# Patient Record
Sex: FEMALE | Race: WHITE | NOT HISPANIC OR LATINO | Employment: STUDENT | URBAN - METROPOLITAN AREA
[De-identification: names, ages, dates, MRNs, and addresses within clinical notes are randomized per-mention and may not be internally consistent; named-entity substitution may affect disease eponyms.]

---

## 2017-01-25 ENCOUNTER — HOSPITAL ENCOUNTER (OUTPATIENT)
Dept: CARDIOLOGY | Facility: CLINIC | Age: 27
Discharge: HOME | End: 2017-01-25
Payer: COMMERCIAL

## 2017-01-25 ENCOUNTER — OFFICE VISIT (OUTPATIENT)
Dept: CARDIOLOGY | Facility: CLINIC | Age: 27
End: 2017-01-25
Payer: COMMERCIAL

## 2017-01-25 ENCOUNTER — DOCUMENTATION ONLY (OUTPATIENT)
Dept: CARDIOLOGY | Facility: CLINIC | Age: 27
End: 2017-01-25

## 2017-01-25 VITALS
WEIGHT: 139.31 LBS | BODY MASS INDEX: 25.64 KG/M2 | DIASTOLIC BLOOD PRESSURE: 73 MMHG | OXYGEN SATURATION: 100 % | HEIGHT: 62 IN | HEART RATE: 82 BPM | SYSTOLIC BLOOD PRESSURE: 107 MMHG

## 2017-01-25 DIAGNOSIS — Q21.10 ASD (ATRIAL SEPTAL DEFECT): ICD-10-CM

## 2017-01-25 DIAGNOSIS — Q21.12 PFO (PATENT FORAMEN OVALE): ICD-10-CM

## 2017-01-25 DIAGNOSIS — Z87.74 STATUS POST DEVICE CLOSURE OF ASD: ICD-10-CM

## 2017-01-25 DIAGNOSIS — Q21.12 PFO (PATENT FORAMEN OVALE): Primary | ICD-10-CM

## 2017-01-25 DIAGNOSIS — Q21.10 ASD (ATRIAL SEPTAL DEFECT): Primary | ICD-10-CM

## 2017-01-25 LAB
DIASTOLIC DYSFUNCTION: NO
ESTIMATED PA SYSTOLIC PRESSURE: 20.79
RETIRED EF AND QEF - SEE NOTES: 60 (ref 55–65)
TRICUSPID VALVE REGURGITATION: NORMAL

## 2017-01-25 PROCEDURE — 93306 TTE W/DOPPLER COMPLETE: CPT | Mod: S$GLB,,, | Performed by: INTERNAL MEDICINE

## 2017-01-25 PROCEDURE — 99213 OFFICE O/P EST LOW 20 MIN: CPT | Mod: S$GLB,,, | Performed by: INTERNAL MEDICINE

## 2017-01-25 PROCEDURE — 99999 PR PBB SHADOW E&M-EST. PATIENT-LVL III: CPT | Mod: PBBFAC,,, | Performed by: INTERNAL MEDICINE

## 2017-01-25 PROCEDURE — 1159F MED LIST DOCD IN RCRD: CPT | Mod: S$GLB,,, | Performed by: INTERNAL MEDICINE

## 2017-01-25 NOTE — PROGRESS NOTES
Subjective:    Patient ID:  Jen Lazar is a 26 y.o. female who presents for follow-up of No chief complaint on file.      HPI    ROS     Objective:    Physical Exam      Assessment:       No diagnosis found.     Plan:

## 2017-01-25 NOTE — PROGRESS NOTES
Subjective:    Patient ID:  Jen Lazar is a 26 y.o. female who presents for follow-up of post PFO closure.  HPI She has had some sinus congestion without fever. She has no chest pain or shortness of breath.    Review of Systems   Constitution: Negative for chills, fever and malaise/fatigue.   HENT: Positive for congestion. Negative for headaches, hoarse voice and sore throat.    Cardiovascular: Negative for chest pain, claudication, cyanosis, dyspnea on exertion, irregular heartbeat, leg swelling, near-syncope, orthopnea, palpitations, paroxysmal nocturnal dyspnea and syncope.   Respiratory: Negative for cough, shortness of breath and wheezing.         Objective:    Physical Exam   Constitutional: She is oriented to person, place, and time. She appears well-developed and well-nourished. No distress.   Neck: No JVD present.   Cardiovascular: Normal rate, regular rhythm, normal heart sounds and intact distal pulses.    No murmur heard.  Pulmonary/Chest: Effort normal and breath sounds normal. No respiratory distress. She has no wheezes. She has no rales.   Musculoskeletal: She exhibits no edema.   Neurological: She is alert and oriented to person, place, and time.   Skin: Skin is warm and dry.         Assessment:       1. PFO (patent foramen ovale)    2. Status post device closure of ASD    3. ASD (atrial septal defect)         Plan:        1.    Annual follow up with echo.

## 2017-02-03 ENCOUNTER — OFFICE VISIT (OUTPATIENT)
Dept: FAMILY MEDICINE | Facility: CLINIC | Age: 27
End: 2017-02-03
Payer: COMMERCIAL

## 2017-02-03 VITALS
SYSTOLIC BLOOD PRESSURE: 100 MMHG | WEIGHT: 138.25 LBS | BODY MASS INDEX: 25.44 KG/M2 | HEIGHT: 62 IN | HEART RATE: 81 BPM | DIASTOLIC BLOOD PRESSURE: 62 MMHG

## 2017-02-03 DIAGNOSIS — F90.9 ATTENTION DEFICIT HYPERACTIVITY DISORDER (ADHD), UNSPECIFIED ADHD TYPE: Primary | ICD-10-CM

## 2017-02-03 DIAGNOSIS — S03.00XD TMJ (DISLOCATION OF TEMPOROMANDIBULAR JOINT), SUBSEQUENT ENCOUNTER: ICD-10-CM

## 2017-02-03 DIAGNOSIS — M79.7 FIBROMYALGIA: ICD-10-CM

## 2017-02-03 DIAGNOSIS — Q21.10 ASD (ATRIAL SEPTAL DEFECT): ICD-10-CM

## 2017-02-03 DIAGNOSIS — F32.A DEPRESSION, UNSPECIFIED DEPRESSION TYPE: ICD-10-CM

## 2017-02-03 PROCEDURE — 99999 PR PBB SHADOW E&M-EST. PATIENT-LVL III: CPT | Mod: PBBFAC,,, | Performed by: FAMILY MEDICINE

## 2017-02-03 PROCEDURE — 99214 OFFICE O/P EST MOD 30 MIN: CPT | Mod: S$GLB,,, | Performed by: FAMILY MEDICINE

## 2017-02-03 RX ORDER — DEXTROAMPHETAMINE SACCHARATE, AMPHETAMINE ASPARTATE, DEXTROAMPHETAMINE SULFATE AND AMPHETAMINE SULFATE 2.5; 2.5; 2.5; 2.5 MG/1; MG/1; MG/1; MG/1
1 TABLET ORAL 2 TIMES DAILY PRN
Qty: 60 TABLET | Refills: 0 | Status: SHIPPED | OUTPATIENT
Start: 2017-02-03 | End: 2017-05-03 | Stop reason: SDUPTHER

## 2017-02-03 NOTE — MR AVS SNAPSHOT
[facility]  [address]  [place] [address]  Phone: [phone]  Fax: [phone]                  Jen Lazar   2/3/2017 9:40 AM   Office Visit    Description:  Female : 1990   Provider:  Tyler Grant MD   Department:  [facility]           Reason for Visit     ADHD           Diagnoses this Visit        Comments    Attention deficit hyperactivity disorder (ADHD), unspecified ADHD type    -  Primary     ASD (atrial septal defect)         Depression, unspecified depression type         Fibromyalgia         TMJ (dislocation of temporomandibular joint), subsequent encounter                To Do List           Future Appointments        Provider Department Dept Phone    2/15/2017 11:00 AM Azam Mims MD [facility] Rheumatology [phone]    5/3/2017 9:00 AM Tyler Grant MD [facility] [phone]      Goals (5 Years of Data)     None      Follow-Up and Disposition     Return in about 3 months (around 5/3/2017).       These Medications        Disp Refills Start End    dextroamphetamine-amphetamine 10 mg Tab 60 tablet 0 2/3/2017     Take 1 tablet by mouth 2 (two) times daily as needed. - Oral    Pharmacy: [facility] [place] [address] Ph #: [phone]         [facility] On Call     [facility] On Call Nurse Care Line -  Assistance  Registered nurses in the [facility] On Call Center provide clinical advisement, health education, appointment booking, and other advisory services.  Call for this free service at [phone].             Medications           Message regarding Medications     Verify the changes and/or additions to your medication regime listed below are the same as discussed with your clinician today.  If any of these changes or additions are incorrect, please notify your healthcare provider.        CHANGE how you are taking these medications     Start Taking Instead of     "dextroamphetamine-amphetamine 10 mg Tab dextroamphetamine-amphetamine 10 mg Tab    Dosage:  Take 1 tablet by mouth 2 (two) times daily as needed. Dosage:  TAKE 1 TABLET BY MOUTH TWICE DAILY AS NEEDED    Reason for Change:  Reorder       STOP taking these medications     cetirizine (ZYRTEC) 10 MG tablet Take 1 tablet (10 mg total) by mouth once daily.           Verify that the below list of medications is an accurate representation of the medications you are currently taking.  If none reported, the list may be blank. If incorrect, please contact your healthcare provider. Carry this list with you in case of emergency.           Current Medications     aspirin (ECOTRIN) 81 MG EC tablet Take 81 mg by mouth once daily.    clopidogrel (PLAVIX) 75 mg tablet Take 1 tablet (75 mg total) by mouth once daily.    dextroamphetamine-amphetamine 10 mg Tab Take 1 tablet by mouth 2 (two) times daily as needed.    fluticasone (FLONASE) 50 mcg/actuation nasal spray 1 spray by Each Nare route 2 (two) times daily as needed for Rhinitis.           Clinical Reference Information           Your Vitals Were     BP Pulse Height Weight Last Period BMI    100/62 81 5' 2" (1.575 m) 62.7 kg (138 lb 3.7 oz) 02/02/2017 (Exact Date) 25.28 kg/m2      Blood Pressure          Most Recent Value    BP  100/62      Allergies as of 2/3/2017     Codeine    Vicodin [Hydrocodone-acetaminophen]      Immunizations Administered on Date of Encounter - 2/3/2017     None      Language Assistance Services     ATTENTION: Language assistance services are available, free of charge. Please call [phone].      ATENCIÓN: Si habla agnesañol, tiene a almaguer disposición servicios gratuitos de asistencia lingüística. Llame al [phone].     CHÚ Ý: N?u b?n nói Ti?ng Vi?t, có các d?ch v? h? tr? ngôn ng? mi?n phí dành cho b?n. G?i s? [phone].         [facility] complies with applicable Federal civil rights laws and does not discriminate on the basis " of race, color, national origin, age, disability, or sex.

## 2017-02-13 NOTE — PROGRESS NOTES
SHITORY OF PRESENT ILLNESS:  A pleasant female here today for followup of adult   ADD.  She is doing well on her medications.  She has a history of atrial septal   defect.  She is followed by Cardiology.  She has depression, fibromyalgia and   TMJ dysfunction:  We addressed each issue.  Office visit was mostly discussion,   education, and counseling.  Reviewed her medications.  She is ,   nonsmoker.  She has had breast augmentation.    PHYSICAL EXAMINATION:    GENERAL:  Pleasant female, energetic and enthusiastic.    CHEST:  Clear.    HEENT:  She has right and left TMJ dysfunction.    NECK:  No carotid bruits.    EXTREMITIES:  No clubbing, cyanosis or peripheral edema.    VITAL SIGNS:  Normal.  BMI 25.    MUSCULOSKELETAL:  No muscle tenderness or joint swelling today.    ASSESSEMENT:  Atrial septal defect, ADHD, depression, fibromyalgia and TMJ   dysfunction.    PLAN:  We addressed each issue, reviewed treatment and causes and prevention of   each of her issues.  We discussed mouthguard for her TMJ dysfunction and   followup.      RIVER/JULIANA  dd: 02/12/2017 18:25:56 (CST)  td: 02/12/2017 18:52:47 (CST)  Doc ID   #[id number]  Job ID #[id number]    CC:

## 2017-02-15 ENCOUNTER — LAB VISIT (OUTPATIENT)
Dept: LAB | Facility: HOSPITAL | Age: 27
End: 2017-02-15
Attending: INTERNAL MEDICINE
Payer: COMMERCIAL

## 2017-02-15 ENCOUNTER — OFFICE VISIT (OUTPATIENT)
Dept: RHEUMATOLOGY | Facility: CLINIC | Age: 27
End: 2017-02-15
Payer: COMMERCIAL

## 2017-02-15 VITALS
SYSTOLIC BLOOD PRESSURE: 100 MMHG | BODY MASS INDEX: 25.12 KG/M2 | HEART RATE: 78 BPM | HEIGHT: 62 IN | WEIGHT: 136.5 LBS | DIASTOLIC BLOOD PRESSURE: 68 MMHG

## 2017-02-15 DIAGNOSIS — R53.83 OTHER MALAISE AND FATIGUE: ICD-10-CM

## 2017-02-15 DIAGNOSIS — M32.9 SYSTEMIC LUPUS ERYTHEMATOSUS, UNSPECIFIED SLE TYPE, UNSPECIFIED ORGAN INVOLVEMENT STATUS: ICD-10-CM

## 2017-02-15 DIAGNOSIS — R53.81 OTHER MALAISE AND FATIGUE: ICD-10-CM

## 2017-02-15 DIAGNOSIS — M32.9 SYSTEMIC LUPUS ERYTHEMATOSUS, UNSPECIFIED SLE TYPE, UNSPECIFIED ORGAN INVOLVEMENT STATUS: Primary | ICD-10-CM

## 2017-02-15 LAB
ALBUMIN SERPL BCP-MCNC: 4.2 G/DL
ALP SERPL-CCNC: 78 U/L
ALT SERPL W/O P-5'-P-CCNC: 15 U/L
ANION GAP SERPL CALC-SCNC: 8 MMOL/L
AST SERPL-CCNC: 18 U/L
BASOPHILS # BLD AUTO: 0.03 K/UL
BASOPHILS NFR BLD: 0.4 %
BILIRUB SERPL-MCNC: 0.5 MG/DL
BUN SERPL-MCNC: 15 MG/DL
C3 SERPL-MCNC: 104 MG/DL
C4 SERPL-MCNC: 25 MG/DL
CALCIUM SERPL-MCNC: 9.8 MG/DL
CCP AB SER IA-ACNC: 1.3 U/ML
CHLORIDE SERPL-SCNC: 106 MMOL/L
CO2 SERPL-SCNC: 26 MMOL/L
CREAT SERPL-MCNC: 0.8 MG/DL
CRP SERPL-MCNC: 2.2 MG/L
DIFFERENTIAL METHOD: ABNORMAL
EOSINOPHIL # BLD AUTO: 0.2 K/UL
EOSINOPHIL NFR BLD: 2.8 %
ERYTHROCYTE [DISTWIDTH] IN BLOOD BY AUTOMATED COUNT: 12.9 %
ERYTHROCYTE [SEDIMENTATION RATE] IN BLOOD BY WESTERGREN METHOD: 14 MM/HR
EST. GFR  (AFRICAN AMERICAN): >60 ML/MIN/1.73 M^2
EST. GFR  (NON AFRICAN AMERICAN): >60 ML/MIN/1.73 M^2
GLUCOSE SERPL-MCNC: 80 MG/DL
HCT VFR BLD AUTO: 39.2 %
HGB BLD-MCNC: 12.3 G/DL
IGA SERPL-MCNC: 251 MG/DL
IGG SERPL-MCNC: 1357 MG/DL
IGM SERPL-MCNC: 132 MG/DL
LYMPHOCYTES # BLD AUTO: 1.6 K/UL
LYMPHOCYTES NFR BLD: 22.1 %
MCH RBC QN AUTO: 28.3 PG
MCHC RBC AUTO-ENTMCNC: 31.4 %
MCV RBC AUTO: 90 FL
MONOCYTES # BLD AUTO: 0.3 K/UL
MONOCYTES NFR BLD: 4.1 %
NEUTROPHILS # BLD AUTO: 5.1 K/UL
NEUTROPHILS NFR BLD: 70.5 %
PLATELET # BLD AUTO: 266 K/UL
PMV BLD AUTO: 11.3 FL
POTASSIUM SERPL-SCNC: 3.7 MMOL/L
PROT SERPL-MCNC: 8 G/DL
RBC # BLD AUTO: 4.35 M/UL
SODIUM SERPL-SCNC: 140 MMOL/L
T3FREE SERPL-MCNC: 3 PG/ML
T4 FREE SERPL-MCNC: 1.04 NG/DL
TSH SERPL DL<=0.005 MIU/L-ACNC: 0.54 UIU/ML
WBC # BLD AUTO: 7.16 K/UL

## 2017-02-15 PROCEDURE — 83516 IMMUNOASSAY NONANTIBODY: CPT

## 2017-02-15 PROCEDURE — 82784 ASSAY IGA/IGD/IGG/IGM EACH: CPT

## 2017-02-15 PROCEDURE — 82784 ASSAY IGA/IGD/IGG/IGM EACH: CPT | Mod: 59

## 2017-02-15 PROCEDURE — 83520 IMMUNOASSAY QUANT NOS NONAB: CPT

## 2017-02-15 PROCEDURE — 85025 COMPLETE CBC W/AUTO DIFF WBC: CPT

## 2017-02-15 PROCEDURE — 99215 OFFICE O/P EST HI 40 MIN: CPT | Mod: S$GLB,,, | Performed by: INTERNAL MEDICINE

## 2017-02-15 PROCEDURE — 86140 C-REACTIVE PROTEIN: CPT

## 2017-02-15 PROCEDURE — 86225 DNA ANTIBODY NATIVE: CPT

## 2017-02-15 PROCEDURE — 86038 ANTINUCLEAR ANTIBODIES: CPT

## 2017-02-15 PROCEDURE — 84481 FREE ASSAY (FT-3): CPT

## 2017-02-15 PROCEDURE — 86039 ANTINUCLEAR ANTIBODIES (ANA): CPT

## 2017-02-15 PROCEDURE — 86160 COMPLEMENT ANTIGEN: CPT | Mod: 59

## 2017-02-15 PROCEDURE — 86235 NUCLEAR ANTIGEN ANTIBODY: CPT | Mod: 59

## 2017-02-15 PROCEDURE — 86200 CCP ANTIBODY: CPT

## 2017-02-15 PROCEDURE — 84443 ASSAY THYROID STIM HORMONE: CPT

## 2017-02-15 PROCEDURE — 84439 ASSAY OF FREE THYROXINE: CPT

## 2017-02-15 PROCEDURE — 86235 NUCLEAR ANTIGEN ANTIBODY: CPT

## 2017-02-15 PROCEDURE — 99999 PR PBB SHADOW E&M-EST. PATIENT-LVL III: CPT | Mod: PBBFAC,,, | Performed by: INTERNAL MEDICINE

## 2017-02-15 PROCEDURE — 80053 COMPREHEN METABOLIC PANEL: CPT

## 2017-02-15 PROCEDURE — 86160 COMPLEMENT ANTIGEN: CPT

## 2017-02-15 PROCEDURE — 86431 RHEUMATOID FACTOR QUANT: CPT

## 2017-02-15 PROCEDURE — 82787 IGG 1 2 3 OR 4 EACH: CPT | Mod: 59

## 2017-02-15 PROCEDURE — 85651 RBC SED RATE NONAUTOMATED: CPT | Mod: PO

## 2017-02-15 PROCEDURE — 1160F RVW MEDS BY RX/DR IN RCRD: CPT | Mod: S$GLB,,, | Performed by: INTERNAL MEDICINE

## 2017-02-15 RX ORDER — PREDNISONE 5 MG/1
5 TABLET ORAL DAILY
Qty: 30 TABLET | Refills: 2 | Status: SHIPPED | OUTPATIENT
Start: 2017-02-15 | End: 2017-03-17

## 2017-02-15 RX ORDER — HYDROXYCHLOROQUINE SULFATE 200 MG/1
200 TABLET, FILM COATED ORAL 2 TIMES DAILY
Qty: 60 TABLET | Refills: 5 | Status: SHIPPED | OUTPATIENT
Start: 2017-02-15 | End: 2017-03-17

## 2017-02-15 NOTE — LETTER
March 1, 2017      Isatu Ritter, ISMAEL  [address] [place] [address]           [facility]  [address] [place] [address]  Phone: [phone]  Fax: [phone]          Patient: Jen Lazar   MR Number: [id number]   YOB: 1990   Date of Visit: 2/15/2017       Dear Isatu Ritter:    Thank you for referring Jen Lazar to me for evaluation. Attached you will find relevant portions of my assessment and plan of care.    If you have questions, please do not hesitate to call me. I look forward to following Jen Lazar along with you.    Sincerely,    Azam Mims MD    Enclosure  CC:  No Recipients    If you would like to receive this communication electronically, please contact [email or web address] or [phone] to request more information on [email or web address] Link access.    For providers and/or their staff who would like to refer a patient to [facility], please contact us through our one-stop-shop provider referral line, [facility] , at [phone].    If you feel you have received this communication in error or would no longer like to receive these types of communications, please e-mail [email or web address]

## 2017-02-15 NOTE — PROGRESS NOTES
Subjective:       Patient ID: Jen Lazar is a 26 y.o. female.    Chief Complaint: Positive BISI    HPI Comments: HPI:Lupus: Patient here for evaluation of possible SLE. Symptoms have been present for several years. Onset was gradual.  Symptoms include malar rash, photosensitivity, oral ulcers and arthritis and are of mild severity.  Symptoms are made worse by: cold exposure and movement.  Symptoms are helped by nothing.  Associated symptoms include alopecia, arthralgia, depression and oral ulcers. Patient denies associated seizures. Patient is currently taking the following medications associated with SLE type syndrome: none.    Review of Systems   Constitutional: Positive for chills and fatigue. Negative for activity change, appetite change, diaphoresis, fever and unexpected weight change.   HENT: Negative for congestion, dental problem, ear discharge, ear pain, facial swelling, mouth sores, nosebleeds, postnasal drip, rhinorrhea, sinus pressure, sneezing, sore throat, tinnitus, trouble swallowing and voice change.    Eyes: Negative for photophobia, pain, discharge, redness and itching.   Respiratory: Negative for apnea, cough, chest tightness, shortness of breath and wheezing.    Cardiovascular: Positive for leg swelling. Negative for chest pain and palpitations.   Gastrointestinal: Positive for abdominal pain. Negative for abdominal distention, constipation, diarrhea, nausea and vomiting.   Endocrine: Negative for cold intolerance, heat intolerance, polydipsia and polyuria.   Genitourinary: Negative for decreased urine volume, difficulty urinating, dysuria, flank pain, frequency, hematuria and urgency.   Musculoskeletal: Positive for arthralgias, back pain, gait problem, joint swelling, myalgias, neck pain and neck stiffness.   Skin: Negative for pallor, rash and wound.   Allergic/Immunologic: Negative for immunocompromised state.   Neurological: Positive for weakness. Negative for dizziness, tremors,  "numbness and headaches.   Hematological: Negative for adenopathy. Does not bruise/bleed easily.   Psychiatric/Behavioral: Negative for sleep disturbance. The patient is not nervous/anxious.          Objective:     Visit Vitals    /68    Pulse 78    Ht 5' 2" (1.575 m)    Wt 61.9 kg (136 lb 8 oz)    LMP 01/31/2017    BMI 24.97 kg/m2        Physical Exam   Vitals reviewed.  Constitutional: She is oriented to person, place, and time and well-developed, well-nourished, and in no distress.   HENT:   Head: Normocephalic and atraumatic.   Mouth/Throat: Oropharynx is clear and moist.   Eyes: EOM are normal. Pupils are equal, round, and reactive to light.   Neck: Neck supple. No thyromegaly present.   Cardiovascular: Normal rate, regular rhythm and normal heart sounds.  Exam reveals no gallop and no friction rub.    No murmur heard.  Pulmonary/Chest: Breath sounds normal. She has no wheezes. She has no rales. She exhibits no tenderness.   Abdominal: There is no tenderness. There is no rebound and no guarding.       Right Side Rheumatological Exam     Examination finds the elbow normal.    The patient is tender to palpation of the shoulder, wrist, knee, 1st PIP, 1st MCP, 2nd PIP, 2nd MCP, 3rd PIP, 3rd MCP, 4th PIP, 4th MCP, 5th PIP and 5th MCP    Shoulder Exam   Tenderness Location: acromion    Range of Motion   Active Abduction: abnormal   Adduction: abnormal  Effusion: negative  Sensation: normal    Knee Exam   Tenderness Location: medial joint line and LCL  Patellofemoral Crepitus: positive  Effusion: positive  Range of Motion: decreased range of motion  Sensation: normal    Hip Exam   Tenderness Location: posterior and lateral  Sensation: normal    Elbow/Wrist Exam   Tenderness Location: no tenderness  Sensation: normal    Left Side Rheumatological Exam     The patient is tender to palpation of the shoulder, elbow, wrist, knee, 1st PIP, 1st MCP, 2nd PIP, 2nd MCP, 3rd PIP, 3rd MCP, 4th PIP, 4th MCP, 5th PIP and " 5th MCP.    Shoulder Exam   Tenderness Location: acromion    Range of Motion   Active Abduction: abnormal   Sensation: normal    Knee Exam   Tenderness Location: lateral joint line and medial joint line    Patellofemoral Crepitus: positive  Effusion: positive  Range of Motion: decreased range of motion  Sensation: normal    Hip Exam   Tenderness Location: posterior and lateral  Sensation: normal    Elbow/Wrist Exam   Sensation: normal      Back/Neck Exam   Tenderness Right paramedian tenderness of the Upper C-Spine, Upper T-Spine, Upper L-Spine and SI Joint.Left paramedian tenderness of the Upper C-Spine, Upper T-Spine, SI Joint and Upper L-Spine.      Lymphadenopathy:     She has no cervical adenopathy.   Neurological: She is alert and oriented to person, place, and time. Gait normal.   Skin: Rash noted. There is erythema. There is pallor.     Psychiatric: Mood, memory, affect and judgment normal.   Musculoskeletal: She exhibits edema and tenderness. She exhibits no deformity.           Results for orders placed or performed during the hospital encounter of 01/25/17   2D Echo w/ Color Flow Doppler & Bubble Contrast   Result Value Ref Range    EF 60 55 - 65    Diastolic Dysfunction No     Est. PA Systolic Pressure 20.79     Tricuspid Valve Regurgitation TRIVIAL          Assessment:       1. Systemic lupus erythematosus, unspecified SLE type, unspecified organ involvement status    2. Other malaise and fatigue            Plan:       Jen was seen today for positive juan.    Diagnoses and all orders for this visit:    Systemic lupus erythematosus, unspecified SLE type, unspecified organ involvement status  -     JUAN; Future  -     Anti Sm/RNP Antibody; Future  -     Anti-DNA antibody, double-stranded; Future  -     Anti-Histone Antibody; Future  -     Anti-scleroderma antibody; Future  -     Anti-Smith antibody; Future  -     IgA; Future  -     IgG; Future  -     IgG 1, 2, 3, and 4; Future  -     IgM; Future  -      CBC auto differential; Future  -     Comprehensive metabolic panel; Future  -     C-reactive protein; Future  -     Cyclic citrul peptide antibody, IgG; Future  -     Rheumatoid factor; Future  -     Sedimentation rate, manual; Future  -     Sjogrens syndrome-A extractable nuclear antibody; Future  -     Sjogrens syndrome-B extractable nuclear antibody; Future  -     TSH; Future  -     T4, free; Future  -     T3, free; Future  -     Urinalysis; Future  -     C3 complement; Future  -     C4 complement; Future  -     hydroxychloroquine (PLAQUENIL) 200 mg tablet; Take 1 tablet (200 mg total) by mouth 2 (two) times daily.  -     predniSONE (DELTASONE) 5 MG tablet; Take 1 tablet (5 mg total) by mouth once daily.  -     RIBOSOMAL P ANTIBODY, NATE; Future    Other malaise and fatigue  -     BISI; Future  -     Anti Sm/RNP Antibody; Future  -     Anti-DNA antibody, double-stranded; Future  -     Anti-Histone Antibody; Future  -     Anti-scleroderma antibody; Future  -     Anti-Smith antibody; Future  -     IgA; Future  -     IgG; Future  -     IgG 1, 2, 3, and 4; Future  -     IgM; Future  -     CBC auto differential; Future  -     Comprehensive metabolic panel; Future  -     C-reactive protein; Future  -     Cyclic citrul peptide antibody, IgG; Future  -     Rheumatoid factor; Future  -     Sedimentation rate, manual; Future  -     Sjogrens syndrome-A extractable nuclear antibody; Future  -     Sjogrens syndrome-B extractable nuclear antibody; Future  -     TSH; Future  -     T4, free; Future  -     T3, free; Future  -     Urinalysis; Future  -     C3 complement; Future  -     C4 complement; Future  -     hydroxychloroquine (PLAQUENIL) 200 mg tablet; Take 1 tablet (200 mg total) by mouth 2 (two) times daily.  -     predniSONE (DELTASONE) 5 MG tablet; Take 1 tablet (5 mg total) by mouth once daily.  -     RIBOSOMAL P ANTIBODY, NATE; Future

## 2017-02-15 NOTE — MR AVS SNAPSHOT
[facility]  [address]  [facility] [address]  Phone: [phone]  Fax: [phone]                  Jen Lazar   2/15/2017 11:00 AM   Office Visit    Description:  Female : 1990   Provider:  Azam Mims MD   Department:  [facility]           Reason for Visit     Positive BISI           Diagnoses this Visit        Comments    Systemic lupus erythematosus, unspecified SLE type, unspecified organ involvement status    -  Primary     Other malaise and fatigue                To Do List           Future Appointments        Provider Department Dept Phone    5/3/2017 9:00 AM Tyler Grant MD [facility] Medicine [phone]      Goals (5 Years of Data)     None      Follow-Up and Disposition     Return in about 2 months (around 4/15/2017).       These Medications        Disp Refills Start End    hydroxychloroquine (PLAQUENIL) 200 mg tablet 60 tablet 5 2/15/2017 3/17/2017    Take 1 tablet (200 mg total) by mouth 2 (two) times daily. - Oral    Pharmacy: [facility] [address] Ph #: [phone]       predniSONE (DELTASONE) 5 MG tablet 30 tablet 2 2/15/2017 3/17/2017    Take 1 tablet (5 mg total) by mouth once daily. - Oral    [place] [facility] [place] [facility] [address] Ph #: [phone]         [facility] On Call     [facility] On Call Nurse Care Line -  Assistance  Registered nurses in the [facility] On Call Center provide clinical advisement, health education, appointment booking, and other advisory services.  Call for this free service at [phone].             Medications           Message regarding Medications     Verify the changes and/or additions to your medication regime listed below are the same as discussed with your clinician today.  If any of these changes or additions are incorrect, please notify your healthcare provider.        START taking these NEW medications        Refills     "hydroxychloroquine (PLAQUENIL) 200 mg tablet 5    Sig: Take 1 tablet (200 mg total) by mouth 2 (two) times daily.    Class: Normal    Route: Oral    predniSONE (DELTASONE) 5 MG tablet 2    Sig: Take 1 tablet (5 mg total) by mouth once daily.    Class: Normal    Route: Oral           Verify that the below list of medications is an accurate representation of the medications you are currently taking.  If none reported, the list may be blank. If incorrect, please contact your healthcare provider. Carry this list with you in case of emergency.           Current Medications     aspirin (ECOTRIN) 81 MG EC tablet Take 81 mg by mouth once daily.    clopidogrel (PLAVIX) 75 mg tablet Take 1 tablet (75 mg total) by mouth once daily.    dextroamphetamine-amphetamine 10 mg Tab Take 1 tablet by mouth 2 (two) times daily as needed.    fluticasone (FLONASE) 50 mcg/actuation nasal spray 1 spray by Each Nare route 2 (two) times daily as needed for Rhinitis.    hydroxychloroquine (PLAQUENIL) 200 mg tablet Take 1 tablet (200 mg total) by mouth 2 (two) times daily.    predniSONE (DELTASONE) 5 MG tablet Take 1 tablet (5 mg total) by mouth once daily.           Clinical Reference Information           Your Vitals Were     BP Pulse Height Weight Last Period BMI    100/68 78 5' 2" (1.575 m) 61.9 kg (136 lb 8 oz) 01/31/2017 24.97 kg/m2      Blood Pressure          Most Recent Value    BP  100/68      Allergies as of 2/15/2017     Codeine    Vicodin [Hydrocodone-acetaminophen]      Immunizations Administered on Date of Encounter - 2/15/2017     None      Orders Placed During Today's Visit     Future Labs/Procedures Expected by Expires    BISI  2/15/2017 4/16/2018    Anti Sm/RNP Antibody  2/15/2017 4/16/2018    Anti-DNA antibody, double-stranded  2/15/2017 4/16/2018    Anti-Histone Antibody  2/15/2017 4/16/2018    Anti-scleroderma antibody  2/15/2017 4/16/2018    Anti-Smith antibody  2/15/2017 4/16/2018    C-reactive protein  2/15/2017 4/16/2018 "    C3 complement  2/15/2017 4/16/2018    C4 complement  2/15/2017 4/16/2018    CBC auto differential  2/15/2017 4/16/2018    Comprehensive metabolic panel  2/15/2017 4/16/2018    Cyclic citrul peptide antibody, IgG  2/15/2017 4/16/2018    IgA  2/15/2017 4/16/2018    IgG 1, 2, 3, and 4  2/15/2017 4/16/2018    IgG  2/15/2017 4/16/2018    IgM  2/15/2017 4/16/2018    Rheumatoid factor  2/15/2017 4/16/2018    RIBOSOMAL P ANTIBODY, NATE  2/15/2017 4/16/2018    Sedimentation rate, manual  2/15/2017 4/16/2018    Sjogrens syndrome-A extractable nuclear antibody  2/15/2017 4/16/2018    Sjogrens syndrome-B extractable nuclear antibody  2/15/2017 4/16/2018    T3, free  2/15/2017 4/16/2018    T4, free  2/15/2017 4/16/2018    TSH  2/15/2017 4/16/2018    Urinalysis  2/15/2017 4/16/2018      Language Assistance Services     ATTENTION: Language assistance services are available, free of charge. Please call [phone].      ATENCIÓN: Si habla español, tiene a almaguer disposición servicios gratuitos de asistencia lingüística. Llame al [phone].     CHÚ Ý: N?u b?n nói Ti?ng Vi?t, có các d?ch v? h? tr? ngôn ng? mi?n phí dành cho b?n. G?i s? [phone].         [facility] complies with applicable Federal civil rights laws and does not discriminate on the basis of race, color, national origin, age, disability, or sex.

## 2017-02-16 LAB
ANA SER QL IF: POSITIVE
ANA TITR SER IF: NORMAL {TITER}
ANTI SM ANTIBODY: 1.59 EU
ANTI SM/RNP ANTIBODY: 1.15 EU
ANTI-SM INTERPRETATION: NEGATIVE
ANTI-SM/RNP INTERPRETATION: NEGATIVE
ANTI-SSA ANTIBODY: 0.62 EU
ANTI-SSA INTERPRETATION: NEGATIVE
ANTI-SSB ANTIBODY: 0.41 EU
ANTI-SSB INTERPRETATION: NEGATIVE
DSDNA AB SER-ACNC: NORMAL [IU]/ML
RHEUMATOID FACT SERPL-ACNC: <10 IU/ML
RIBOSOMAL P IGG SER-ACNC: <0.2 U

## 2017-02-17 LAB
IGG1 SER-MCNC: 912 MG/DL
IGG2 SER-MCNC: 367 MG/DL
IGG3 SER-MCNC: 59 MG/DL
IGG4 SER-MCNC: 32 MG/DL

## 2017-02-18 LAB — HISTONE IGG SER IA-ACNC: 0.8 UNITS (ref 0–0.9)

## 2017-02-20 LAB — ENA SCL70 AB SER-ACNC: 14 UNITS

## 2017-05-03 ENCOUNTER — OFFICE VISIT (OUTPATIENT)
Dept: FAMILY MEDICINE | Facility: CLINIC | Age: 27
End: 2017-05-03
Payer: COMMERCIAL

## 2017-05-03 VITALS
SYSTOLIC BLOOD PRESSURE: 96 MMHG | HEART RATE: 82 BPM | BODY MASS INDEX: 24.99 KG/M2 | WEIGHT: 135.81 LBS | DIASTOLIC BLOOD PRESSURE: 58 MMHG | HEIGHT: 62 IN

## 2017-05-03 DIAGNOSIS — F90.9 ATTENTION DEFICIT HYPERACTIVITY DISORDER (ADHD), UNSPECIFIED ADHD TYPE: Primary | ICD-10-CM

## 2017-05-03 DIAGNOSIS — Q21.10 ASD (ATRIAL SEPTAL DEFECT): ICD-10-CM

## 2017-05-03 DIAGNOSIS — G43.009 MIGRAINE WITHOUT AURA, WITHOUT MENTION OF INTRACTABLE MIGRAINE WITHOUT MENTION OF STATUS MIGRAINOSUS: ICD-10-CM

## 2017-05-03 PROCEDURE — 1160F RVW MEDS BY RX/DR IN RCRD: CPT | Mod: S$GLB,,, | Performed by: FAMILY MEDICINE

## 2017-05-03 PROCEDURE — 99214 OFFICE O/P EST MOD 30 MIN: CPT | Mod: S$GLB,,, | Performed by: FAMILY MEDICINE

## 2017-05-03 PROCEDURE — 99999 PR PBB SHADOW E&M-EST. PATIENT-LVL III: CPT | Mod: PBBFAC,,, | Performed by: FAMILY MEDICINE

## 2017-05-03 RX ORDER — HYDROXYCHLOROQUINE SULFATE 200 MG/1
200 TABLET, FILM COATED ORAL 2 TIMES DAILY
COMMUNITY
End: 2018-07-18 | Stop reason: SDUPTHER

## 2017-05-03 RX ORDER — DEXTROAMPHETAMINE SACCHARATE, AMPHETAMINE ASPARTATE, DEXTROAMPHETAMINE SULFATE AND AMPHETAMINE SULFATE 2.5; 2.5; 2.5; 2.5 MG/1; MG/1; MG/1; MG/1
1 TABLET ORAL 2 TIMES DAILY PRN
Qty: 60 TABLET | Refills: 0 | Status: SHIPPED | OUTPATIENT
Start: 2017-05-03 | End: 2017-06-05

## 2017-05-03 NOTE — MR AVS SNAPSHOT
[facility]  [address]  [facility] [address]  Phone: [phone]  Fax: [phone]                  Jen Lazar   5/3/2017 9:00 AM   Office Visit    Description:  Female : 1990   Provider:  Tyler Grant MD   Department:  [facility]           Reason for Visit     ADHD           Diagnoses this Visit        Comments    Attention deficit hyperactivity disorder (ADHD), unspecified ADHD type    -  Primary     ASD (atrial septal defect)                To Do List           Future Appointments        Provider Department Dept Phone    2017 2:00 PM Azam Mims MD [facility] Rheumatology [phone]    2017 10:20 AM JORDAN Gale MD [facility] [phone]      Goals (5 Years of Data)     None      Follow-Up and Disposition     Return in about 4 months (around 9/3/2017).       These Medications        Disp Refills Start End    dextroamphetamine-amphetamine 10 mg Tab 60 tablet 0 5/3/2017     Take 1 tablet by mouth 2 (two) times daily as needed. - Oral    Pharmacy: [facility] [address] Ph #: [phone]         [facility] On Call     [facility] Nurse Care [facility] -  Assistance  Unless otherwise directed by your provider, please contact [facility], our nurse care line that is available for  assistance.     Registered nurses in the [facility] provide: appointment scheduling, clinical advisement, health education, and other advisory services.  Call: [phone] (toll free)               Medications           Message regarding Medications     Verify the changes and/or additions to your medication regime listed below are the same as discussed with your clinician today.  If any of these changes or additions are incorrect, please notify your healthcare provider.        STOP taking these medications     fluticasone (FLONASE) 50 mcg/actuation nasal spray 1 spray by Each  "Nare route 2 (two) times daily as needed for Rhinitis.           Verify that the below list of medications is an accurate representation of the medications you are currently taking.  If none reported, the list may be blank. If incorrect, please contact your healthcare provider. Carry this list with you in case of emergency.           Current Medications     aspirin (ECOTRIN) 81 MG EC tablet Take 81 mg by mouth once daily.    clopidogrel (PLAVIX) 75 mg tablet Take 1 tablet (75 mg total) by mouth once daily.    dextroamphetamine-amphetamine 10 mg Tab Take 1 tablet by mouth 2 (two) times daily as needed.    hydroxychloroquine (PLAQUENIL) 200 mg tablet Take 200 mg by mouth 2 (two) times daily.           Clinical Reference Information           Your Vitals Were     BP Pulse Height Weight Last Period BMI    96/58 82 5' 2" (1.575 m) 61.6 kg (135 lb 12.9 oz) 04/17/2017 (Approximate) 24.84 kg/m2      Blood Pressure          Most Recent Value    BP  (!)  96/58      Allergies as of 5/3/2017     Codeine    Vicodin [Hydrocodone-acetaminophen]      Immunizations Administered on Date of Encounter - 5/3/2017     None      Language Assistance Services     ATTENTION: Language assistance services are available, free of charge. Please call [phone].      ATENCIÓN: Si habla adrianna, tiene a almaguer disposición servicios gratuitos de asistencia lingüística. Llame al [phone].     SARAH Ý: N?u b?n nói Ti?ng Vi?t, có các d?ch v? h? tr? ngôn ng? mi?n phí dành cho b?n. G?i s? [phone].         [facility] complies with applicable Federal civil rights laws and does not discriminate on the basis of race, color, national origin, age, disability, or sex.        "

## 2017-05-14 NOTE — PROGRESS NOTES
A pleasant female here today.  She is .  She has a history of atrial   septal defect.  She has a history of adult ADHD and was recently diagnosed with   possible lupus through Dr. Mims's office in Rheumatology.  She has chronic   musculoskeletal pain at times.  No syncope, chest pain, shortness of breath of   acute nature.  ADD is well controlled.  She is a nonsmoker, happily .    She does have periodic migraines, which are generally well controlled.    Pleasant female, 27, attractive, polite, intelligent.  No cranial nerve   abnormalities.  Gait was normal.  Upper and lower extremity reflexes are   symmetric bilaterally.  BMI 25.  No clubbing, cyanosis or peripheral edema.    ASSESSMENT:  ADHD, atrial septal defect and migraines.    We addressed each issue.  We reviewed medications.  She will continue follow up   with Rheumatology for possible lupus.  She is on Plavix.  The Adderall has   worked fine, and followup will be arranged.      RIVER/JULIANA  dd: 05/14/2017 17:07:52 (CDT)  td: 05/14/2017 22:34:03 (CDT)  Doc ID   #[id number]  Job ID #[id number]    CC:

## 2017-05-15 ENCOUNTER — TELEPHONE (OUTPATIENT)
Dept: RHEUMATOLOGY | Facility: CLINIC | Age: 27
End: 2017-05-15

## 2017-05-15 NOTE — TELEPHONE ENCOUNTER
Returned patient's call. Patient states she has sunburn on legs. It is causing increased joint pain and swelling. Treatment recommendations. Patient has lupus.

## 2017-05-17 ENCOUNTER — NURSE TRIAGE (OUTPATIENT)
Dept: ADMINISTRATIVE | Facility: CLINIC | Age: 27
End: 2017-05-17

## 2017-05-18 ENCOUNTER — TELEPHONE (OUTPATIENT)
Dept: FAMILY MEDICINE | Facility: CLINIC | Age: 27
End: 2017-05-18

## 2017-05-18 ENCOUNTER — OFFICE VISIT (OUTPATIENT)
Dept: FAMILY MEDICINE | Facility: CLINIC | Age: 27
End: 2017-05-18
Payer: COMMERCIAL

## 2017-05-18 ENCOUNTER — TELEPHONE (OUTPATIENT)
Dept: RHEUMATOLOGY | Facility: CLINIC | Age: 27
End: 2017-05-18

## 2017-05-18 VITALS
WEIGHT: 137.81 LBS | DIASTOLIC BLOOD PRESSURE: 62 MMHG | RESPIRATION RATE: 16 BRPM | HEART RATE: 92 BPM | TEMPERATURE: 99 F | BODY MASS INDEX: 25.36 KG/M2 | HEIGHT: 62 IN | OXYGEN SATURATION: 98 % | SYSTOLIC BLOOD PRESSURE: 98 MMHG

## 2017-05-18 DIAGNOSIS — M25.469 SWELLING OF KNEE JOINT, UNSPECIFIED LATERALITY: ICD-10-CM

## 2017-05-18 DIAGNOSIS — R00.2 PALPITATIONS: ICD-10-CM

## 2017-05-18 DIAGNOSIS — M32.8 OTHER FORMS OF SYSTEMIC LUPUS ERYTHEMATOSUS: Primary | ICD-10-CM

## 2017-05-18 DIAGNOSIS — L55.9 SUNBURN: ICD-10-CM

## 2017-05-18 PROCEDURE — 93005 ELECTROCARDIOGRAM TRACING: CPT | Mod: S$GLB,,, | Performed by: NURSE PRACTITIONER

## 2017-05-18 PROCEDURE — 93010 ELECTROCARDIOGRAM REPORT: CPT | Mod: ,,, | Performed by: INTERNAL MEDICINE

## 2017-05-18 PROCEDURE — 99214 OFFICE O/P EST MOD 30 MIN: CPT | Mod: S$GLB,,, | Performed by: NURSE PRACTITIONER

## 2017-05-18 PROCEDURE — 1160F RVW MEDS BY RX/DR IN RCRD: CPT | Mod: S$GLB,,, | Performed by: NURSE PRACTITIONER

## 2017-05-18 RX ORDER — HYDROCHLOROTHIAZIDE 12.5 MG/1
12.5 TABLET ORAL DAILY
Qty: 4 TABLET | Refills: 0 | Status: SHIPPED | OUTPATIENT
Start: 2017-05-18 | End: 2017-06-05

## 2017-05-18 RX ORDER — SILVER SULFADIAZINE 10 G/1000G
CREAM TOPICAL
COMMUNITY
Start: 2017-05-15 | End: 2017-05-18 | Stop reason: SDUPTHER

## 2017-05-18 RX ORDER — METHYLPREDNISOLONE 4 MG/1
TABLET ORAL
COMMUNITY
Start: 2017-05-15 | End: 2017-06-05

## 2017-05-18 RX ORDER — SILVER SULFADIAZINE 10 G/1000G
CREAM TOPICAL 2 TIMES DAILY
Qty: 85 G | Refills: 2 | Status: SHIPPED | OUTPATIENT
Start: 2017-05-18 | End: 2017-06-05

## 2017-05-18 NOTE — TELEPHONE ENCOUNTER
Reason for Disposition   Nursing judgment    Protocols used: ST NO PROTOCOL CALL: SICK ADULT-A-OH  pt reports swelling and redness to knees after getting sunburned last weekend. History of Lupus. Pt is able to walk but reports that it is difficult. Pt was seen at urgent care for this was told to walk with crutches and then to follow up with rheumologist. Pt is now running fever. Pt states that her rhuemalogist is currently out and is unable to get scheduled with another MD. Pt advised to go to ER for further eval

## 2017-05-18 NOTE — TELEPHONE ENCOUNTER
Attempted to return pt call in regards to pain. Left message for pt call office in regards to symptoms.

## 2017-05-18 NOTE — MR AVS SNAPSHOT
[facility]  [address] [address] [address]  [facility] [address]  Phone: [phone]  Fax: [phone]                  Jen Lazar   2017 3:00 PM   Office Visit    Description:  Female : 1990   Provider:  Livier Veronica NP   Department:  [facility]           Reason for Visit     Follow-up                To Do List           Future Appointments        Provider Department Dept Phone    2017 2:00 PM Azam Mims MD [facility] Rheumatology [phone]    2017 10:20 AM JORDAN Gale MD [facility] [phone]      Goals (5 Years of Data)     None      Follow-Up and Disposition     Return in about 1 week (around 2017) for if symptoms are not improving or worsen..       These Medications        Disp Refills Start End    hydrochlorothiazide (HYDRODIURIL) 12.5 MG Tab 4 tablet 0 2017    Take 1 tablet (12.5 mg total) by mouth once daily. - Oral    Pharmacy: [facility] [address] Ph #: [phone]       SSD 1 % cream 85 g 2 2017     Apply topically 2 (two) times daily. Apply to sunburn twice daily - Topical (Top)    Pharmacy: [facility] [address] Ph #: [phone]         [facility] On Call     [facility] Nurse Care Line -  Assistance  Unless otherwise directed by your provider, please contact [facility], our nurse care line that is available for  assistance.     Registered nurses in the [facility] provide: appointment scheduling, clinical advisement, health education, and other advisory services.  Call: [phone] (toll free)               Medications           Message regarding Medications     Verify the changes and/or additions to your medication regime listed below are the same as discussed with your clinician today.  If any of these changes or additions are incorrect, please notify your healthcare  "provider.        START taking these NEW medications        Refills    hydrochlorothiazide (HYDRODIURIL) 12.5 MG Tab 0    Sig: Take 1 tablet (12.5 mg total) by mouth once daily.    Class: Normal    Route: Oral    SSD 1 % cream 2    Sig: Apply topically 2 (two) times daily. Apply to sunburn twice daily    Class: Normal    Route: Topical (Top)           Verify that the below list of medications is an accurate representation of the medications you are currently taking.  If none reported, the list may be blank. If incorrect, please contact your healthcare provider. Carry this list with you in case of emergency.           Current Medications     aspirin (ECOTRIN) 81 MG EC tablet Take 81 mg by mouth once daily.    clopidogrel (PLAVIX) 75 mg tablet Take 1 tablet (75 mg total) by mouth once daily.    dextroamphetamine-amphetamine 10 mg Tab Take 1 tablet by mouth 2 (two) times daily as needed.    hydroxychloroquine (PLAQUENIL) 200 mg tablet Take 200 mg by mouth 2 (two) times daily.    hydrochlorothiazide (HYDRODIURIL) 12.5 MG Tab Take 1 tablet (12.5 mg total) by mouth once daily.    methylPREDNISolone (MEDROL DOSEPACK) 4 mg tablet     SSD 1 % cream Apply topically 2 (two) times daily. Apply to sunburn twice daily           Clinical Reference Information           Your Vitals Were     BP Pulse Temp Resp Height Weight    98/62 (BP Location: Left arm, Patient Position: Sitting, BP Method: Manual) 92 98.6 °F (37 °C) (Oral) 16 5' 2" (1.575 m) 62.5 kg (137 lb 12.6 oz)    Last Period SpO2 BMI          04/17/2017 (Approximate) 98% 25.2 kg/m2        Blood Pressure          Most Recent Value    BP  98/62      Allergies as of 5/18/2017     Codeine    Vicodin [Hydrocodone-acetaminophen]      Immunizations Administered on Date of Encounter - 5/18/2017     None      Language Assistance Services     ATTENTION: Language assistance services are available, free of charge. Please call [phone].      ATENCIÓN: bernadette Porter " disposición servicios gratuitos de asistencia lingüística. Yaya al [phone].     SRAAH Ý: N?u b?n nói Ti?ng Vi?t, có các d?ch v? h? tr? ngôn ng? mi?n phí dành cho b?n. G?i s? [phone].         [facility] complies with applicable Federal civil rights laws and does not discriminate on the basis of race, color, national origin, age, disability, or sex.

## 2017-05-18 NOTE — TELEPHONE ENCOUNTER
[facility] questioning if the SSD cream has to be brand only, it will be more expensive for the pt?  Per ISMAEL Veronica the cream could be generic.   Pharmacist read back script.

## 2017-05-18 NOTE — TELEPHONE ENCOUNTER
Returned pt call in regards to pain. Advised MD is out of office. Pt contacted triage last night due to pian redness and swelling. Was advised to go to ER. Pt stated wanted to avoid ER cost was able to get appointment in [facility] this afternoon with ISMAEL Veronica. Nurse advised to make sure appointment is kept for evaluation.

## 2017-05-18 NOTE — TELEPHONE ENCOUNTER
----- Message from Layla Costello sent at 5/18/2017 10:58 AM CDT -----  Contact: Patient  Place call to pod,Patient returning call. Please call back at [phone]. Thanks,

## 2017-05-18 NOTE — PROGRESS NOTES
"Subjective:       Patient ID: Jen Lazar is a 27 y.o. female.    Chief Complaint: Follow-up (C/o sunburn, diarrhea, swollen knees and fever. Rheumatologist not available. States she can feel heart fluttering.)    HPI onset  with sunburn on Sunday to her legs. Monday had swelling and redness to both her knees and lower legs. Went to urgent care and was given medrol dose pack. She states the swelling has gone down some but the redness is still there. Rates pain at 5-6/10 when standing up. She is on plaquenil. States that her rheumatologist is out of town so she was not able to see anyone in her office about this. She is having palpitations. EKG done here today is NSR. She had ASD closed in December. No chest pain. She states that when she keeps her legs up the swelling is much better but when she has been standing and walking around then they swell up again. The pain is better. No other concerns. She was told to follow up. See ROS.    The following portion of the patients history was reviewed and updated as appropriate: allergies, current medications, past medical and surgical history. Past social history and problem list reviewed. Family PMH and Past social history reviewed. Tobacco, Illicit drug use reviewed.     Review of Systems  Constitutional: No fatigue or fever    Skin: no rashes or lesions.  Sunburn to top of both legs with some erythema from mid thigh to mid shin area with blanching. Minimal edema.   Respiratory:   No SOB, Wheezing, cough  Cardiovascular:   No CP, Palpitations  Musculoskeletal:   No joint pain  No change in gait or coordination. .  Neurological:   No dizziness.   ,mild headaches       Objective:     BP 98/62 (BP Location: Left arm, Patient Position: Sitting, BP Method: Manual)  Pulse 92  Temp 98.6 °F (37 °C) (Oral)   Resp 16  Ht 5' 2" (1.575 m)  Wt 62.5 kg (137 lb 12.6 oz)  LMP 04/17/2017 (Approximate)  SpO2 98%  BMI 25.2 kg/m2     Physical Exam    Constitutional: oriented " to person, place, and time. well-developed and well-nourished.   Cardiovascular: Normal rate, regular rhythm and normal heart sounds.    Pulmonary/Chest: Effort normal and breath sounds normal. No respiratory distress. No wheezes.   Abdominal: Soft. Bowel sounds are normal. No distension. There is no tenderness.   Musculoskeletal: Normal range of motion. Gait and coordination normal   Neurological: oriented to person, place, and time.   Skin: Skin is warm and dry. No rashes or lesions. She has sunburn to front of legs from mid thigh down. Some erythema on top of sunburn with blanching with pressure. Minimal edema to legs and 1+ ankles. No blistering.   Psychiatric: Normal mood and affect.Behavior is normal. Judgment and thought content normal.   Assessment:       1. Other forms of systemic lupus erythematosus    2. Sunburn    3. Swelling of knee joint, unspecified laterality        Plan:         Jen was seen today for follow-up.    Diagnoses and all orders for this visit:    Other forms of systemic lupus erythematosus    Sunburn: will [facility] sil[facility]ad[facility].    Swelling of knee joint, unspecified laterality: improved. Finish steroid. Will  Give low dose diuretic for 4 days only. Hydrate well    Other orders  -     hydrochlorothiazide (HYDRODIURIL) 12.5 MG Tab; Take 1 tablet (12.5 mg total) by mouth once daily.  -     SSD 1 % cream; Apply topically 2 (two) times daily. Apply to sunburn twice daily    Continue current medication  Take medications only as prescribed  Healthy diet, exercise  Adequate rest  Adequate hydration  Avoid allergens  Avoid excessive caffeine

## 2017-05-18 NOTE — TELEPHONE ENCOUNTER
----- Message from Lucas Briceño sent at 5/18/2017 10:08 AM CDT -----  Contact: self   Patient wants to speak with a nurse regarding having pain please call back at [phone]

## 2017-06-12 ENCOUNTER — TELEPHONE (OUTPATIENT)
Dept: RHEUMATOLOGY | Facility: CLINIC | Age: 27
End: 2017-06-12

## 2017-06-12 NOTE — TELEPHONE ENCOUNTER
----- Message from Yamile Olson sent at 6/12/2017  9:53 AM CDT -----  Contact: Patient  Patient had a flair up and you were out of town. Since then she lost her baby and both the GYNO and PCP thinks she needs to be seen sooner than &11-17. Please call patient at [phone] with advice.

## 2017-06-12 NOTE — TELEPHONE ENCOUNTER
----- Message from Sylvia Fuller sent at 6/12/2017 12:06 PM CDT -----  Contact: Jen Gibbs is returning call. Please call [phone]. Thanks!

## 2017-06-12 NOTE — TELEPHONE ENCOUNTER
Returned pt call and informed that 6-19-17 at 3 pm is the soonest appointment. Pt stated was having a flare in may when Dr was out. Stated PCP and OBGYN believes the flare may be the reason for the miscarriage. Pt verbalized understanding that 6-19-17 at 3 pm is next appointment.

## 2017-06-19 ENCOUNTER — OFFICE VISIT (OUTPATIENT)
Dept: RHEUMATOLOGY | Facility: CLINIC | Age: 27
End: 2017-06-19
Payer: COMMERCIAL

## 2017-06-19 VITALS
DIASTOLIC BLOOD PRESSURE: 70 MMHG | BODY MASS INDEX: 25.35 KG/M2 | WEIGHT: 138.63 LBS | SYSTOLIC BLOOD PRESSURE: 103 MMHG | HEART RATE: 76 BPM

## 2017-06-19 DIAGNOSIS — M32.9 SYSTEMIC LUPUS ERYTHEMATOSUS, UNSPECIFIED SLE TYPE, UNSPECIFIED ORGAN INVOLVEMENT STATUS: Primary | ICD-10-CM

## 2017-06-19 DIAGNOSIS — M19.90 OSTEOARTHRITIS, UNSPECIFIED OSTEOARTHRITIS TYPE, UNSPECIFIED SITE: ICD-10-CM

## 2017-06-19 PROCEDURE — 99999 PR PBB SHADOW E&M-EST. PATIENT-LVL III: CPT | Mod: PBBFAC,,, | Performed by: INTERNAL MEDICINE

## 2017-06-19 PROCEDURE — 99214 OFFICE O/P EST MOD 30 MIN: CPT | Mod: S$GLB,,, | Performed by: INTERNAL MEDICINE

## 2017-06-19 RX ORDER — METHYLPREDNISOLONE 4 MG/1
TABLET ORAL
Qty: 1 PACKAGE | Refills: 3 | Status: SHIPPED | OUTPATIENT
Start: 2017-06-19 | End: 2017-07-10

## 2017-06-19 ASSESSMENT — ROUTINE ASSESSMENT OF PATIENT INDEX DATA (RAPID3)
PSYCHOLOGICAL DISTRESS SCORE: 3.3
PAIN SCORE: 5
PATIENT GLOBAL ASSESSMENT SCORE: 3
WHEN YOU AWAKENED IN THE MORNING OVER THE LAST WEEK, PLEASE INDICATE THE AMOUNT OF TIME IT TAKES UNTIL YOU ARE AS LIMBER AS YOU WILL BE FOR THE DAY: ONE HOUR AFTER WAKING
MDHAQ FUNCTION SCORE: 1
AM STIFFNESS SCORE: 1, YES
FATIGUE SCORE: 3
TOTAL RAPID3 SCORE: 3.78

## 2017-06-19 ASSESSMENT — SYSTEMIC LUPUS ERYTHEMATOSUS DISEASE ACTIVITY INDEX (SLEDAI): TOTAL_SCORE: 31

## 2017-06-19 NOTE — PROGRESS NOTES
Subjective:       Patient ID: Jen Lazar is a 27 y.o. female.    Chief Complaint: Follow-up    HPI:Lupus: she had a flare and afterward found out she had a miscarriage and had it induced miscarriage unsure of cause . Symptoms have been present for several years. Onset was gradual.  Symptoms include malar rash, photosensitivity, oral ulcers and arthritis and are of mild severity.  Symptoms are made worse by: cold exposure and movement.  Symptoms are helped by nothing.  Associated symptoms include alopecia, arthralgia, depression and oral ulcers. Patient denies associated seizures.       Review of Systems   Constitutional: Positive for fatigue. Negative for activity change, appetite change, chills, diaphoresis, fever and unexpected weight change.   HENT: Negative for congestion, dental problem, ear discharge, ear pain, facial swelling, mouth sores, nosebleeds, postnasal drip, rhinorrhea, sinus pressure, sneezing, sore throat, tinnitus, trouble swallowing and voice change.    Eyes: Negative for photophobia, pain, discharge, redness and itching.   Respiratory: Negative for apnea, cough, chest tightness, shortness of breath and wheezing.    Cardiovascular: Positive for leg swelling. Negative for chest pain and palpitations.   Gastrointestinal: Positive for abdominal pain. Negative for abdominal distention, constipation, diarrhea, nausea and vomiting.   Endocrine: Negative for cold intolerance, heat intolerance, polydipsia and polyuria.   Genitourinary: Negative for decreased urine volume, difficulty urinating, dysuria, flank pain, frequency, hematuria and urgency.   Musculoskeletal: Positive for arthralgias, joint swelling, myalgias, neck pain and neck stiffness. Negative for back pain and gait problem.   Skin: Negative for pallor, rash and wound.   Allergic/Immunologic: Negative for immunocompromised state.   Neurological: Positive for weakness. Negative for dizziness, tremors, numbness and headaches.    Hematological: Negative for adenopathy. Does not bruise/bleed easily.   Psychiatric/Behavioral: Negative for sleep disturbance. The patient is not nervous/anxious.          Objective:     /70 (BP Location: Left arm, Patient Position: Sitting, BP Method: Automatic)   Pulse 76   Wt 62.9 kg (138 lb 9.6 oz)   Breastfeeding? No   BMI 25.35 kg/m²      Physical Exam   Vitals reviewed.  Constitutional: She is oriented to person, place, and time and well-developed, well-nourished, and in no distress.   HENT:   Head: Normocephalic and atraumatic.   Mouth/Throat: Oropharynx is clear and moist.   Eyes: EOM are normal. Pupils are equal, round, and reactive to light.   Neck: Neck supple. No thyromegaly present.   Cardiovascular: Normal rate, regular rhythm and normal heart sounds.  Exam reveals no gallop and no friction rub.    No murmur heard.  Pulmonary/Chest: Breath sounds normal. She has no wheezes. She has no rales. She exhibits no tenderness.   Abdominal: There is no tenderness. There is no rebound and no guarding.       Right Side Rheumatological Exam     Examination finds the elbow normal.    The patient is tender to palpation of the shoulder, wrist, knee, 1st PIP, 1st MCP, 2nd PIP, 2nd MCP, 3rd PIP, 3rd MCP, 4th PIP, 4th MCP, 5th PIP and 5th MCP    Shoulder Exam   Tenderness Location: acromion    Range of Motion   Active Abduction: abnormal   Adduction: abnormal  Sensation: normal    Knee Exam   Tenderness Location: medial joint line and LCL  Patellofemoral Crepitus: positive  Effusion: positive  Sensation: normal    Hip Exam   Tenderness Location: posterior and lateral  Sensation: normal    Elbow/Wrist Exam   Tenderness Location: no tenderness  Sensation: normal    Left Side Rheumatological Exam     The patient is tender to palpation of the shoulder, elbow, wrist, knee, 1st PIP, 1st MCP, 2nd PIP, 2nd MCP, 3rd PIP, 3rd MCP, 4th PIP, 4th MCP, 5th PIP and 5th MCP.    Shoulder Exam   Tenderness Location:  acromion    Range of Motion   Active Abduction: abnormal   Sensation: normal    Knee Exam   Tenderness Location: lateral joint line and medial joint line    Patellofemoral Crepitus: positive  Effusion: positive  Sensation: normal    Hip Exam   Tenderness Location: posterior and lateral  Sensation: normal    Elbow/Wrist Exam   Sensation: normal      Back/Neck Exam   Tenderness Right paramedian tenderness of the Upper C-Spine, Upper T-Spine, Upper L-Spine and SI Joint.Left paramedian tenderness of the Upper C-Spine, Upper T-Spine, SI Joint and Upper L-Spine.      Lymphadenopathy:     She has no cervical adenopathy.   Neurological: She is alert and oriented to person, place, and time. Gait normal.   Skin: Rash noted. No erythema. No pallor.     Psychiatric: Mood, memory, affect and judgment normal.   Musculoskeletal: She exhibits edema and tenderness. She exhibits no deformity.           Results for orders placed or performed in visit on 06/07/17   hCG, quantitative   Result Value Ref Range    hCG Quant 718 mIU/mL         Assessment:       1. Systemic lupus erythematosus, unspecified SLE type, unspecified organ involvement status    2. Osteoarthritis, unspecified osteoarthritis type, unspecified site            Plan:       Jen was seen today for follow-up.    Diagnoses and all orders for this visit:    Systemic lupus erythematosus, unspecified SLE type, unspecified organ involvement status  -     Cancel: BISI; Future  -     Cancel: Anti Sm/RNP Antibody; Future  -     Cardiolipin antibody; Future  -     DRVVT; Future  -     Complement, total; Future  -     C3 complement; Future  -     C4 complement; Future  -     Beta-2 glycoprotein antibodies; Future  -     Antithrombin III; Future  -     Urinalysis; Future  -     TSH; Future  -     T4, free; Future  -     T3, free; Future  -     BISI; Future  -     Anti Sm/RNP Antibody; Future  -     Anti-DNA antibody, double-stranded; Future  -     Anti-Histone Antibody; Future  -      Anti-scleroderma antibody; Future  -     Anti-Smith antibody; Future  -     Sjogrens syndrome-A extractable nuclear antibody; Future  -     Sjogrens syndrome-B extractable nuclear antibody; Future  -     Protein S activity; Future  -     Protein C activity; Future    Osteoarthritis, unspecified osteoarthritis type, unspecified site  -     Cancel: BISI; Future  -     Cancel: Anti Sm/RNP Antibody; Future  -     Cardiolipin antibody; Future  -     DRVVT; Future  -     Complement, total; Future  -     C3 complement; Future  -     C4 complement; Future  -     Beta-2 glycoprotein antibodies; Future  -     Antithrombin III; Future  -     Urinalysis; Future  -     TSH; Future  -     T4, free; Future  -     T3, free; Future  -     BISI; Future  -     Anti Sm/RNP Antibody; Future  -     Anti-DNA antibody, double-stranded; Future  -     Anti-Histone Antibody; Future  -     Anti-scleroderma antibody; Future  -     Anti-Smith antibody; Future  -     Sjogrens syndrome-A extractable nuclear antibody; Future  -     Sjogrens syndrome-B extractable nuclear antibody; Future  -     Protein S activity; Future  -     Protein C activity; Future    Other orders  -     methylPREDNISolone (MEDROL DOSEPACK) 4 mg tablet; use as directed

## 2017-06-21 ENCOUNTER — TELEPHONE (OUTPATIENT)
Dept: CARDIOLOGY | Facility: CLINIC | Age: 27
End: 2017-06-21

## 2017-06-21 NOTE — TELEPHONE ENCOUNTER
Patient called with a question regarding how long she has to take Plavix. Dr. Crisostomo reviewed the chart and patient may stop Plavix now. Called the patient back and gave her these instructions. Verbalized understanding.

## 2017-06-22 NOTE — TELEPHONE ENCOUNTER
----- Message from Cely Wallace sent at 6/22/2017 12:36 PM CDT -----  Contact: karine garrison [facility]   Call back

## 2017-06-23 ENCOUNTER — LAB VISIT (OUTPATIENT)
Dept: LAB | Facility: HOSPITAL | Age: 27
End: 2017-06-23
Attending: INTERNAL MEDICINE
Payer: COMMERCIAL

## 2017-06-23 DIAGNOSIS — M19.90 OSTEOARTHRITIS, UNSPECIFIED OSTEOARTHRITIS TYPE, UNSPECIFIED SITE: ICD-10-CM

## 2017-06-23 DIAGNOSIS — M32.9 SYSTEMIC LUPUS ERYTHEMATOSUS, UNSPECIFIED SLE TYPE, UNSPECIFIED ORGAN INVOLVEMENT STATUS: ICD-10-CM

## 2017-06-23 LAB
BILIRUB UR QL STRIP: NEGATIVE
CLARITY UR: CLEAR
COLOR UR: YELLOW
GLUCOSE UR QL STRIP: NEGATIVE
HGB UR QL STRIP: NEGATIVE
KETONES UR QL STRIP: NEGATIVE
LEUKOCYTE ESTERASE UR QL STRIP: NEGATIVE
NITRITE UR QL STRIP: NEGATIVE
PH UR STRIP: 6 [PH] (ref 5–8)
PROT UR QL STRIP: NEGATIVE
SP GR UR STRIP: 1.02 (ref 1–1.03)
URN SPEC COLLECT METH UR: NORMAL

## 2017-06-23 PROCEDURE — 81003 URINALYSIS AUTO W/O SCOPE: CPT | Mod: PO

## 2017-06-23 RX ORDER — DEXTROAMPHETAMINE SACCHARATE, AMPHETAMINE ASPARTATE, DEXTROAMPHETAMINE SULFATE AND AMPHETAMINE SULFATE 2.5; 2.5; 2.5; 2.5 MG/1; MG/1; MG/1; MG/1
1 TABLET ORAL 2 TIMES DAILY PRN
Qty: 60 TABLET | Refills: 0 | Status: SHIPPED | OUTPATIENT
Start: 2017-06-23 | End: 2017-08-21

## 2017-08-21 ENCOUNTER — OFFICE VISIT (OUTPATIENT)
Dept: FAMILY MEDICINE | Facility: CLINIC | Age: 27
End: 2017-08-21
Payer: COMMERCIAL

## 2017-08-21 ENCOUNTER — TELEPHONE (OUTPATIENT)
Dept: RHEUMATOLOGY | Facility: CLINIC | Age: 27
End: 2017-08-21

## 2017-08-21 VITALS
HEART RATE: 90 BPM | TEMPERATURE: 98 F | WEIGHT: 137.38 LBS | HEIGHT: 62 IN | RESPIRATION RATE: 16 BRPM | OXYGEN SATURATION: 97 % | DIASTOLIC BLOOD PRESSURE: 64 MMHG | BODY MASS INDEX: 25.28 KG/M2 | SYSTOLIC BLOOD PRESSURE: 100 MMHG

## 2017-08-21 DIAGNOSIS — Q21.10 ASD (ATRIAL SEPTAL DEFECT): Primary | ICD-10-CM

## 2017-08-21 DIAGNOSIS — J02.9 SORE THROAT: Primary | ICD-10-CM

## 2017-08-21 PROCEDURE — 87081 CULTURE SCREEN ONLY: CPT

## 2017-08-21 PROCEDURE — 3008F BODY MASS INDEX DOCD: CPT | Mod: S$GLB,,, | Performed by: NURSE PRACTITIONER

## 2017-08-21 PROCEDURE — 99999 PR PBB SHADOW E&M-EST. PATIENT-LVL IV: CPT | Mod: PBBFAC,,, | Performed by: NURSE PRACTITIONER

## 2017-08-21 PROCEDURE — 99213 OFFICE O/P EST LOW 20 MIN: CPT | Mod: S$GLB,,, | Performed by: NURSE PRACTITIONER

## 2017-08-21 PROCEDURE — 87147 CULTURE TYPE IMMUNOLOGIC: CPT

## 2017-08-21 NOTE — TELEPHONE ENCOUNTER
----- Message from Luli Fuller sent at 8/21/2017 11:26 AM CDT -----  Patient is calling to let the doctor know that she is 11 weeks pregnant. Patient has lupus. Patient had a previous miscarriage and she was running fever.     Please call patient back at [phone]    Thank you

## 2017-08-21 NOTE — PATIENT INSTRUCTIONS

## 2017-08-21 NOTE — TELEPHONE ENCOUNTER
Returned patient's call. Patient called to office to state she is 11 weeks pregnant. Patient has lupus and currently taking plaquenil. Patient states she is running fever. Will discuss with Dr. Mims and return patient's call with advisement.

## 2017-08-21 NOTE — PROGRESS NOTES
"Subjective:       Patient ID: Jen Lazar is a 27 y.o. female.    Chief Complaint: Fever; Nasal Congestion; and Sore Throat  She was last seen in primary care by MAYE Hudson NP on 05/18/2017. She last saw Dr. Grant on 05/03/2017. This is her first time seeing me in the clinic.  Sore Throat    This is a new problem. The current episode started yesterday. The problem has been gradually worsening. The pain is worse on the left side. Maximum temperature: 100 last night. The pain is at a severity of 2/10. The pain is moderate. Pertinent negatives include no swollen glands. She has tried nothing for the symptoms.      Her OB is Norberto Koehler at the [facility]. She is here stating that "the back of he tongue feels like there are little cuts on it". States entire family is sick now.  She had a stated temperature of 100 last night.  Vitals:    08/21/17 1002   BP: 100/64   Pulse: 90   Resp: 16   Temp: 98.4 °F (36.9 °C)     Review of Systems   HENT: Positive for sore throat.        Objective:      Physical Exam   Constitutional: She is oriented to person, place, and time. Vital signs are normal. She appears well-developed and well-nourished. She is active and cooperative.   HENT:   Head: Normocephalic and atraumatic.   Right Ear: Hearing, tympanic membrane, external ear and ear canal normal.   Left Ear: Hearing, tympanic membrane, external ear and ear canal normal.   Nose: Nose normal.   Mouth/Throat: Uvula is midline, oropharynx is clear and moist and mucous membranes are normal.   Eyes: Lids are normal.   Neck: Normal range of motion and full passive range of motion without pain. Neck supple.   Cardiovascular: Normal rate, regular rhythm and normal heart sounds.    Pulmonary/Chest: Effort normal and breath sounds normal.   Abdominal: Soft. Bowel sounds are normal. There is no splenomegaly or hepatomegaly. There is no tenderness.   Musculoskeletal: Normal range of motion. "   Lymphadenopathy:        Head (right side): No submental, no submandibular, no tonsillar, no preauricular, no posterior auricular and no occipital adenopathy present.        Head (left side): No submental, no submandibular, no tonsillar, no preauricular, no posterior auricular and no occipital adenopathy present.     She has no cervical adenopathy.        Right cervical: No superficial cervical, no deep cervical and no posterior cervical adenopathy present.       Left cervical: No superficial cervical, no deep cervical and no posterior cervical adenopathy present.   Neurological: She is alert and oriented to person, place, and time. No cranial nerve deficit or sensory deficit. Gait normal.   Skin: Skin is warm, dry and intact.   Psychiatric: She has a normal mood and affect. Her speech is normal and behavior is normal. Judgment and thought content normal. Cognition and memory are normal.   Nursing note and vitals reviewed.      Assessment & Plan:       Sore throat  -     POCT Rapid Strep A  -     Strep A culture, throat    I have encouraged her to contact he OB to get the approved list of medications over the counter that she can safely take during her pregnancy. I have advised her to be extremely cautious relate to her high risk pregnancy status.  I have discussed her negative strept screen with her during visit.  Encouraged warm salt water gargle and to change out toothbrushes for herself and entire family.      No Follow-up on file.

## 2017-08-24 LAB
BACTERIA THROAT CULT: NORMAL
BACTERIA THROAT CULT: NORMAL

## 2017-08-25 NOTE — PROGRESS NOTES
Discussed results with patient via telephone regarding results of culture. Instructed to go  antibiotics and take all of them until gone and to notify her OB that she is taking the amoxicillin for group G strept. I have also instructed to contact our office or her OB for any other concerns. She verbalized understanding.

## 2017-08-28 ENCOUNTER — HOSPITAL ENCOUNTER (OUTPATIENT)
Dept: CARDIOLOGY | Facility: CLINIC | Age: 27
Discharge: HOME | End: 2017-08-28
Payer: COMMERCIAL

## 2017-08-28 ENCOUNTER — TELEPHONE (OUTPATIENT)
Dept: CARDIOLOGY | Facility: CLINIC | Age: 27
End: 2017-08-28

## 2017-08-28 DIAGNOSIS — Q21.10 ASD (ATRIAL SEPTAL DEFECT): ICD-10-CM

## 2017-08-28 LAB
DIASTOLIC DYSFUNCTION: NO
ESTIMATED PA SYSTOLIC PRESSURE: 22.36
RETIRED EF AND QEF - SEE NOTES: 65 (ref 55–65)
TRICUSPID VALVE REGURGITATION: NORMAL

## 2017-08-28 PROCEDURE — 93306 TTE W/DOPPLER COMPLETE: CPT | Mod: S$GLB,,, | Performed by: INTERNAL MEDICINE

## 2017-09-13 ENCOUNTER — TELEPHONE (OUTPATIENT)
Dept: RHEUMATOLOGY | Facility: CLINIC | Age: 27
End: 2017-09-13

## 2017-09-13 NOTE — TELEPHONE ENCOUNTER
Next appt 12/2017. Please advise what pt should do in regards to current flare up. Pt does see maternal fetal in regards to her pregnancy.

## 2017-09-13 NOTE — TELEPHONE ENCOUNTER
----- Message from Vanessa Melgoza sent at 9/13/2017  7:22 AM CDT -----  Contact: self  Called a few weeks ago regarding patient finding out she is pregnant.  Patient states she has Lupus.  She is having a flare up and needs to know what to do.  Please call back at [phone] (home)

## 2017-09-14 NOTE — TELEPHONE ENCOUNTER
----- Message from Jyoti Savage sent at 9/13/2017  3:05 PM CDT -----  Dr. Norberto Koehler/[facility] [phone] is calling to talk to office concerning the patient being pregnant, has lupus and is having a flare-up. Patient has called office but has received no call back. Their office said that patient has called them to see if they can do something but it is out of Dr. Koehler's scope. Please call to advise patient or to schedule at [phone].

## 2017-10-13 ENCOUNTER — TELEPHONE (OUTPATIENT)
Dept: NEUROLOGY | Facility: CLINIC | Age: 27
End: 2017-10-13

## 2017-10-13 NOTE — TELEPHONE ENCOUNTER
Returned call and spoke with patient. Appointment scheduled for 10/16. Directions given. Patient verbalized understanding.

## 2017-10-13 NOTE — TELEPHONE ENCOUNTER
----- Message from Lucas Briceño sent at 10/13/2017  8:27 AM CDT -----  Contact: Dr. Orozco office, karine Li Want to know if you received referral that was sent over if so please call back at [phone]

## 2017-10-16 ENCOUNTER — OFFICE VISIT (OUTPATIENT)
Dept: NEUROLOGY | Facility: CLINIC | Age: 27
End: 2017-10-16
Payer: COMMERCIAL

## 2017-10-16 VITALS
WEIGHT: 143.94 LBS | BODY MASS INDEX: 26.49 KG/M2 | SYSTOLIC BLOOD PRESSURE: 115 MMHG | HEIGHT: 62 IN | RESPIRATION RATE: 19 BRPM | DIASTOLIC BLOOD PRESSURE: 73 MMHG | HEART RATE: 79 BPM

## 2017-10-16 DIAGNOSIS — G43.009 MIGRAINE WITHOUT AURA AND WITHOUT STATUS MIGRAINOSUS, NOT INTRACTABLE: ICD-10-CM

## 2017-10-16 DIAGNOSIS — G43.109 MIGRAINE AURA WITHOUT HEADACHE (MIGRAINE EQUIVALENTS): Primary | ICD-10-CM

## 2017-10-16 PROCEDURE — 99999 PR PBB SHADOW E&M-EST. PATIENT-LVL IV: CPT | Mod: PBBFAC,,, | Performed by: PSYCHIATRY & NEUROLOGY

## 2017-10-16 PROCEDURE — 99204 OFFICE O/P NEW MOD 45 MIN: CPT | Mod: S$GLB,,, | Performed by: PSYCHIATRY & NEUROLOGY

## 2017-10-16 NOTE — PATIENT INSTRUCTIONS
WORKUP:  -- obtain MRI, MRA, MRV head to evaluate increased frequency of aura and migraine     PREVENTION (use daily regardless of headache):  -- start magnesium oxide 800mg daily or magnesium citrate 600mg daily (oxide can produce loose stools)  -- schedule Magnesium 2g x 1 hour infusion at [facility] x 3 days in a row, then once per week x 2 weeks, then once every 2 weeks x 2 then let me know how you do     ACUTE TREATMENT (use total no more than 10 days per month unless otherwise stated):  -- tylenol for headache as needed  -- if severe, can come into clinic for lidocaine nerve block

## 2017-10-16 NOTE — PROGRESS NOTES
Date of service: 10/16/2017  Referring provider: Dr. Norberto Koehler    Subjective:      Chief complaint: Migraine       Patient ID: Jen Lazar is a 27 y.o. lady with atrial septic defect, right bundle branch block, SLE on Plaquenil (feb 2017), history of syncope, Ibs, fibromyalgia, pineal cyst, and migraine with and without aura who is presenting for the evaluation of headaches and is a new patient to me.    She is currently 18w and 6 days pregnant.      History of Present Illness    ORIGINAL HEADACHE HISTORY - 10/16/17   Age at onset and course over time: migraines began 20 years ago but have become more frequent in the last 2 months (she is 19 weeks pregnant). Also getting many more acephalgic visual auras - happened fairly continuously now and multiple times per day; classic central, scintillating scotoma but also sometimes just tightness in right upper arm. Prior to that happened only 2-3 days per month. This did not occur with her previous pregnancies and in fact migraines improved. Auras are more than migraines.   Location: frontotemporal   Quality: pressure, throbbing  Severity: 3-6, current 0  Duration: hours to days  Frequency: every 2-3 days (1-2 days per month prior)   Alleviated by: sleep,darkness   Exacerbated by: light, noise, cough, sneezing, bending over  Associated with: Photophobia, phonophobia, blurred vision, nausea, dizziness, problems with concentration + visual aura (spots) and tightness in right arm (had it a while ago)   Sleep habits:  Caffeine intake: 1    Current acute treatment:  -- none   (ASA 81mg)    Current prevention:  -- none    Previously tried/failed acute treatment:  -- ibuprofen 800mg  -- treximet  -- maxalt   -- sumatriptan   -- tylenol  -- naproxen  -- excedrin     Previously tried/failed preventative treatment:  -- PFO closed in Jan 2017   -- lamotrigine  -- topiramate  -- metoprolol    Review of patient's allergies indicates:   Allergen Reactions    Codeine Nausea  And Vomiting    Vicodin [hydrocodone-acetaminophen] Nausea And Vomiting     Current Outpatient Prescriptions   Medication Sig Dispense Refill    aspirin (ECOTRIN) 81 MG EC tablet Take 81 mg by mouth once daily.      hydroxychloroquine (PLAQUENIL) 200 mg tablet Take 200 mg by mouth 2 (two) times daily.      PRENATAL VITS62/FA/OM3/DHA/EPA (PRENATAL GUMMY ORAL) Take 1 tablet by mouth once daily.       No current facility-administered medications for this visit.        Past Medical History  Past Medical History:   Diagnosis Date    Anemia     ASD (atrial septal defect)     repair 12/2016    Atrial septal defect     Surgery 12/2016    Bundle branch block, right     Fibromyalgia     Headache(784.0)     IBS (irritable bowel syndrome)     Kidney stone     PONV (postoperative nausea and vomiting)     Sinus tachycardia     SLE (systemic lupus erythematosus related syndrome)     Syncope and collapse        Past Surgical History  Past Surgical History:   Procedure Laterality Date    ASD REPAIR  12/2016    BREAST SURGERY      breast augmentation    GANGLION CYST EXCISION Left     KNEE ARTHROSCOPY Right        Family History  Family History   Problem Relation Age of Onset    Adopted: Yes    Anemia Mother     Fainting Mother     Hypertension Father     Arrhythmia Father     No Known Problems Daughter     No Known Problems Maternal Grandmother     No Known Problems Maternal Grandfather     No Known Problems Paternal Grandmother     No Known Problems Paternal Grandfather     No Known Problems Maternal Aunt     No Known Problems Maternal Uncle     No Known Problems Paternal Aunt     No Known Problems Paternal Uncle     Asthma Neg Hx     Clotting disorder Neg Hx     Heart attack Neg Hx     Heart disease Neg Hx     Heart failure Neg Hx     Hyperlipidemia Neg Hx     Stroke Neg Hx     Atrial Septal Defect Neg Hx        Social History  Social History     Social History    Marital status:       Spouse name: N/A    Number of children: 1    Years of education: N/A     Occupational History    Housewife      Social History Main Topics    Smoking status: Never Smoker    Smokeless tobacco: Never Used    Alcohol use No    Drug use: No    Sexual activity: Yes     Partners: Male     Other Topics Concern    Not on file     Social History Narrative    No narrative on file        Review of Systems  Constitutional: no weight loss, no change to appetite   Eyes: + change to vision, no redness, no tearing  Ears, nose, mouth, throat: no hearing loss, no tinnitus, no rhinorrhea, no difficulty swallowing   Cardiovascular: no palpitations  Respiratory: no shortness of breath, no cough   Gastrointestinal: no diarrhea, no constipation + nausea   Musculoskeletal: + muscle pain and joint pain  Skin: no rashes  Neurologic: no numbness, + weakness, change to speech, loss of coordination   Endocrine: no heat or cold intolerance   Heme: no night sweats, no easy bruising   Psych: no depression     Objective:        Vitals:    10/16/17 1341   BP: 115/73   Pulse: 79   Resp: 19     Body mass index is 26.33 kg/m².    Constitutional: appears in no acute distress, well-developed, well-nourished     Eyes: normal conjunctiva, PERRLA, optic discs are flat and sharp bilaterally     Ears, nose, mouth, throat: external appearance of ears and nose normal, hearing intact to finger rub     Cardiovascular: regular rate and rhythm, no murmurs appreciated, no carotid bruits     Respiratory: unlabored respirations, breath sounds normal bilaterally    Gastrointestinal: no abdominal masses, no tenderness, no visible hernia    Musculoskeletal: normal tone in all four extremities. No atrophy. No abnormal movements. Strength in all muscles groups of the upper and lower extremities is 5/5. Normal gait and station. Digits and nails normal.      Spine:   CERVICAL SPINE:  ROM: normal     Psychiatric: normal judgment and insight. Oriented to person,  place, and time.     Neurologic:   Cortical functions: recent and remote memory intact, normal attention span and concentration, speech fluent, adequate fund of knowledge   Cranial nerves: visual fields full, PERRLA, EOMI, facial sensation intact in V1-V3, symmetric facial strength, hearing intact to finger rub, palate elevates symmetrically, shoulder shrug 5/5, tongue protrudes midline   Reflexes: 2+ in the upper and lower extremities, no Babinski, no Stratton  Sensation: intact to light touch   Coordination: normal finger to noise    Data Review:     Results for orders placed or performed during the hospital encounter of 04/19/13   MRI Brain W WO Contrast    Narrative    Sagittal and axial images are obtained with T1 weighting.  Additional axial images are obtained with T2, flair, diffusion weighting and ADC map.  Following the administration of IV Multihance gadolinium contrast 10mls, axial and coronal images are   obtained.    The general brain anatomy appears within normal limits.  There is normal medulla, cynthia, brainstem, basal ganglia, corpus callosum, cerebral and cerebellar lobar structure.  The pituitary is not enlarged.  The internal auditory canals are sharp.  The   basal cisterns are clear and symmetric.    There is an 8mm a pineal cyst unchanged from the prior MR of 01/28/10.    There is no mass-effect or midline shift.  The ventricles are of normal size and symmetric.  The gray-white matter differentiation is normal.  Within the brain parenchyma no hyper or hypo-intensity lesions consistent with tumor, edema, CVA or hemorrhage   is seen..    Following administration of gadolinium contrast, no abnormal areas of contrast enhancement are seen.    Impression     Stable 8mm pineal cyst otherwise negative MRI of the brain with and without gadolinium      Electronically signed by: Marko Hernandez MD  Date:     04/19/13  Time:    15:50        Lab Results   Component Value Date     06/05/2017    K 3.8  06/05/2017     06/05/2017    CO2 26 06/05/2017    BUN 13 06/05/2017    CREATININE 0.59 06/05/2017    CREATININE 0.7 05/18/2013     (H) 06/05/2017    AST 21 06/05/2017    AST 18 05/11/2016    ALT 28 06/05/2017    ALBUMIN 4.9 06/05/2017    PROT 8.1 06/05/2017    BILITOT 0.6 06/05/2017    CHOL 185 03/22/2013    HDL 71 03/22/2013    LDLCALC 95.0 03/22/2013    TRIG 96 03/22/2013       Lab Results   Component Value Date    WBC 6.27 06/05/2017    HGB 12.5 06/05/2017    HCT 38.1 06/05/2017    MCV 88 06/05/2017     06/05/2017       Lab Results   Component Value Date    TSH 0.592 06/23/2017       Assessment & Plan:       Problem List Items Addressed This Visit     Migraine without aura    Relevant Orders    MRI Brain Without Contrast    MRA Brain without contrast    MRV Brain Without Contrast      Other Visit Diagnoses     Migraine aura without headache (migraine equivalents)    -  Primary    Relevant Orders    MRI Brain Without Contrast    MRA Brain without contrast    MRV Brain Without Contrast                Patient is a very nice 19 week pregnant lady who has had lifelong infrequent migraines, migraines with aura, and acephalgic migraines but during this pregnancy has seen dramatic increase in painful migraines and in particular acephalic visual and sensory auras which are occurring rather continuously. We discussed that the high estrogen environment of pregnancy is a well known trigger for auras which can even occur for first time in pregnancy but as this has never happened with her prior pregnancies, it is medically necessary to rule out other pathologies that pregnant ladies as predisposed to which can manifest as increased migraines - strokes, RCVS, and venous thrombosis especially since she has lupus and also had her PFO closed.     For treatment, there are not many options. Magnesium is ideal to help with both auras and headaches and is safe in pregnancy. Auras occurring so often there is no way  we will make this better with just oral magnesium, will need infusions at [facility]. They are affecting her life and safety as she gets them spontaneously while driving with her kids and becomes blind and can't see.     WORKUP:  -- obtain MRI, MRA, MRV head to evaluate increased frequency of aura and migraine     PREVENTION (use daily regardless of headache):  -- start magnesium oxide 800mg daily or magnesium citrate 600mg daily (oxide can produce loose stools)  -- schedule Magnesium 2g x 1 hour infusion at [facility] x 3 days in a row, then once per week x 2 weeks, then once every 2 weeks x 2 then let me know how you do     ACUTE TREATMENT (use total no more than 10 days per month unless otherwise stated):  -- tylenol for headache as needed  -- if severe, can come into clinic for lidocaine nerve block    Return in about 2 months (around 12/16/2017).    Isabel Wilder MD

## 2017-10-16 NOTE — LETTER
October 16, 2017      Norberto Koehler MD  [address] [place] [address] [address] [place] [id number]           [facility]  [address] [facility] [address]  [place] [address]  Phone: [phone]  Fax: [phone]          Patient: Jen Lazar   MR Number: [id number]   YOB: 1990   Date of Visit: 10/16/2017       Dear Dr. Norberto Koehler:    Thank you for referring Jen Lazar to me for evaluation. Attached you will find relevant portions of my assessment and plan of care.    If you have questions, please do not hesitate to call me. I look forward to following Jen Lazar along with you.    Sincerely,    Isabel Wilder MD    Enclosure  CC:  No Recipients    If you would like to receive this communication electronically, please contact [email or web address] or [phone] to request more information on [employer] access.    For providers and/or their staff who would like to refer a patient to [facility], please contact us through our one-stop-shop provider referral line, [facility], at [phone].    If you feel you have received this communication in error or would no longer like to receive these types of communications, please e-mail [email or web address]

## 2017-10-20 ENCOUNTER — TELEPHONE (OUTPATIENT)
Dept: NEUROLOGY | Facility: CLINIC | Age: 27
End: 2017-10-20

## 2017-10-20 NOTE — TELEPHONE ENCOUNTER
----- Message from Layla Costello sent at 10/20/2017  3:42 PM CDT -----  Contact: patient  Patient called with questions regarding infusion. Stated that she wanted to know did insurance approved visit?please call back to advise at [phone]. thanks

## 2017-10-20 NOTE — TELEPHONE ENCOUNTER
Returned call and spoke with patient. Informed her that the infusion is still not approved. Patient verbalized understanding.

## 2017-10-25 ENCOUNTER — TELEPHONE (OUTPATIENT)
Dept: NEUROLOGY | Facility: CLINIC | Age: 27
End: 2017-10-25

## 2017-11-02 ENCOUNTER — HOSPITAL ENCOUNTER (OUTPATIENT)
Dept: RADIOLOGY | Facility: HOSPITAL | Age: 27
Discharge: HOME | End: 2017-11-02
Attending: PSYCHIATRY & NEUROLOGY
Payer: COMMERCIAL

## 2017-11-02 DIAGNOSIS — G43.109 MIGRAINE AURA WITHOUT HEADACHE (MIGRAINE EQUIVALENTS): ICD-10-CM

## 2017-11-02 DIAGNOSIS — G43.009 MIGRAINE WITHOUT AURA AND WITHOUT STATUS MIGRAINOSUS, NOT INTRACTABLE: ICD-10-CM

## 2017-11-02 PROCEDURE — 70551 MRI BRAIN STEM W/O DYE: CPT | Mod: 26,,, | Performed by: RADIOLOGY

## 2017-11-02 PROCEDURE — 70551 MRI BRAIN STEM W/O DYE: CPT | Mod: TC,PO

## 2017-11-02 PROCEDURE — 70544 MR ANGIOGRAPHY HEAD W/O DYE: CPT | Mod: TC,PO

## 2017-11-07 ENCOUNTER — PATIENT MESSAGE (OUTPATIENT)
Dept: NEUROLOGY | Facility: CLINIC | Age: 27
End: 2017-11-07

## 2017-11-08 ENCOUNTER — TELEPHONE (OUTPATIENT)
Dept: NEUROLOGY | Facility: CLINIC | Age: 27
End: 2017-11-08

## 2017-11-08 NOTE — TELEPHONE ENCOUNTER
----- Message from Isabel Wilder MD sent at 11/7/2017  5:53 PM CST -----  Please change patient's [facility] infusions to be:    Banana bag over 2 hours + magnesium 2g over 1 hour    3 times per week x 1 week  2 times per week x 2 weeks  1 time per week standing

## 2017-11-28 ENCOUNTER — TELEPHONE (OUTPATIENT)
Dept: NEUROLOGY | Facility: CLINIC | Age: 27
End: 2017-11-28

## 2017-11-28 NOTE — TELEPHONE ENCOUNTER
Message sent to Martha Fuller at preservice to have referral extended; will check back later to make sure referral was extended.

## 2017-11-28 NOTE — TELEPHONE ENCOUNTER
----- Message from Dottie Young sent at 11/28/2017  1:30 PM CST -----  Jenny, from [facility] states patients auth needs to be extended, please call [phone]

## 2017-11-29 ENCOUNTER — TELEPHONE (OUTPATIENT)
Dept: NEUROLOGY | Facility: CLINIC | Age: 27
End: 2017-11-29

## 2017-11-29 NOTE — TELEPHONE ENCOUNTER
----- Message from Jyoti Savage sent at 11/29/2017 10:08 AM CST -----  [facility] [phone] is calling for an extension or another authorization for patient's infusion treatment on 11/30/17. She is coming in tomorrow so [facility] needs this now.

## 2017-11-29 NOTE — TELEPHONE ENCOUNTER
Returned call and spoke with Alicia. Alicia said that the patient has an infusion tomorrow and they need an extension. Informed Alicia our referral states it is good until 12/08/2017. Referral faxed to Alicia at [facility].

## 2017-12-13 NOTE — PROGRESS NOTES
Chief Complaint   Patient presents with   • Establish Care     New Pt. Establish care. Pt feeling well, no concerns. c/o right knee pain, but states he follows with orthopedics.        HPI  She is here today to establish care. He states any Blood pressure medicine refilled. He had a Medicare wellness exam done last month.    Current Outpatient Prescriptions   Medication Sig Dispense Refill   • metoPROLOL (LOPRESSOR) 25 MG tablet Take 1 tablet by mouth 2 times daily. Further refill will require Appt. 180 tablet 0   • acetaminophen (ARTHRITIS PAIN RELIEVER) 650 MG CR tablet Take 650 mg by mouth every 8 hours as needed for Pain.     • ibuprofen (MOTRIN) 200 MG tablet Take 100 mg by mouth every 6 hours as needed for Pain.       No current facility-administered medications for this visit.        ROS:  Patient seems to be troubled by chronic pain in his right knee. His been bothering for over a year. His been taking high-dose corticosteroid injections every 3 months. He did not think they helped very much.    Patient is very active. 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 Because of his knee pain he recommended that he be taken off the schedule for winter. He has no other bone or joint problems.  I have reviewed patient's allergies and medications and made adjustments and updates as needed.  See EMR display.    Remainder of review of systems is negative.  PHYSICAL EXAM:  Visit Vitals  /84 (BP Location: [facility], Patient Position: Sitting, Cuff Size: Regular)   Pulse 68   Temp 97.9 °F (36.6 °C) (Tympanic)   Resp 18   Ht 5' 10\" (1.778 m)   Wt 69.9 kg   SpO2 97%   BMI 22.10 kg/m²    Body mass index is 22.1 kg/m².   Patient appears in no acute distress  HEENT: PERRLA, EOMI. Sclera and conjunctiva are clear. External ears canals and TMs are normal.    Throat is clear. Oral mucosa is well-hydrated. Dentition is good  LYMPH NODES: Cervical and supraclavicular lymph nodes are normal  Neck veins are not distended. Carotid  Patient identified by 2 identifiers.   Allergies reviewed.  22 g IV placed to Rt hand.  Bubble study explained to patient, patient verbalized understanding.  Bubble performed X 2, with & without Valsalva.  Pt tolerated procedure well.  IV d/c with catheter intact, pressure dsg applied.   upstrokes are brisk and equal without bruits  LUNGS: Clear to auscultation  HEART: Regular rhythm. No murmur gallop or rub.  EXTREMITIES: No edema or cyanosis.  Right knee is stable to AP rotatory and lateral stresses. No swelling erythema or warmth.    Fasting lab work is ordered.    X-rays of his right knee were reviewed. He has some patellofemoral osteophytes but otherwise the joint space is very well preserved.    40 minutes was spent evaluating patient initially.    IMPRESSION:  #1 hypertension controlled  #2 chronic right knee pain seems to be out of proportion for his radiographic appearance    Plan:  Patient will continue metoprolol for his hypertension. He would like to see orthopedics for consultation. I recommend that we obtain an MRI of the knee prior to referral.

## 2017-12-15 ENCOUNTER — TELEPHONE (OUTPATIENT)
Dept: PEDIATRIC CARDIOLOGY | Facility: CLINIC | Age: 27
End: 2017-12-15

## 2017-12-15 ENCOUNTER — OFFICE VISIT (OUTPATIENT)
Dept: RHEUMATOLOGY | Facility: CLINIC | Age: 27
End: 2017-12-15
Payer: COMMERCIAL

## 2017-12-15 VITALS
WEIGHT: 160.5 LBS | SYSTOLIC BLOOD PRESSURE: 112 MMHG | HEART RATE: 85 BPM | BODY MASS INDEX: 29.35 KG/M2 | DIASTOLIC BLOOD PRESSURE: 69 MMHG

## 2017-12-15 DIAGNOSIS — Z34.90 GRAVIDA 3, CURRENTLY PREGNANT: ICD-10-CM

## 2017-12-15 DIAGNOSIS — O35.9XX0 SUSPECTED FETAL ABNORMALITY AFFECTING MANAGEMENT OF MOTHER, ANTEPARTUM, SINGLE OR UNSPECIFIED FETUS: Primary | ICD-10-CM

## 2017-12-15 DIAGNOSIS — M32.9 SYSTEMIC LUPUS ERYTHEMATOSUS, UNSPECIFIED SLE TYPE, UNSPECIFIED ORGAN INVOLVEMENT STATUS: Primary | ICD-10-CM

## 2017-12-15 PROCEDURE — 99999 PR PBB SHADOW E&M-EST. PATIENT-LVL III: CPT | Mod: PBBFAC,,, | Performed by: INTERNAL MEDICINE

## 2017-12-15 PROCEDURE — 99214 OFFICE O/P EST MOD 30 MIN: CPT | Mod: S$GLB,,, | Performed by: INTERNAL MEDICINE

## 2017-12-15 RX ORDER — PREDNISONE 5 MG/1
5 TABLET ORAL DAILY PRN
Qty: 30 TABLET | Refills: 3 | Status: SHIPPED | OUTPATIENT
Start: 2017-12-15 | End: 2018-01-14

## 2017-12-15 ASSESSMENT — ROUTINE ASSESSMENT OF PATIENT INDEX DATA (RAPID3)
TOTAL RAPID3 SCORE: 1.78
MDHAQ FUNCTION SCORE: 1
PSYCHOLOGICAL DISTRESS SCORE: 1.1
FATIGUE SCORE: 2
PATIENT GLOBAL ASSESSMENT SCORE: 2
PAIN SCORE: 0
AM STIFFNESS SCORE: 0, NO

## 2017-12-15 NOTE — TELEPHONE ENCOUNTER
Spoke with patient via telephone. Fetal echo schedule for 12/22/17 @ 9 am. Office number and address given.     ----- Message from Croine Wray RN sent at 12/14/2017 11:58 AM CST -----  Contact: Meghan:  Dr. Lopez's Office             [phone]      ----- Message -----  From: Al Brown  Sent: 12/14/2017   9:50 AM  To: Corine Wray RN, Tali Thompson A Staff    Caller states that patient needs a fetal echo with Dr. Cesar. Dx is a Hx of ASD. Patient is currently 27w & 2d. Please advise.

## 2017-12-15 NOTE — PROGRESS NOTES
Subjective:       Patient ID: Jen Lazar is a 27 y.o. female.    Chief Complaint: No chief complaint on file.    HPI:Lupus second trimester, gravid 3 pregnancy doing well slightly swollen mcp,pip, wrist, and malar rash in the PM Symptoms include malar rash, photosensitivity, oral ulcers and arthritis and are of mild severity.  Symptoms are made worse by: cold exposure and movement.  Symptoms are helped by nothing.  Associated symptoms include alopecia, arthralgia, depression and oral ulcers. Patient denies associated seizures.       Review of Systems   Constitutional: Positive for fatigue. Negative for activity change, appetite change, chills, diaphoresis, fever and unexpected weight change.   HENT: Negative for congestion, dental problem, ear discharge, ear pain, facial swelling, mouth sores, nosebleeds, postnasal drip, rhinorrhea, sinus pressure, sneezing, sore throat, tinnitus, trouble swallowing and voice change.    Eyes: Negative for photophobia, pain, discharge, redness and itching.   Respiratory: Negative for apnea, cough, chest tightness, shortness of breath and wheezing.    Cardiovascular: Positive for leg swelling. Negative for chest pain and palpitations.   Gastrointestinal: Positive for abdominal pain. Negative for abdominal distention, constipation, diarrhea, nausea and vomiting.   Endocrine: Negative for cold intolerance, heat intolerance, polydipsia and polyuria.   Genitourinary: Negative for decreased urine volume, difficulty urinating, dysuria, flank pain, frequency, hematuria and urgency.   Musculoskeletal: Positive for arthralgias, joint swelling, myalgias, neck pain and neck stiffness. Negative for back pain and gait problem.   Skin: Negative for pallor, rash and wound.   Allergic/Immunologic: Negative for immunocompromised state.   Neurological: Positive for weakness. Negative for dizziness, tremors, numbness and headaches.   Hematological: Negative for adenopathy. Does not bruise/bleed  easily.   Psychiatric/Behavioral: Negative for sleep disturbance. The patient is not nervous/anxious.          Objective:     LMP 04/17/2017 (Approximate)      Physical Exam   Vitals reviewed.  Constitutional: She is oriented to person, place, and time and well-developed, well-nourished, and in no distress.   HENT:   Head: Normocephalic and atraumatic.   Mouth/Throat: Oropharynx is clear and moist.   Eyes: EOM are normal. Pupils are equal, round, and reactive to light.   Neck: Neck supple. No thyromegaly present.   Cardiovascular: Normal rate, regular rhythm and normal heart sounds.  Exam reveals no gallop and no friction rub.    No murmur heard.  Pulmonary/Chest: Breath sounds normal. She has no wheezes. She has no rales. She exhibits no tenderness.   Abdominal: There is no tenderness. There is no rebound and no guarding.       Right Side Rheumatological Exam     Examination finds the elbow normal.    The patient is tender to palpation of the shoulder, wrist, knee, 1st PIP, 1st MCP, 2nd PIP, 2nd MCP, 3rd PIP, 3rd MCP, 4th PIP, 4th MCP, 5th PIP and 5th MCP    Shoulder Exam   Tenderness Location: acromion    Range of Motion   Active Abduction: abnormal   Adduction: abnormal  Sensation: normal    Knee Exam   Tenderness Location: medial joint line and LCL  Patellofemoral Crepitus: positive  Effusion: positive  Sensation: normal    Hip Exam   Tenderness Location: posterior and lateral  Sensation: normal    Elbow/Wrist Exam   Tenderness Location: no tenderness  Sensation: normal    Left Side Rheumatological Exam     The patient is tender to palpation of the shoulder, elbow, wrist, knee, 1st PIP, 1st MCP, 2nd PIP, 2nd MCP, 3rd PIP, 3rd MCP, 4th PIP, 4th MCP, 5th PIP and 5th MCP.    Shoulder Exam   Tenderness Location: acromion    Range of Motion   Active Abduction: abnormal   Sensation: normal    Knee Exam   Tenderness Location: lateral joint line and medial joint line    Patellofemoral Crepitus: positive  Effusion:  positive  Sensation: normal    Hip Exam   Tenderness Location: posterior and lateral  Sensation: normal    Elbow/Wrist Exam   Sensation: normal      Back/Neck Exam   Tenderness Right paramedian tenderness of the Upper C-Spine, Upper T-Spine, Upper L-Spine and SI Joint.Left paramedian tenderness of the Upper C-Spine, Upper T-Spine, SI Joint and Upper L-Spine.      Lymphadenopathy:     She has no cervical adenopathy.   Neurological: She is alert and oriented to person, place, and time. Gait normal.   Skin: Rash noted. No erythema. No pallor.     Psychiatric: Mood, memory, affect and judgment normal.   Musculoskeletal: She exhibits edema and tenderness. She exhibits no deformity.           Results for orders placed or performed during the hospital encounter of 17   2D Echo w/ Color Flow Doppler   Result Value Ref Range    EF 65 55 - 65    Diastolic Dysfunction No     Est. PA Systolic Pressure 22.36     Tricuspid Valve Regurgitation TRIVIAL          Assessment:       1. Systemic lupus erythematosus, unspecified SLE type, unspecified organ involvement status    2.  3, currently pregnant            Plan:       Jen was seen today for follow-up.    Diagnoses and all orders for this visit:    Systemic lupus erythematosus, unspecified SLE type, unspecified organ involvement status  Comments:  prn low dose prednisone  Orders:  -     BISI; Future  -     Anti Sm/RNP Antibody; Future  -     Anti-DNA antibody, double-stranded; Future  -     Anti-Histone Antibody; Future  -     Anti-scleroderma antibody; Future  -     Anti-Smith antibody; Future  -     Sjogrens syndrome-A extractable nuclear antibody; Future  -     Sjogrens syndrome-B extractable nuclear antibody; Future  -     Sedimentation rate, manual; Future  -     Comprehensive metabolic panel; Future  -     C-reactive protein; Future  -     Urinalysis; Future  -     C3 complement; Future  -     C4 complement; Future  -     predniSONE (DELTASONE) 5 MG tablet;  Take 1 tablet (5 mg total) by mouth daily as needed.     3, currently pregnant  -     BISI; Future  -     Anti Sm/RNP Antibody; Future  -     Anti-DNA antibody, double-stranded; Future  -     Anti-Histone Antibody; Future  -     Anti-scleroderma antibody; Future  -     Anti-Smith antibody; Future  -     Sjogrens syndrome-A extractable nuclear antibody; Future  -     Sjogrens syndrome-B extractable nuclear antibody; Future  -     Sedimentation rate, manual; Future  -     Comprehensive metabolic panel; Future  -     C-reactive protein; Future  -     Urinalysis; Future  -     C3 complement; Future  -     C4 complement; Future  -     predniSONE (DELTASONE) 5 MG tablet; Take 1 tablet (5 mg total) by mouth daily as needed.    doing well, no change in tx prn prednisone

## 2017-12-19 ENCOUNTER — TELEPHONE (OUTPATIENT)
Dept: NEUROLOGY | Facility: CLINIC | Age: 27
End: 2017-12-19

## 2017-12-19 NOTE — TELEPHONE ENCOUNTER
Returned call and spoke with patient. Appointment rescheduled for 12/21 at 11 am. Patient verbalized understanding.

## 2017-12-19 NOTE — TELEPHONE ENCOUNTER
----- Message from Jyoti aSvage sent at 12/19/2017 10:49 AM CST -----  Patient is calling to reschedule her appointment scheduled for tomorrow. Please call back at [phone].

## 2017-12-21 ENCOUNTER — OFFICE VISIT (OUTPATIENT)
Dept: NEUROLOGY | Facility: CLINIC | Age: 27
End: 2017-12-21
Payer: COMMERCIAL

## 2017-12-21 VITALS
WEIGHT: 153.19 LBS | DIASTOLIC BLOOD PRESSURE: 61 MMHG | HEART RATE: 91 BPM | BODY MASS INDEX: 28.19 KG/M2 | HEIGHT: 62 IN | RESPIRATION RATE: 18 BRPM | SYSTOLIC BLOOD PRESSURE: 99 MMHG

## 2017-12-21 DIAGNOSIS — G43.109 MIGRAINE WITH AURA AND WITHOUT STATUS MIGRAINOSUS, NOT INTRACTABLE: ICD-10-CM

## 2017-12-21 PROCEDURE — 99999 PR PBB SHADOW E&M-EST. PATIENT-LVL III: CPT | Mod: PBBFAC,,, | Performed by: PSYCHIATRY & NEUROLOGY

## 2017-12-21 PROCEDURE — 99214 OFFICE O/P EST MOD 30 MIN: CPT | Mod: S$GLB,,, | Performed by: PSYCHIATRY & NEUROLOGY

## 2017-12-21 RX ORDER — ONDANSETRON 4 MG/1
8 TABLET, FILM COATED ORAL 2 TIMES DAILY
Status: ON HOLD | COMMUNITY
End: 2018-03-06 | Stop reason: CLARIF

## 2017-12-21 NOTE — PATIENT INSTRUCTIONS
WORKUP:  -- none     PREVENTION (use daily regardless of headache):  -- continue oral magnesium daily   -- continue with planned magnesium / banana bag infusion on 12/28 and once more after than - going forward, pay attention to if auras return and at what time point they return     ACUTE TREATMENT (use total no more than 10 days per month unless otherwise stated):  -- tylenol for headache as needed  -- if severe, can come into clinic for lidocaine nerve block

## 2017-12-21 NOTE — PROGRESS NOTES
Date of service: 12/21/2017  Referring provider: No ref. provider found    Subjective:      Chief complaint: Follow-up (Migraines. Pt states she is doing better especially with infusions.)       Patient ID: Jen Lazar is a 27 y.o. lady with atrial septic defect, right bundle branch block, SLE on Plaquenil (feb 2017), history of syncope, Ibs, fibromyalgia, pineal cyst, and migraine with and without aura who is presenting for the evaluation of headaches and is a new patient to me.    She is currently 28w and 2 days pregnant.      History of Present Illness    INTERVAL HISTORY - 12/21/17    Last seen as a new consult for constant visual auras in the setting of pregnancy, 2 months ago. I referred for magnesium infusions (Magnesium 2g x 1 hour infusion at [facility] x 3 days in a row, then once per week x 2 weeks, then once every 2 weeks x 2). She could not tolerate the wean to once per week (auras returned right away) so on 11/8/17 I increased the infusions to:     Banana bag over 2 hours + magnesium 2g over 1 hour  3 times per week x 1 week  2 times per week x 2 weeks  1 time per week standing     This month she had infusion on 12/6; 12/12; 12/15. Her next one 12/28.      Today she reports she is much better. She has not had any further auras since I increased her magnesium infusions. She is not really having headaches other than yesterday when she was sick from stomach virus. Her current head pain is 0 with a range of 1 to 5. She is taking magnesium citrate orally as well.     ORIGINAL HEADACHE HISTORY - 10/16/17   Age at onset and course over time: migraines began 20 years ago but have become more frequent in the last 2 months (she is 19 weeks pregnant). Also getting many more acephalgic visual auras - happened fairly continuously now and multiple times per day; classic central, scintillating scotoma but also sometimes just tightness in right upper arm. Prior to that happened only 2-3 days per  month. This did not occur with her previous pregnancies and in fact migraines improved. Auras are more than migraines.   Location: frontotemporal   Quality: pressure, throbbing  Severity: 3-6, current 0  Duration: hours to days  Frequency: every 2-3 days (1-2 days per month prior)   Alleviated by: sleep,darkness   Exacerbated by: light, noise, cough, sneezing, bending over  Associated with: Photophobia, phonophobia, blurred vision, nausea, dizziness, problems with concentration + visual aura (spots) and tightness in right arm (had it a while ago)   Sleep habits:  Caffeine intake: 1    Current acute treatment:  -- tylenol - not used   (ASA 81mg)    Current prevention:  -- magnesium citrate daily     Previously tried/failed acute treatment:  -- ibuprofen 800mg  -- treximet  -- maxalt   -- sumatriptan   -- tylenol  -- naproxen  -- excedrin     Previously tried/failed preventative treatment:  -- PFO closed in Jan 2017   -- lamotrigine  -- topiramate  -- metoprolol    Review of patient's allergies indicates:   Allergen Reactions    Codeine Nausea And Vomiting    Vicodin [hydrocodone-acetaminophen] Nausea And Vomiting     Current Outpatient Prescriptions   Medication Sig Dispense Refill    aspirin (ECOTRIN) 81 MG EC tablet Take 81 mg by mouth once daily.      hydroxychloroquine (PLAQUENIL) 200 mg tablet Take 200 mg by mouth 2 (two) times daily.      ondansetron (ZOFRAN) 4 MG tablet Take 8 mg by mouth 2 (two) times daily.      predniSONE (DELTASONE) 5 MG tablet Take 1 tablet (5 mg total) by mouth daily as needed. 30 tablet 3    PRENATAL VITS62/FA/OM3/DHA/EPA (PRENATAL GUMMY ORAL) Take 1 tablet by mouth once daily.       No current facility-administered medications for this visit.        Past Medical History  Past Medical History:   Diagnosis Date    Anemia     ASD (atrial septal defect)     repair 12/2016    Atrial septal defect     Surgery 12/2016    Bundle branch block, right     Fibromyalgia      Headache(784.0)     IBS (irritable bowel syndrome)     Kidney stone     PONV (postoperative nausea and vomiting)     Sinus tachycardia     SLE (systemic lupus erythematosus related syndrome)     Syncope and collapse        Past Surgical History  Past Surgical History:   Procedure Laterality Date    ASD REPAIR  12/2016    BREAST SURGERY      breast augmentation    GANGLION CYST EXCISION Left     KNEE ARTHROSCOPY Right        Family History  Family History   Problem Relation Age of Onset    Adopted: Yes    Anemia Mother     Fainting Mother     Hypertension Father     Arrhythmia Father     No Known Problems Daughter     No Known Problems Maternal Grandmother     No Known Problems Maternal Grandfather     No Known Problems Paternal Grandmother     No Known Problems Paternal Grandfather     No Known Problems Maternal Aunt     No Known Problems Maternal Uncle     No Known Problems Paternal Aunt     No Known Problems Paternal Uncle     Asthma Neg Hx     Clotting disorder Neg Hx     Heart attack Neg Hx     Heart disease Neg Hx     Heart failure Neg Hx     Hyperlipidemia Neg Hx     Stroke Neg Hx     Atrial Septal Defect Neg Hx        Social History  Social History     Social History    Marital status:      Spouse name: N/A    Number of children: 1    Years of education: N/A     Occupational History    Housewife      Social History Main Topics    Smoking status: Never Smoker    Smokeless tobacco: Never Used    Alcohol use No    Drug use: No    Sexual activity: Yes     Partners: Male     Other Topics Concern    Not on file     Social History Narrative    No narrative on file        Review of Systems  Constitutional: no weight loss, no change to appetite   Eyes: + change to vision, no redness, no tearing  Ears, nose, mouth, throat: no hearing loss, no tinnitus, no rhinorrhea, no difficulty swallowing   Cardiovascular: no palpitations  Respiratory: no shortness of breath, no  cough   Gastrointestinal: no diarrhea, no constipation + nausea   Musculoskeletal: no muscle pain and joint pain  Skin: no rashes  Neurologic: no numbness, no weakness, change to speech, loss of coordination   Endocrine: no heat or cold intolerance   Heme: no night sweats, no easy bruising   Psych: no depression     Objective:        Vitals:    12/21/17 1120   BP: 99/61   Pulse: 91   Resp: 18     Body mass index is 28.02 kg/m².    General: Well developed, well nourished.  No acute distress.  HEENT: Atraumatic, normocephalic.  Neck: Trachea midline  Cardiovascular: Vitals reviewed. Normal peripheral perfusion.   Pulmonary: No increased work of breathing.  Abdomen/GI:  Pregnant   Musculoskeletal: No obvious joint deformities, moves all extremities symmetrically and well.     Neurological exam:  Mental status: Awake and alert. Oriented to situation.  Speech fluent and appropriate. Recent and remote memory appear to be intact.  Fund of knowledge normal.  Cranial nerves: Pupils equal round, extraocular movements intact, facial strength intact bilaterally, hearing grossly intact bilaterally.  Gait: Normal     Data Review:     11/2/17 MRI brain:  I personally reviewed this study which I interpreted to show stable pineal cyst     11/2/17 MRA brain:   I personally reviewed this study which I interpreted to be normal    11/2/17 MRV head  I personally reviewed this study which I interpreted to be normal     Results for orders placed or performed during the hospital encounter of 04/19/13   MRI Brain W WO Contrast    Narrative    Sagittal and axial images are obtained with T1 weighting.  Additional axial images are obtained with T2, flair, diffusion weighting and ADC map.  Following the administration of IV Multihance gadolinium contrast 10mls, axial and coronal images are   obtained.    The general brain anatomy appears within normal limits.  There is normal medulla, cynthia, brainstem, basal ganglia, corpus callosum, cerebral and  cerebellar lobar structure.  The pituitary is not enlarged.  The internal auditory canals are sharp.  The   basal cisterns are clear and symmetric.    There is an 8mm a pineal cyst unchanged from the prior MR of 01/28/10.    There is no mass-effect or midline shift.  The ventricles are of normal size and symmetric.  The gray-white matter differentiation is normal.  Within the brain parenchyma no hyper or hypo-intensity lesions consistent with tumor, edema, CVA or hemorrhage   is seen..    Following administration of gadolinium contrast, no abnormal areas of contrast enhancement are seen.    Impression     Stable 8mm pineal cyst otherwise negative MRI of the brain with and without gadolinium      Electronically signed by: Marko Hernandez MD  Date:     04/19/13  Time:    15:50        Lab Results   Component Value Date     06/05/2017    K 3.8 06/05/2017     06/05/2017    CO2 26 06/05/2017    BUN 13 06/05/2017    CREATININE 0.59 06/05/2017    CREATININE 0.7 05/18/2013     (H) 06/05/2017    AST 21 06/05/2017    AST 18 05/11/2016    ALT 28 06/05/2017    ALBUMIN 4.9 06/05/2017    PROT 8.1 06/05/2017    BILITOT 0.6 06/05/2017    CHOL 185 03/22/2013    HDL 71 03/22/2013    LDLCALC 95.0 03/22/2013    TRIG 96 03/22/2013       Lab Results   Component Value Date    WBC 6.27 06/05/2017    HGB 12.5 06/05/2017    HCT 38.1 06/05/2017    MCV 88 06/05/2017     06/05/2017       Lab Results   Component Value Date    TSH 0.592 06/23/2017       Assessment & Plan:       Problem List Items Addressed This Visit        Neuro    Migraine with aura    Overview     Lifelong infrequent migraines, migraines with aura, and acephalgic migraines but during this pregnancy has seen dramatic increase in painful migraines and in particular acephalic visual and sensory auras which are occurring rather continuously. We discussed that the high estrogen environment of pregnancy is a well known trigger for auras which can even occur  for first time in pregnancy but as this has never happened with her prior pregnancies. Pathologies that pregnant ladies as predisposed to which can manifest as increased migraines - strokes, RCVS, and venous thrombosis especially since she has lupus and also had her PFO closed - have all been ruled out with brain MRI, MRA, MRV.    Her auras have resolved with serial magnesium infusions. We will try tapering them down to once per week x 2 and monitor for return. At the point they start returning will need to return to infusions.                    WORKUP:  -- none     PREVENTION (use daily regardless of headache):  -- continue oral magnesium daily   -- continue with planned magnesium / banana bag infusion on 12/28 and once more after than - going forward, pay attention to if auras return and at what time point they return     ACUTE TREATMENT (use total no more than 10 days per month unless otherwise stated):  -- tylenol for headache as needed  -- if severe, can come into clinic for lidocaine nerve block    Return in about 2 months (around 2/21/2018).     A total of 25 minutes were spent face to face with the patient and more than half of this time was spent coordinating care and/or counseling.     Isabel Wilder MD

## 2018-01-12 ENCOUNTER — OFFICE VISIT (OUTPATIENT)
Dept: PEDIATRIC CARDIOLOGY | Facility: CLINIC | Age: 28
End: 2018-01-12
Attending: PEDIATRICS
Payer: COMMERCIAL

## 2018-01-12 VITALS
SYSTOLIC BLOOD PRESSURE: 110 MMHG | DIASTOLIC BLOOD PRESSURE: 60 MMHG | HEART RATE: 90 BPM | BODY MASS INDEX: 29.94 KG/M2 | WEIGHT: 162.69 LBS | HEIGHT: 62 IN

## 2018-01-12 DIAGNOSIS — Q24.9 MATERNAL CONGENITAL CARDIAC ANOMALY COMPLICATING PREGNANCY IN SECOND TRIMESTER: Primary | ICD-10-CM

## 2018-01-12 DIAGNOSIS — Q21.11 OSTIUM SECUNDUM TYPE ATRIAL SEPTAL DEFECT: ICD-10-CM

## 2018-01-12 DIAGNOSIS — O35.9XX0 SUSPECTED FETAL ABNORMALITY AFFECTING MANAGEMENT OF MOTHER, ANTEPARTUM, SINGLE OR UNSPECIFIED FETUS: ICD-10-CM

## 2018-01-12 DIAGNOSIS — O99.891 MATERNAL CONGENITAL CARDIAC ANOMALY COMPLICATING PREGNANCY IN SECOND TRIMESTER: Primary | ICD-10-CM

## 2018-01-12 PROCEDURE — 76827 ECHO EXAM OF FETAL HEART: CPT | Mod: S$GLB,,, | Performed by: PEDIATRICS

## 2018-01-12 PROCEDURE — 76825 ECHO EXAM OF FETAL HEART: CPT | Mod: S$GLB,,, | Performed by: PEDIATRICS

## 2018-01-12 PROCEDURE — 93325 DOPPLER ECHO COLOR FLOW MAPG: CPT | Mod: S$GLB,,, | Performed by: PEDIATRICS

## 2018-01-12 PROCEDURE — 99999 PR PBB SHADOW E&M-EST. PATIENT-LVL III: CPT | Mod: PBBFAC,,, | Performed by: PEDIATRICS

## 2018-01-12 PROCEDURE — 99242 OFF/OP CONSLTJ NEW/EST SF 20: CPT | Mod: 25,S$GLB,, | Performed by: PEDIATRICS

## 2018-01-15 ENCOUNTER — OFFICE VISIT (OUTPATIENT)
Dept: FAMILY MEDICINE | Facility: CLINIC | Age: 28
End: 2018-01-15
Payer: COMMERCIAL

## 2018-01-15 VITALS
BODY MASS INDEX: 29.57 KG/M2 | SYSTOLIC BLOOD PRESSURE: 100 MMHG | DIASTOLIC BLOOD PRESSURE: 64 MMHG | WEIGHT: 160.69 LBS | HEART RATE: 121 BPM | RESPIRATION RATE: 16 BRPM | TEMPERATURE: 99 F | OXYGEN SATURATION: 98 % | HEIGHT: 62 IN

## 2018-01-15 DIAGNOSIS — J11.1 INFLUENZA: Primary | ICD-10-CM

## 2018-01-15 PROCEDURE — 99213 OFFICE O/P EST LOW 20 MIN: CPT | Mod: S$GLB,,, | Performed by: NURSE PRACTITIONER

## 2018-01-15 PROCEDURE — 99999 PR PBB SHADOW E&M-EST. PATIENT-LVL IV: CPT | Mod: PBBFAC,,, | Performed by: NURSE PRACTITIONER

## 2018-01-15 RX ORDER — OSELTAMIVIR PHOSPHATE 75 MG/1
75 CAPSULE ORAL 2 TIMES DAILY
Qty: 10 CAPSULE | Refills: 0 | Status: SHIPPED | OUTPATIENT
Start: 2018-01-15 | End: 2018-01-20

## 2018-01-15 NOTE — PROGRESS NOTES
"Ms. Lazar  is a 27 y.o. year old  , referred by Dr. Lopez because of the history of an ASD and diagnosis of lupus (negative for SSA / SSB antibodies).    The patient presented at approximately 31 3/7 weeks gestation (Patient's last menstrual period was 2017 (approximate).).  The patient denied any complaints.    Past medical history: Significant for ASD, S/P transcatheter device closure, and lupus.    Past surgical history: Negative.  Past gestational history: The patient has a 4 y/o daughter with history of PFO, S/P spontaneous closure, and a 2 y/o daughter  With PDA and PFO (F/U Dr. Juarez).    Family history: The patient is adopted.  As far as known negative for congenital heart disease, and sudden death during childhood.      Medications:   Outpatient Encounter Prescriptions as of 2018   Medication Sig Dispense Refill    aspirin (ECOTRIN) 81 MG EC tablet Take 81 mg by mouth once daily.      hydroxychloroquine (PLAQUENIL) 200 mg tablet Take 200 mg by mouth 2 (two) times daily.      PRENATAL VITS62/FA/OM3/DHA/EPA (PRENATAL GUMMY ORAL) Take 1 tablet by mouth once daily.      ondansetron (ZOFRAN) 4 MG tablet Take 8 mg by mouth 2 (two) times daily.      [] predniSONE (DELTASONE) 5 MG tablet Take 1 tablet (5 mg total) by mouth daily as needed. 30 tablet 3     No facility-administered encounter medications on file as of 2018.        Allergies: Codeine and Vicodin [hydrocodone-acetaminophen]    Blood pressure 110/60, pulse 90, height 5' 2" (1.575 m), weight 73.8 kg (162 lb 11.2 oz), last menstrual period 2017, not currently breastfeeding.    Fetal echocardiogram revealed a four chamber fetal heart with situs solitus.  The ventricles appeared to be equal in size.  The contractility of both ventricles was good.  The fetal heart rate was within the normal range, and regular.  The interventricular septum appeared to be intact.  There were normally related great arteries seen.  " The ductal and aortic arch were well visualized, and appeared to be widely patent.  There was no pleural or pericardial effusion seen.    Doppler analysis revealed a three vessel umbilical cord, with normal flow patterns, and velocities by Doppler.  There was a normal flow pattern seen in the ductus venosus.  There was evidence of normal systemic, and pulmonary venous return seen.  There was a normal right to left shunt seen across the foramen ovale.  There were normal inflow patterns seen across the AV-valves, without significant insufficiency.  There was no ventricular level shunt seen.  The right and left ventricular outflow tract, and ductal and aortic arch appeared to be unobstructed.    Impression:  It is our impression that Ms. Lazar had a normal fetal echocardiogram.  As you know, small defects, and coarctation of the aorta cannot always be ruled out on fetal echocardiogram.  We discussed our findings with the patient, reviewed our images, and answered her questions. We also discussed the limitations of fetal echocardiography, but emphasized that her child appears to have normal cardiac anatomy.  No further follow up is scheduled in our clinic, but, of course, we will always be available to reevaluate this patient, if needed.  Time spent: 30 minutes, 50% dedicated to counseling.

## 2018-01-15 NOTE — PROGRESS NOTES
Subjective:       Patient ID: Jen Lazar is a 27 y.o. female.    Chief Complaint: Fever (started last night; daughter dx today with flu ); Sore Throat; Generalized Body Aches; and Cough    Currently 32 weeks pregnant   History of PFO and ASD       Cough   This is a new problem. The current episode started in the past 7 days. Associated symptoms include a fever, nasal congestion, postnasal drip and rhinorrhea. Pertinent negatives include no chest pain, chills, ear congestion, ear pain, headaches, heartburn, hemoptysis, myalgias, rash, sore throat, shortness of breath, sweats, weight loss or wheezing. Associated symptoms comments: 100 .     Vitals:    01/15/18 1220   BP: 100/64   Pulse: (!) 121   Resp: 16   Temp: 98.9 °F (37.2 °C)     Review of Systems   Constitutional: Positive for fever. Negative for chills, diaphoresis, fatigue and weight loss.   HENT: Positive for postnasal drip and rhinorrhea. Negative for ear pain and sore throat.    Eyes: Negative.    Respiratory: Positive for cough. Negative for hemoptysis, shortness of breath and wheezing.    Cardiovascular: Negative.  Negative for chest pain.   Gastrointestinal: Negative.  Negative for abdominal pain, diarrhea, heartburn and nausea.   Endocrine: Negative.    Genitourinary: Negative.  Negative for dysuria and hematuria.   Musculoskeletal: Negative.  Negative for myalgias.   Skin: Negative for color change and rash.   Allergic/Immunologic: Negative.    Neurological: Negative.  Negative for speech difficulty, numbness and headaches.   Hematological: Negative.    Psychiatric/Behavioral: Negative.        Past Medical History:   Diagnosis Date    Anemia     ASD (atrial septal defect)     s/p device occlusion 12/2016    Bundle branch block, right     Fibromyalgia     Ganglion cyst     Headache(784.0)     IBS (irritable bowel syndrome)     Kidney stone     PONV (postoperative nausea and vomiting)     Sinus tachycardia     SLE (systemic lupus  erythematosus related syndrome)     Syncope and collapse      Objective:      Physical Exam   Constitutional: She is oriented to person, place, and time. She appears well-developed and well-nourished.   HENT:   Head: Normocephalic and atraumatic.   Right Ear: External ear normal.   Left Ear: External ear normal.   Nose: Nose normal.   Mouth/Throat: Oropharynx is clear and moist.   Eyes: Conjunctivae and EOM are normal. Pupils are equal, round, and reactive to light.   Neck: Neck supple.   Cardiovascular: Regular rhythm, normal heart sounds and intact distal pulses.  Tachycardia present.  Exam reveals no friction rub.    No murmur heard.  Pulmonary/Chest: Effort normal and breath sounds normal. No respiratory distress. She has no wheezes.   Abdominal: Soft. Bowel sounds are normal.   Musculoskeletal: Normal range of motion.   Neurological: She is alert and oriented to person, place, and time.   Skin: Skin is warm and dry.   Psychiatric: She has a normal mood and affect. Her behavior is normal. Judgment and thought content normal.   Nursing note and vitals reviewed.      Assessment:       1. Influenza        Plan:       Influenza  -     oseltamivir (TAMIFLU) 75 MG capsule; Take 1 capsule (75 mg total) by mouth 2 (two) times daily.  Dispense: 10 capsule; Refill: 0            She is pregnant at this time   Confirmed tamiflu safe with pregnancy   Guided her to nathan her maternal fetal med for OTC meds that she can take   Otherwise saline spray hot shower to help with cough and decongestant

## 2018-01-31 ENCOUNTER — HOSPITAL ENCOUNTER (OUTPATIENT)
Dept: CARDIOLOGY | Facility: CLINIC | Age: 28
Discharge: HOME | End: 2018-01-31
Attending: INTERNAL MEDICINE
Payer: COMMERCIAL

## 2018-01-31 ENCOUNTER — OFFICE VISIT (OUTPATIENT)
Dept: CARDIOLOGY | Facility: CLINIC | Age: 28
End: 2018-01-31
Payer: COMMERCIAL

## 2018-01-31 VITALS
BODY MASS INDEX: 29.74 KG/M2 | WEIGHT: 161.63 LBS | HEIGHT: 62 IN | DIASTOLIC BLOOD PRESSURE: 63 MMHG | HEART RATE: 83 BPM | SYSTOLIC BLOOD PRESSURE: 106 MMHG | OXYGEN SATURATION: 100 %

## 2018-01-31 DIAGNOSIS — Q21.10 ASD (ATRIAL SEPTAL DEFECT): ICD-10-CM

## 2018-01-31 DIAGNOSIS — Q21.12 PFO (PATENT FORAMEN OVALE): Primary | ICD-10-CM

## 2018-01-31 LAB
DIASTOLIC DYSFUNCTION: NO
MITRAL VALVE MOBILITY: NORMAL
RETIRED EF AND QEF - SEE NOTES: 55 (ref 55–65)

## 2018-01-31 PROCEDURE — 99999 PR PBB SHADOW E&M-EST. PATIENT-LVL III: CPT | Mod: PBBFAC,,, | Performed by: INTERNAL MEDICINE

## 2018-01-31 PROCEDURE — 99213 OFFICE O/P EST LOW 20 MIN: CPT | Mod: S$GLB,,, | Performed by: INTERNAL MEDICINE

## 2018-01-31 PROCEDURE — 3008F BODY MASS INDEX DOCD: CPT | Mod: S$GLB,,, | Performed by: INTERNAL MEDICINE

## 2018-01-31 PROCEDURE — 93306 TTE W/DOPPLER COMPLETE: CPT | Mod: S$GLB,,, | Performed by: INTERNAL MEDICINE

## 2018-01-31 NOTE — PROGRESS NOTES
Cardiology Clinic Note  Reason for Visit: 1 year follow up post PFO closure    HPI:   Ms. Jen Lazar is a 27 y.o. woman presenting to clinic today for 1 yr follow up after her PFO closure 12/2016.  Evidence of PFO first found on CFD echo 5/2016. She reports resolution of shortness of breath, chest pain, headaches post closure.  She has had no transient neurologic defecits.  Currently taking baby asa and compliant.  During middle of pregnancy (around 11/2017) patient reports return of aura without headache which necessitated magnesium infusions.    She is currently 34 wks pregnant.  Of note, she was diagnosed with SLE within the last year with the characteristic malar butterfly facial rash, joint swelling, and photosensitivity.  She is currently on plaguenil and prn steroids.  Past medical history sig for Migraine headaches, fibromyalgia, and PFO.    ROS:    Review of Systems   Constitution: Negative.   HENT: Negative.    Eyes: Negative.    Cardiovascular: Negative.    Respiratory: Negative.    Endocrine: Negative.    Skin: Negative.    Musculoskeletal: Negative.    Gastrointestinal: Negative.    Genitourinary: Negative.    Neurological: Positive for headaches.     PMH:     Past Medical History:   Diagnosis Date    Anemia     ASD (atrial septal defect)     s/p device occlusion 12/2016    Bundle branch block, right     Fibromyalgia     Ganglion cyst     Headache(784.0)     IBS (irritable bowel syndrome)     Kidney stone     PONV (postoperative nausea and vomiting)     Sinus tachycardia     SLE (systemic lupus erythematosus related syndrome)     Syncope and collapse      Past Surgical History:   Procedure Laterality Date    ASD REPAIR  12/2016    BREAST SURGERY      breast augmentation    GANGLION CYST EXCISION Left     KNEE ARTHROSCOPY Right      Allergies:     Review of patient's allergies indicates:   Allergen Reactions    Codeine Nausea And Vomiting    Vicodin  [hydrocodone-acetaminophen] Nausea And Vomiting     Medications:     Current Outpatient Prescriptions on File Prior to Visit   Medication Sig Dispense Refill    aspirin (ECOTRIN) 81 MG EC tablet Take 81 mg by mouth once daily.      hydroxychloroquine (PLAQUENIL) 200 mg tablet Take 200 mg by mouth 2 (two) times daily.      ondansetron (ZOFRAN) 4 MG tablet Take 8 mg by mouth 2 (two) times daily.      PRENATAL VITS62/FA/OM3/DHA/EPA (PRENATAL GUMMY ORAL) Take 1 tablet by mouth once daily.       No current facility-administered medications on file prior to visit.      Social History:     Social History   Substance Use Topics    Smoking status: Never Smoker    Smokeless tobacco: Never Used    Alcohol use No     Family History:     Family History   Problem Relation Age of Onset    Adopted: Yes    Anemia Mother     Fainting Mother     Hypertension Father     Arrhythmia Father     Congenital heart disease Daughter      pda    No Known Problems Maternal Grandmother     No Known Problems Maternal Grandfather     No Known Problems Paternal Grandmother     No Known Problems Paternal Grandfather     No Known Problems Maternal Aunt     No Known Problems Maternal Uncle     No Known Problems Paternal Aunt     No Known Problems Paternal Uncle     Asthma Neg Hx     Clotting disorder Neg Hx     Heart attack Neg Hx     Heart disease Neg Hx     Heart failure Neg Hx     Hyperlipidemia Neg Hx     Stroke Neg Hx     Atrial Septal Defect Neg Hx     Pacemaker/defibrilator Neg Hx     Early death Neg Hx      Physical Exam:   LMP 04/17/2017 (Approximate)    Physical Exam   Constitutional: She is oriented to person, place, and time. She appears well-developed and well-nourished.   HENT:   Head: Normocephalic and atraumatic.   Eyes: Conjunctivae and EOM are normal. Pupils are equal, round, and reactive to light.   Neck: Normal range of motion. Neck supple. No JVD present.   Cardiovascular: Normal rate and regular  rhythm.  Exam reveals no gallop and no friction rub.    Murmur heard.  2/6, pansystolic, throughout precordium   Pulmonary/Chest: Effort normal and breath sounds normal. No respiratory distress. She has no wheezes. She has no rales. She exhibits no tenderness.   Abdominal: Soft. Bowel sounds are normal. She exhibits no distension. There is no tenderness.   Musculoskeletal: Normal range of motion. She exhibits edema. She exhibits no tenderness.   Mild, bilateral le non-pitting   Neurological: She is alert and oriented to person, place, and time.   Skin: Skin is warm and dry. No erythema. No pallor.       Labs:     Lab Results   Component Value Date     06/05/2017    K 3.8 06/05/2017     06/05/2017    CO2 26 06/05/2017    BUN 13 06/05/2017    CREATININE 0.59 06/05/2017    CREATININE 0.7 05/18/2013    GLUCOSE 91 05/18/2013    ANIONGAP 11 06/05/2017    ANIONGAP 10 05/18/2013     No results found for: HGBA1C  No results found for: BNP, BNPTRIAGEBLO Lab Results   Component Value Date    WBC 6.27 06/05/2017    HGB 12.5 06/05/2017    HGB 10.6 (L) 05/18/2013    HCT 38.1 06/05/2017     06/05/2017    GRAN 4.3 06/05/2017    GRAN 68.6 06/05/2017     Lab Results   Component Value Date    CHOL 185 03/22/2013    HDL 71 03/22/2013    LDLCALC 95.0 03/22/2013    TRIG 96 03/22/2013          Imaging:   PFO closure 12/2016:    D. Hemodynamic Results     BP: 103/59  SPO2: 100  PA: 19/6 (12)  PW: 8/12 (5)  RPA: 17/5 (10)  RV: 20  RVEDP: 4     RA: 3  RA: /12 (6)  LA: 14/12 (6)  RV_SAT: 85  PA_SAT: 87  RPA_SAT: 87    E. Angiographic Results     Diagnostic:        - Left Pulmonary Artery:             The Left Pulmonary Artery is normal. There is DANIELE 3 flow.     - Right Pulmonary Artery:             The Right Pulmonary Artery is normal. There is DANIELE 3 flow.      Intervention          PFO:             The PFO was successfully occluded. The following items were used: 24mm AGA Sizing Balloon and Occluder Septal Cardioform  25mm.    Prelim reading of echo today:  EF preserved, no evidence of flow across IAS in apical4 and SC views.  Prosthesis is well-seated without any significant motion.    EKG: not done today  Assessment:     1. PFO (patent foramen ovale)          Plan:   PFO (patent foramen ovale)  Continue baby asa daily  Follow up in clinic in 1 yr  Echo prior to next visit      Discussed with Dr. Crisostomo. RTC in 1 year.     Signed:  Juan Peace MD  1/31/2018 8:37 AM

## 2018-02-08 PROBLEM — O99.891 SYSTEMIC LUPUS ERYTHEMATOSUS (SLE) AFFECTING PREGNANCY, ANTEPARTUM: Status: ACTIVE | Noted: 2018-02-08

## 2018-02-08 PROBLEM — M32.9 SYSTEMIC LUPUS ERYTHEMATOSUS (SLE) AFFECTING PREGNANCY, ANTEPARTUM: Status: ACTIVE | Noted: 2018-02-08

## 2018-02-27 PROBLEM — O47.9 IRREGULAR CONTRACTIONS: Status: ACTIVE | Noted: 2018-02-27

## 2018-03-06 PROBLEM — Z34.90 TERM PREGNANCY: Status: ACTIVE | Noted: 2018-03-06

## 2018-03-28 ENCOUNTER — OFFICE VISIT (OUTPATIENT)
Dept: FAMILY MEDICINE | Facility: CLINIC | Age: 28
End: 2018-03-28
Payer: COMMERCIAL

## 2018-03-28 VITALS
WEIGHT: 148.56 LBS | OXYGEN SATURATION: 98 % | DIASTOLIC BLOOD PRESSURE: 70 MMHG | SYSTOLIC BLOOD PRESSURE: 112 MMHG | HEART RATE: 102 BPM | HEIGHT: 62 IN | TEMPERATURE: 100 F | BODY MASS INDEX: 27.34 KG/M2

## 2018-03-28 DIAGNOSIS — R50.9 FEVER, UNSPECIFIED FEVER CAUSE: Primary | ICD-10-CM

## 2018-03-28 LAB
DEPRECATED S PYO AG THROAT QL EIA: NEGATIVE
FLUAV AG SPEC QL IA: NEGATIVE
FLUBV AG SPEC QL IA: NEGATIVE
SPECIMEN SOURCE: NORMAL

## 2018-03-28 PROCEDURE — 87081 CULTURE SCREEN ONLY: CPT

## 2018-03-28 PROCEDURE — 99999 PR PBB SHADOW E&M-EST. PATIENT-LVL IV: CPT | Mod: PBBFAC,,, | Performed by: NURSE PRACTITIONER

## 2018-03-28 PROCEDURE — 99213 OFFICE O/P EST LOW 20 MIN: CPT | Mod: S$GLB,,, | Performed by: NURSE PRACTITIONER

## 2018-03-28 PROCEDURE — 87880 STREP A ASSAY W/OPTIC: CPT | Mod: PO

## 2018-03-28 PROCEDURE — 87400 INFLUENZA A/B EACH AG IA: CPT | Mod: PO

## 2018-03-28 RX ORDER — ACETAMINOPHEN 325 MG/1
325 TABLET ORAL EVERY 6 HOURS PRN
COMMUNITY
End: 2019-12-27

## 2018-03-28 RX ORDER — PROMETHAZINE HYDROCHLORIDE AND DEXTROMETHORPHAN HYDROBROMIDE 6.25; 15 MG/5ML; MG/5ML
5 SYRUP ORAL 3 TIMES DAILY PRN
Qty: 240 ML | Refills: 0 | Status: SHIPPED | OUTPATIENT
Start: 2018-03-28 | End: 2018-04-07

## 2018-03-28 RX ORDER — AZELASTINE 1 MG/ML
1 SPRAY, METERED NASAL 2 TIMES DAILY
Qty: 30 ML | Refills: 0 | Status: SHIPPED | OUTPATIENT
Start: 2018-03-28 | End: 2018-10-11

## 2018-03-28 RX ORDER — AZITHROMYCIN 250 MG/1
250 TABLET, FILM COATED ORAL DAILY
Qty: 6 TABLET | Refills: 0 | Status: SHIPPED | OUTPATIENT
Start: 2018-03-28 | End: 2018-04-02

## 2018-03-28 NOTE — PROGRESS NOTES
Subjective:       Patient ID: Jen Lazar is a 28 y.o. female.    Chief Complaint: Sore Throat; Fever; and Dizziness    Patient delivered vaginally 3 weeks ago. She does have lupus, sees rheumatology, takes plaquenil daily..  Last night started with fever and chills. This am sore throat, fever, and chills, and body aches. Has taken tylenol, last at noon.    Influenza Vaccine due on 08/01/2017    Past Medical History:  Past Medical History:  No date: Anemia  No date: ASD (atrial septal defect)      Comment: s/p device occlusion 12/2016  No date: Bundle branch block, right  No date: Fibromyalgia      Comment: pt denies  No date: Ganglion cyst  No date: Headache(784.0)  No date: IBS (irritable bowel syndrome)  No date: Kidney stone  No date: PONV (postoperative nausea and vomiting)  No date: Sinus tachycardia  No date: SLE (systemic lupus erythematosus related synd*  No date: Syncope and collapse  Past Surgical History:  12/2016: ASD REPAIR  No date: BREAST SURGERY      Comment: breast augmentation  No date: GANGLION CYST EXCISION Left  No date: KNEE ARTHROSCOPY Right  Review of patient's allergies indicates:   -- Codeine -- Nausea And Vomiting   -- Vicodin (hydrocodone-acetaminophen) -- Nausea And Vomiting  Current Outpatient Prescriptions on File Prior to Visit:  hydroxychloroquine (PLAQUENIL) 200 mg tablet, Take 200 mg by mouth 2 (two) times daily., Disp: , Rfl:   oxyCODONE-acetaminophen (PERCOCET) 5-325 mg per tablet, Take 1 tablet by mouth every 4 (four) hours as needed., Disp: 10 tablet, Rfl: 0  (DISCONTINUED) aspirin (ECOTRIN) 81 MG EC tablet, Take 81 mg by mouth once daily., Disp: , Rfl:   (DISCONTINUED) ibuprofen (ADVIL,MOTRIN) 800 MG tablet, Take 1 tablet (800 mg total) by mouth every 8 (eight) hours as needed (cramping)., Disp: 15 tablet, Rfl: 0  (DISCONTINUED) PRENATAL VITS62/FA/OM3/DHA/EPA (PRENATAL GUMMY ORAL), Take 1 tablet by mouth once daily., Disp: , Rfl:     No current facility-administered  medications on file prior to visit.     Social History    Marital status:              Spouse name:                       Years of education:                 Number of children: 1             Occupational History  Occupation          Employer            Comment               Housewife                                   Social History Main Topics    Smoking status: Never Smoker                                                                Smokeless tobacco: Never Used                        Alcohol use: No              Drug use: No              Sexual activity: Yes               Partners with: Male    Other Topics            Concern    None on file    Social History Narrative    None on file      Review of patient's family history indicates:  Adopted:  Yes      Anemia                         Mother                    Fainting                       Mother                    Hypertension                   Father                    Arrhythmia                     Father                    Congenital heart disease       Daughter                    Comment: pda    No Known Problems              Maternal Grandmother      No Known Problems              Maternal Grandfather      No Known Problems              Paternal Grandmother      No Known Problems              Paternal Grandfather      No Known Problems              Maternal Aunt             No Known Problems              Maternal Uncle            No Known Problems              Paternal Aunt             No Known Problems              Paternal Uncle            Asthma                         Neg Hx                    Clotting disorder              Neg Hx                    Heart attack                   Neg Hx                    Heart disease                  Neg Hx                    Heart failure                  Neg Hx                    Hyperlipidemia                 Neg Hx                    Stroke                         Neg Hx                    Atrial Septal  Defect           Neg Hx                    Pacemaker/defibrilator         Neg Hx                    Early death                    Neg Hx                            Review of Systems   Constitutional: Positive for chills, diaphoresis, fatigue and fever.   HENT: Positive for sore throat. Negative for postnasal drip, rhinorrhea and sneezing.    Respiratory: Negative for cough, shortness of breath and wheezing.    Cardiovascular: Negative for chest pain.   Gastrointestinal: Negative for diarrhea, nausea and vomiting.   Genitourinary: Negative.  Negative for dysuria and hematuria.   Musculoskeletal: Positive for arthralgias.   Neurological: Positive for light-headedness and headaches. Negative for dizziness.       Objective:      Physical Exam   Constitutional: She is oriented to person, place, and time.   HENT:   Head: Normocephalic and atraumatic. Head is without Uriostegui's sign.   Right Ear: No middle ear effusion.   Left Ear:  No middle ear effusion.   Nose: No mucosal edema. Right sinus exhibits no maxillary sinus tenderness and no frontal sinus tenderness. Left sinus exhibits no maxillary sinus tenderness and no frontal sinus tenderness.   Mouth/Throat: Uvula is midline. Posterior oropharyngeal erythema present.   Neck: Normal range of motion.   Cardiovascular: Normal rate and regular rhythm.    Pulmonary/Chest: Effort normal and breath sounds normal.   Abdominal: Soft. Bowel sounds are normal. She exhibits no distension. There is no tenderness.   Musculoskeletal: She exhibits no deformity.   Lymphadenopathy:     She has no cervical adenopathy.   Neurological: She is alert and oriented to person, place, and time.   Psychiatric: She has a normal mood and affect. Her behavior is normal.       Assessment:       1. Fever, unspecified fever cause        Plan:       1. Fever, unspecified fever cause  Flu and strep negative. Likely viral illness. Tylenol, stay hydrated. Astelin and phenergan dm prmn. Because it is a  holiday weekend, patient has lupus, and patient has 3 week old child, have sent a prescription for zithromax for her to start if symptoms worsen or if they are not improving. Follow up for new or worsening symptoms or as needed.   - Influenza antigen Nasal Swab  - THROAT SCREEN, RAPID

## 2018-03-31 LAB — BACTERIA THROAT CULT: NORMAL

## 2018-07-06 ENCOUNTER — LAB VISIT (OUTPATIENT)
Dept: LAB | Facility: HOSPITAL | Age: 28
End: 2018-07-06
Attending: INTERNAL MEDICINE
Payer: COMMERCIAL

## 2018-07-06 ENCOUNTER — TELEPHONE (OUTPATIENT)
Dept: RHEUMATOLOGY | Facility: CLINIC | Age: 28
End: 2018-07-06

## 2018-07-06 ENCOUNTER — OFFICE VISIT (OUTPATIENT)
Dept: FAMILY MEDICINE | Facility: CLINIC | Age: 28
End: 2018-07-06
Payer: COMMERCIAL

## 2018-07-06 VITALS
OXYGEN SATURATION: 98 % | SYSTOLIC BLOOD PRESSURE: 115 MMHG | HEIGHT: 62 IN | HEART RATE: 70 BPM | DIASTOLIC BLOOD PRESSURE: 70 MMHG | BODY MASS INDEX: 26.53 KG/M2 | WEIGHT: 144.19 LBS

## 2018-07-06 DIAGNOSIS — Z34.90 GRAVIDA 3, CURRENTLY PREGNANT: ICD-10-CM

## 2018-07-06 DIAGNOSIS — R42 LIGHT HEADED: Primary | ICD-10-CM

## 2018-07-06 DIAGNOSIS — M32.9 SYSTEMIC LUPUS ERYTHEMATOSUS, UNSPECIFIED SLE TYPE, UNSPECIFIED ORGAN INVOLVEMENT STATUS: ICD-10-CM

## 2018-07-06 DIAGNOSIS — J06.9 UPPER RESPIRATORY TRACT INFECTION, UNSPECIFIED TYPE: ICD-10-CM

## 2018-07-06 LAB
ALBUMIN SERPL BCP-MCNC: 4 G/DL
ALP SERPL-CCNC: 85 U/L
ALT SERPL W/O P-5'-P-CCNC: 18 U/L
ANION GAP SERPL CALC-SCNC: 7 MMOL/L
AST SERPL-CCNC: 18 U/L
BILIRUB SERPL-MCNC: 0.5 MG/DL
BUN SERPL-MCNC: 16 MG/DL
C3 SERPL-MCNC: 96 MG/DL
C4 SERPL-MCNC: 21 MG/DL
CALCIUM SERPL-MCNC: 9.5 MG/DL
CHLORIDE SERPL-SCNC: 104 MMOL/L
CO2 SERPL-SCNC: 27 MMOL/L
CREAT SERPL-MCNC: 0.9 MG/DL
CRP SERPL-MCNC: 1.8 MG/L
ERYTHROCYTE [SEDIMENTATION RATE] IN BLOOD BY WESTERGREN METHOD: 10 MM/HR
EST. GFR  (AFRICAN AMERICAN): >60 ML/MIN/1.73 M^2
EST. GFR  (NON AFRICAN AMERICAN): >60 ML/MIN/1.73 M^2
GLUCOSE SERPL-MCNC: 82 MG/DL
POTASSIUM SERPL-SCNC: 4.1 MMOL/L
PROT SERPL-MCNC: 7.4 G/DL
SODIUM SERPL-SCNC: 138 MMOL/L

## 2018-07-06 PROCEDURE — 86160 COMPLEMENT ANTIGEN: CPT | Mod: 59

## 2018-07-06 PROCEDURE — 99999 PR PBB SHADOW E&M-EST. PATIENT-LVL III: CPT | Mod: PBBFAC,,, | Performed by: PHYSICIAN ASSISTANT

## 2018-07-06 PROCEDURE — 85651 RBC SED RATE NONAUTOMATED: CPT | Mod: PO

## 2018-07-06 PROCEDURE — 80053 COMPREHEN METABOLIC PANEL: CPT

## 2018-07-06 PROCEDURE — 86235 NUCLEAR ANTIGEN ANTIBODY: CPT

## 2018-07-06 PROCEDURE — 86160 COMPLEMENT ANTIGEN: CPT

## 2018-07-06 PROCEDURE — 86038 ANTINUCLEAR ANTIBODIES: CPT

## 2018-07-06 PROCEDURE — 86235 NUCLEAR ANTIGEN ANTIBODY: CPT | Mod: 59

## 2018-07-06 PROCEDURE — 99214 OFFICE O/P EST MOD 30 MIN: CPT | Mod: S$GLB,,, | Performed by: PHYSICIAN ASSISTANT

## 2018-07-06 PROCEDURE — 3008F BODY MASS INDEX DOCD: CPT | Mod: CPTII,S$GLB,, | Performed by: PHYSICIAN ASSISTANT

## 2018-07-06 PROCEDURE — 83516 IMMUNOASSAY NONANTIBODY: CPT

## 2018-07-06 PROCEDURE — 86225 DNA ANTIBODY NATIVE: CPT

## 2018-07-06 PROCEDURE — 86140 C-REACTIVE PROTEIN: CPT

## 2018-07-06 PROCEDURE — 86039 ANTINUCLEAR ANTIBODIES (ANA): CPT

## 2018-07-06 RX ORDER — HYDROXYCHLOROQUINE SULFATE 200 MG/1
TABLET, FILM COATED ORAL
COMMUNITY
End: 2018-07-06 | Stop reason: SDUPTHER

## 2018-07-06 RX ORDER — PREDNISONE 5 MG/1
TABLET ORAL
COMMUNITY
End: 2019-06-26

## 2018-07-06 NOTE — PROGRESS NOTES
Subjective:       Patient ID: Jen Lazar is a 28 y.o. female    Chief Complaint: Sore Throat (and sinus pressure)    HPI  Mrs Lazar presents today CO sore throat and feeling light headed for the past 24 hours.  One of her children was recently sick with a viral infection. She denies any fever or chills.  She is going on vacation tomorrow and she wants to feel better.      Review of Systems   Constitutional: Positive for fatigue. Negative for chills and fever.   HENT: Positive for sore throat. Negative for congestion.    Eyes: Negative for visual disturbance.   Respiratory: Negative for cough.    Cardiovascular: Negative for chest pain.   Gastrointestinal: Negative for abdominal pain, diarrhea, nausea and vomiting.   Skin: Negative for rash.   Neurological: Negative for headaches.        Objective:   Physical Exam   Constitutional: She is oriented to person, place, and time. She appears well-developed and well-nourished. No distress.   HENT:   Head: Normocephalic and atraumatic.   Eyes: EOM are normal. Pupils are equal, round, and reactive to light.   Neck: Normal range of motion. Neck supple.   Cardiovascular: Normal rate, regular rhythm, normal heart sounds and intact distal pulses.  Exam reveals no gallop and no friction rub.    No murmur heard.  Pulmonary/Chest: Effort normal and breath sounds normal. No respiratory distress. She has no wheezes. She has no rales. She exhibits no tenderness.   Abdominal: Soft. Bowel sounds are normal. She exhibits no distension and no mass. There is no tenderness. There is no guarding.   Musculoskeletal: Normal range of motion.   Neurological: She is alert and oriented to person, place, and time.   Skin: Skin is warm and dry. No rash noted. She is not diaphoretic.   Psychiatric: She has a normal mood and affect. Her behavior is normal. Judgment and thought content normal.        Assessment:       1. Light headed  CBC auto differential    TSH   2. Upper respiratory tract  infection, unspecified type          Plan:       Light headed  -     CBC auto differential; Future; Expected date: 07/06/2018  -     TSH; Future; Expected date: 07/06/2018    Upper respiratory tract infection, unspecified type  Most UPPER RESPIRATORY INFECTIONS are caused by viruses.    Antibiotics will not make the infection clear more quickly.  Using antibiotics when they are not needed can cause germs that cause infections to become resistant, making them more difficult to cure.  Therefore, no antibiotics are prescribed today.      Increase your water intake to 64-80 oz daily, unless otherwise restricted.    Nasal Saline spray (Over the counter [place] spray or [place])  2 sprays each nostril 2-3 times a day for nasal congestion    Tylenol 500 mg 2 tablets or Ibuprofen 200 mg 2 tablets every 4-6 hours as needed (unless a doctor has told you not to take Tyenol or ibuprofen) for fever, headaches, sore throat, ear pain, bodyaches, and/or nasal/sinus inflammation    Warm salt water gargles with 1/2 cup water and 1 tablespoon salt every 4 hours    Call here or follow up with your primary care provider in 1 week if symptoms do not resolve

## 2018-07-06 NOTE — TELEPHONE ENCOUNTER
Spoke to pt, advised per Dr. Mims that she can still do labs and urine today. Pt verbalized understanding.

## 2018-07-06 NOTE — TELEPHONE ENCOUNTER
----- Message from Patricia Curtis sent at 7/6/2018  9:48 AM CDT -----  Contact: Self  Patient is scheduled today for urine and lab test today but she is sick with a sinus infection.  Requesting advice to see if she should wait to do the labs another time or can she do them today.  Call back at [phone] (home).  Thanks

## 2018-07-07 LAB
ANTI SM ANTIBODY: 0.76 EU
ANTI SM/RNP ANTIBODY: 0.98 EU
ANTI-SM INTERPRETATION: NEGATIVE
ANTI-SM/RNP INTERPRETATION: NEGATIVE
ANTI-SSA ANTIBODY: 1.15 EU
ANTI-SSA INTERPRETATION: NEGATIVE
ANTI-SSB ANTIBODY: 1.43 EU
ANTI-SSB INTERPRETATION: NEGATIVE

## 2018-07-09 DIAGNOSIS — D64.9 ANEMIA, UNSPECIFIED TYPE: Primary | ICD-10-CM

## 2018-07-09 LAB
ANA SER QL IF: POSITIVE
ANA TITR SER IF: NORMAL {TITER}
DSDNA AB SER-ACNC: NORMAL [IU]/ML
ENA SCL70 AB SER-ACNC: 3 UNITS

## 2018-07-10 LAB — HISTONE IGG SER IA-ACNC: 0.5 UNITS (ref 0–0.9)

## 2018-07-17 ENCOUNTER — TELEPHONE (OUTPATIENT)
Dept: RHEUMATOLOGY | Facility: CLINIC | Age: 28
End: 2018-07-17

## 2018-07-17 NOTE — TELEPHONE ENCOUNTER
----- Message from Rafaela Keane sent at 7/16/2018 12:22 PM CDT -----  Contact: self  Patient needs to reschedule 07/17/18 appointment. [place] the [place] Please call patient at [phone]. Thanks!

## 2018-07-18 ENCOUNTER — OFFICE VISIT (OUTPATIENT)
Dept: RHEUMATOLOGY | Facility: CLINIC | Age: 28
End: 2018-07-18
Payer: COMMERCIAL

## 2018-07-18 VITALS
SYSTOLIC BLOOD PRESSURE: 97 MMHG | HEIGHT: 62 IN | HEART RATE: 83 BPM | WEIGHT: 144.19 LBS | BODY MASS INDEX: 26.53 KG/M2 | DIASTOLIC BLOOD PRESSURE: 70 MMHG

## 2018-07-18 DIAGNOSIS — M25.561 CHRONIC PAIN OF BOTH KNEES: ICD-10-CM

## 2018-07-18 DIAGNOSIS — M32.9 SYSTEMIC LUPUS ERYTHEMATOSUS, UNSPECIFIED SLE TYPE, UNSPECIFIED ORGAN INVOLVEMENT STATUS: Primary | ICD-10-CM

## 2018-07-18 DIAGNOSIS — G89.29 CHRONIC PAIN OF BOTH KNEES: ICD-10-CM

## 2018-07-18 DIAGNOSIS — M25.562 CHRONIC PAIN OF BOTH KNEES: ICD-10-CM

## 2018-07-18 PROCEDURE — 3008F BODY MASS INDEX DOCD: CPT | Mod: CPTII,S$GLB,, | Performed by: INTERNAL MEDICINE

## 2018-07-18 PROCEDURE — 96372 THER/PROPH/DIAG INJ SC/IM: CPT | Mod: S$GLB,,, | Performed by: INTERNAL MEDICINE

## 2018-07-18 PROCEDURE — 99214 OFFICE O/P EST MOD 30 MIN: CPT | Mod: 25,S$GLB,, | Performed by: INTERNAL MEDICINE

## 2018-07-18 PROCEDURE — 99999 PR PBB SHADOW E&M-EST. PATIENT-LVL III: CPT | Mod: PBBFAC,,, | Performed by: INTERNAL MEDICINE

## 2018-07-18 RX ORDER — DEXAMETHASONE SODIUM PHOSPHATE 4 MG/ML
4 INJECTION, SOLUTION INTRA-ARTICULAR; INTRALESIONAL; INTRAMUSCULAR; INTRAVENOUS; SOFT TISSUE
Status: COMPLETED | OUTPATIENT
Start: 2018-07-18 | End: 2018-07-18

## 2018-07-18 RX ORDER — HYDROXYCHLOROQUINE SULFATE 200 MG/1
200 TABLET, FILM COATED ORAL 2 TIMES DAILY
Qty: 60 TABLET | Refills: 7 | Status: SHIPPED | OUTPATIENT
Start: 2018-07-18 | End: 2019-06-26 | Stop reason: SDUPTHER

## 2018-07-18 RX ORDER — PREDNISONE 5 MG/1
10 TABLET ORAL DAILY
Qty: 60 TABLET | Refills: 5 | Status: SHIPPED | OUTPATIENT
Start: 2018-07-18 | End: 2018-08-17

## 2018-07-18 RX ADMIN — DEXAMETHASONE SODIUM PHOSPHATE 4 MG: 4 INJECTION, SOLUTION INTRA-ARTICULAR; INTRALESIONAL; INTRAMUSCULAR; INTRAVENOUS; SOFT TISSUE at 02:07

## 2018-07-18 ASSESSMENT — ROUTINE ASSESSMENT OF PATIENT INDEX DATA (RAPID3)
MDHAQ FUNCTION SCORE: .3
PATIENT GLOBAL ASSESSMENT SCORE: 2
PSYCHOLOGICAL DISTRESS SCORE: 1.1
FATIGUE SCORE: 6
WHEN YOU AWAKENED IN THE MORNING OVER THE LAST WEEK, PLEASE INDICATE THE AMOUNT OF TIME IT TAKES UNTIL YOU ARE AS LIMBER AS YOU WILL BE FOR THE DAY: 2
AM STIFFNESS SCORE: 1, YES
PAIN SCORE: 6.5
TOTAL RAPID3 SCORE: 3.17

## 2018-07-18 NOTE — PROGRESS NOTES
Subjective:       Patient ID: Jen Lazar is a 28 y.o. female.    Chief Complaint: Disease Management (5mth f/u stating that she had a flare up last week stating that her left ankle swelled. pt c/o elbow pain)    Follow up:Lupus , was pregnant last visit baby in 3 months  Old, no  sle. She went to the beach and had a flare. doing well slightly swollen mcp,pip, wrist, and malar rash in the PM Symptoms include malar rash, photosensitivity, oral ulcers and arthritis and are of mild severity.  Symptoms are made worse by: cold exposure and movement.  Symptoms are helped by nothing.  Associated symptoms include alopecia, arthralgia, depression and oral ulcers. Patient denies associated seizures.               She complains of joint swelling. Associated symptoms include fatigue and myalgias. Pertinent negatives include no dysuria, fever, trouble swallowing or headaches.         Review of Systems   Constitutional: Positive for fatigue. Negative for activity change, appetite change, chills, diaphoresis, fever and unexpected weight change.   HENT: Negative for congestion, dental problem, ear discharge, ear pain, facial swelling, mouth sores, nosebleeds, postnasal drip, rhinorrhea, sinus pressure, sneezing, sore throat, tinnitus, trouble swallowing and voice change.    Eyes: Negative for photophobia, pain, discharge, redness and itching.   Respiratory: Negative for apnea, cough, chest tightness, shortness of breath and wheezing.    Cardiovascular: Positive for leg swelling. Negative for chest pain and palpitations.   Gastrointestinal: Positive for abdominal pain. Negative for abdominal distention, constipation, diarrhea, nausea and vomiting.   Endocrine: Negative for cold intolerance, heat intolerance, polydipsia and polyuria.   Genitourinary: Negative for decreased urine volume, difficulty urinating, dysuria, flank pain, frequency, hematuria and urgency.   Musculoskeletal: Positive for arthralgias, joint  "swelling, myalgias, neck pain and neck stiffness. Negative for back pain and gait problem.   Skin: Negative for pallor, rash and wound.   Allergic/Immunologic: Negative for immunocompromised state.   Neurological: Positive for weakness. Negative for dizziness, tremors, numbness and headaches.   Hematological: Negative for adenopathy. Does not bruise/bleed easily.   Psychiatric/Behavioral: Negative for sleep disturbance. The patient is not nervous/anxious.          Objective:     BP 97/70   Pulse 83   Ht 5' 2" (1.575 m)   Wt 65.4 kg (144 lb 2.9 oz)   BMI 26.37 kg/m²      Physical Exam   Vitals reviewed.  Constitutional: She is oriented to person, place, and time and well-developed, well-nourished, and in no distress.   HENT:   Head: Normocephalic and atraumatic.   Mouth/Throat: Oropharynx is clear and moist.   Eyes: EOM are normal. Pupils are equal, round, and reactive to light.   Neck: Neck supple. No thyromegaly present.   Cardiovascular: Normal rate, regular rhythm and normal heart sounds.  Exam reveals no gallop and no friction rub.    No murmur heard.  Pulmonary/Chest: Breath sounds normal. She has no wheezes. She has no rales. She exhibits no tenderness.   Abdominal: There is no tenderness. There is no rebound and no guarding.       Right Side Rheumatological Exam     Examination finds the elbow normal.    The patient is tender to palpation of the shoulder, wrist, knee, 1st PIP, 1st MCP, 2nd PIP, 2nd MCP, 3rd PIP, 3rd MCP, 4th PIP, 4th MCP, 5th PIP and 5th MCP    Shoulder Exam   Tenderness Location: acromion    Range of Motion   Active Abduction: abnormal   Adduction: abnormal  Sensation: normal    Knee Exam   Tenderness Location: medial joint line and LCL  Patellofemoral Crepitus: positive  Effusion: positive  Sensation: normal    Hip Exam   Tenderness Location: posterior and lateral  Sensation: normal    Elbow/Wrist Exam   Tenderness Location: no tenderness  Sensation: normal    Left Side Rheumatological " Exam     The patient is tender to palpation of the shoulder, elbow, wrist, knee, 1st PIP, 1st MCP, 2nd PIP, 2nd MCP, 3rd PIP, 3rd MCP, 4th PIP, 4th MCP, 5th PIP and 5th MCP.    Shoulder Exam   Tenderness Location: acromion    Range of Motion   Active Abduction: abnormal   Sensation: normal    Knee Exam   Tenderness Location: lateral joint line and medial joint line    Patellofemoral Crepitus: positive  Effusion: positive  Sensation: normal    Hip Exam   Tenderness Location: posterior and lateral  Sensation: normal    Elbow/Wrist Exam   Sensation: normal      Back/Neck Exam   Tenderness Right paramedian tenderness of the Upper C-Spine, Upper T-Spine, Upper L-Spine and SI Joint.Left paramedian tenderness of the Upper C-Spine, Upper T-Spine, SI Joint and Upper L-Spine.      Lymphadenopathy:     She has no cervical adenopathy.   Neurological: She is alert and oriented to person, place, and time. Gait normal.   Skin: Rash noted. No erythema. No pallor.     Psychiatric: Mood, memory, affect and judgment normal.   Musculoskeletal: She exhibits tenderness and deformity. She exhibits no edema.           Results for orders placed or performed in visit on 07/06/18   CBC auto differential   Result Value Ref Range    WBC 6.65 3.90 - 12.70 K/uL    RBC 4.16 4.00 - 5.40 M/uL    Hemoglobin 11.1 (L) 12.0 - 16.0 g/dL    Hematocrit 35.9 (L) 37.0 - 48.5 %    MCV 86 82 - 98 fL    MCH 26.7 (L) 27.0 - 31.0 pg    MCHC 30.9 (L) 32.0 - 36.0 g/dL    RDW 13.5 11.5 - 14.5 %    Platelets 252 150 - 350 K/uL    MPV 11.5 9.2 - 12.9 fL    Immature Granulocytes 0.3 0.0 - 0.5 %    Gran # (ANC) 4.8 1.8 - 7.7 K/uL    Immature Grans (Abs) 0.02 0.00 - 0.04 K/uL    Lymph # 1.3 1.0 - 4.8 K/uL    Mono # 0.4 0.3 - 1.0 K/uL    Eos # 0.1 0.0 - 0.5 K/uL    Baso # 0.06 0.00 - 0.20 K/uL    nRBC 0 0 /100 WBC    Gran% 72.4 38.0 - 73.0 %    Lymph% 18.9 18.0 - 48.0 %    Mono% 5.7 4.0 - 15.0 %    Eosinophil% 1.8 0.0 - 8.0 %    Basophil% 0.9 0.0 - 1.9 %    Differential  Method Automated    TSH   Result Value Ref Range    TSH 0.366 (L) 0.400 - 4.000 uIU/mL   T4, free   Result Value Ref Range    Free T4 0.91 0.71 - 1.51 ng/dL         Assessment:       1. Systemic lupus erythematosus, unspecified SLE type, unspecified organ involvement status    2. Chronic pain of both knees            Plan:       Jen was seen today for disease management.    Diagnoses and all orders for this visit:    Systemic lupus erythematosus, unspecified SLE type, unspecified organ involvement status  -     predniSONE (DELTASONE) 5 MG tablet; Take 2 tablets (10 mg total) by mouth once daily.  -     hydroxychloroquine (PLAQUENIL) 200 mg tablet; Take 1 tablet (200 mg total) by mouth 2 (two) times daily.  -     dexamethasone injection 4 mg; Inject 1 mL (4 mg total) into the muscle one time.  -     BISI; Future  -     CBC auto differential; Future  -     Comprehensive metabolic panel; Future  -     C-reactive protein; Future  -     Sedimentation rate; Future  -     C3 complement; Future  -     C4 complement; Future  -     Complement, total; Future  -     Urinalysis; Future    Chronic pain of both knees  -     predniSONE (DELTASONE) 5 MG tablet; Take 2 tablets (10 mg total) by mouth once daily.  -     hydroxychloroquine (PLAQUENIL) 200 mg tablet; Take 1 tablet (200 mg total) by mouth 2 (two) times daily.  -     dexamethasone injection 4 mg; Inject 1 mL (4 mg total) into the muscle one time.  -     BISI; Future  -     CBC auto differential; Future  -     Comprehensive metabolic panel; Future  -     C-reactive protein; Future  -     Sedimentation rate; Future  -     C3 complement; Future  -     C4 complement; Future  -     Complement, total; Future  -     Urinalysis; Future    doing well, no change in tx prn prednisone

## 2018-07-18 NOTE — PROGRESS NOTES
Administered 1 cc dexamethasone 4mg/cc  to right gluteal. Pt tolerated well. No acute reaction noted to site. Pt instructed on S/S to report. Pt verbalized understanding.     Lot: [id number]  Exp:03/2020

## 2018-07-23 ENCOUNTER — TELEPHONE (OUTPATIENT)
Dept: FAMILY MEDICINE | Facility: CLINIC | Age: 28
End: 2018-07-23

## 2018-07-23 NOTE — TELEPHONE ENCOUNTER
Spoke to pt, advised that labs are still pending to be completed. She stated that she should be in this week to have them completed.

## 2018-07-23 NOTE — TELEPHONE ENCOUNTER
----- Message from Jann Leyva sent at 7/23/2018  2:35 PM CDT -----  Type: Needs Medical Advice    Who Called:  Patient    Best Call Back Number: [phone]  Additional Information: Patient calling.  Patient states that she was scheduled for blood work was out of town and unsure of date and time.  Please call to advise

## 2018-07-24 ENCOUNTER — LAB VISIT (OUTPATIENT)
Dept: LAB | Facility: HOSPITAL | Age: 28
End: 2018-07-24
Attending: PHYSICIAN ASSISTANT
Payer: COMMERCIAL

## 2018-07-24 DIAGNOSIS — D64.9 ANEMIA, UNSPECIFIED TYPE: ICD-10-CM

## 2018-07-24 LAB
FERRITIN SERPL-MCNC: 5 NG/ML
FOLATE SERPL-MCNC: 8 NG/ML
IRON SERPL-MCNC: 52 UG/DL
RETICS/RBC NFR AUTO: 0.8 %
SATURATED IRON: 10 %
TOTAL IRON BINDING CAPACITY: 517 UG/DL
TRANSFERRIN SERPL-MCNC: 349 MG/DL
VIT B12 SERPL-MCNC: 464 PG/ML

## 2018-07-24 PROCEDURE — 83540 ASSAY OF IRON: CPT

## 2018-07-24 PROCEDURE — 36415 COLL VENOUS BLD VENIPUNCTURE: CPT | Mod: PO

## 2018-07-24 PROCEDURE — 82607 VITAMIN B-12: CPT

## 2018-07-24 PROCEDURE — 82746 ASSAY OF FOLIC ACID SERUM: CPT

## 2018-07-24 PROCEDURE — 82728 ASSAY OF FERRITIN: CPT

## 2018-07-24 PROCEDURE — 85045 AUTOMATED RETICULOCYTE COUNT: CPT

## 2018-08-20 ENCOUNTER — OFFICE VISIT (OUTPATIENT)
Dept: PRIMARY CARE CLINIC | Facility: CLINIC | Age: 28
End: 2018-08-20
Payer: COMMERCIAL

## 2018-08-20 ENCOUNTER — PATIENT MESSAGE (OUTPATIENT)
Dept: PRIMARY CARE CLINIC | Facility: CLINIC | Age: 28
End: 2018-08-20

## 2018-08-20 VITALS
DIASTOLIC BLOOD PRESSURE: 62 MMHG | OXYGEN SATURATION: 98 % | HEIGHT: 62 IN | HEART RATE: 96 BPM | BODY MASS INDEX: 26.49 KG/M2 | WEIGHT: 143.94 LBS | TEMPERATURE: 98 F | SYSTOLIC BLOOD PRESSURE: 110 MMHG

## 2018-08-20 DIAGNOSIS — J00 COMMON COLD: Primary | ICD-10-CM

## 2018-08-20 DIAGNOSIS — R05.9 COUGH: ICD-10-CM

## 2018-08-20 PROCEDURE — 99214 OFFICE O/P EST MOD 30 MIN: CPT | Mod: S$GLB,,, | Performed by: NURSE PRACTITIONER

## 2018-08-20 PROCEDURE — 3008F BODY MASS INDEX DOCD: CPT | Mod: CPTII,S$GLB,, | Performed by: NURSE PRACTITIONER

## 2018-08-20 PROCEDURE — 99999 PR PBB SHADOW E&M-EST. PATIENT-LVL IV: CPT | Mod: PBBFAC,,, | Performed by: NURSE PRACTITIONER

## 2018-08-20 RX ORDER — FLUTICASONE PROPIONATE 50 MCG
1 SPRAY, SUSPENSION (ML) NASAL DAILY
Qty: 16 G | Refills: 2 | Status: SHIPPED | OUTPATIENT
Start: 2018-08-20 | End: 2018-10-11

## 2018-08-20 RX ORDER — BENZONATATE 200 MG/1
200 CAPSULE ORAL 3 TIMES DAILY PRN
Qty: 30 CAPSULE | Refills: 0 | Status: SHIPPED | OUTPATIENT
Start: 2018-08-20 | End: 2018-08-20

## 2018-08-20 RX ORDER — BENZONATATE 200 MG/1
200 CAPSULE ORAL 3 TIMES DAILY PRN
Qty: 30 CAPSULE | Refills: 0 | Status: SHIPPED | OUTPATIENT
Start: 2018-08-20 | End: 2018-08-30

## 2018-08-20 NOTE — PROGRESS NOTES
Jen Lazar is a 28 y.o. female patient of FADUMO Gale MD who presents to the clinic today for   Chief Complaint   Patient presents with    Cough     short of breath     Nasal Congestion   .    HPI    Pt, who is known to me, reports a new problem to me: cough, congestion, short of breath (ex when walking while carrying children or bathing them, for example), ears hurting, gland area hurting.  Temp up to 100..  Had ST yesterday.  Now sore low in the throat, making her cough .    These symptoms began 1 day ago and status is worse.     Symptoms are + acute.    Pt denies the following symptoms:  High fever    Aggravating factors include being active.    Relieving factors include nothing .    OTC Medications tried are none.    Prescription medications taken for symptoms are plaquenil and prednisone for lupus.    Pertinent medical history:  5 yr and 2 mo old children are sick.  Takes prednisone 5 mg as needed for the lupus, took 1 tablet yesterday for joint aches.  Takes plaquenil regularly.    Smoking status:  nonsmoker    ROS    Constitutional:   Low grade  fever, + fatigue, decrease in appetite.    Head:   No headache  Ears:   + pain.  Eyes:  Watery  Nose:   No sinus pain, + congestion, + runny nose, + post nasal drip.  Throat:  + ST pain.    Heart:  No new sxs.    Lungs:  + difficulty breathing, + coughing, occ sputum production, no wheezing.              Symptoms are community acquired.    GI/:  No sxs except inc freq    MS:  No new bone, joint or muscle problems.  Feels a little more joint achiness than usual..    Skin:  No rashes, itching.      PAST MEDICAL HISTORY:  Past Medical History:   Diagnosis Date    Anemia     ASD (atrial septal defect)     s/p device occlusion 12/2016    Bundle branch block, right     Fibromyalgia     pt denies    Ganglion cyst     Headache(784.0)     IBS (irritable bowel syndrome)     Kidney stone     PONV (postoperative nausea and vomiting)     Sinus  tachycardia     SLE (systemic lupus erythematosus related syndrome)     Syncope and collapse        PAST SURGICAL HISTORY:  Past Surgical History:   Procedure Laterality Date    ASD REPAIR  12/2016    BREAST SURGERY      breast augmentation    GANGLION CYST EXCISION Left     KNEE ARTHROSCOPY Right        SOCIAL HISTORY:  Social History     Socioeconomic History    Marital status:      Spouse name: Not on file    Number of children: 1    Years of education: Not on file    Highest education level: Not on file   Social Needs    Financial resource strain: Not on file    Food insecurity - worry: Not on file    Food insecurity - inability: Not on file    Transportation needs - medical: Not on file    Transportation needs - non-medical: Not on file   Occupational History    Occupation: Housewife   Tobacco Use    Smoking status: Never Smoker    Smokeless tobacco: Never Used   Substance and Sexual Activity    Alcohol use: No    Drug use: No    Sexual activity: Yes     Partners: Male   Other Topics Concern    Not on file   Social History Narrative    Not on file       FAMILY HISTORY:  Family History   Adopted: Yes   Problem Relation Age of Onset    Anemia Mother     Fainting Mother     Hypertension Father     Arrhythmia Father     Congenital heart disease Daughter         pda    No Known Problems Maternal Grandmother     No Known Problems Maternal Grandfather     No Known Problems Paternal Grandmother     No Known Problems Paternal Grandfather     No Known Problems Maternal Aunt     No Known Problems Maternal Uncle     No Known Problems Paternal Aunt     No Known Problems Paternal Uncle     Asthma Neg Hx     Clotting disorder Neg Hx     Heart attack Neg Hx     Heart disease Neg Hx     Heart failure Neg Hx     Hyperlipidemia Neg Hx     Stroke Neg Hx     Atrial Septal Defect Neg Hx     Pacemaker/defibrilator Neg Hx     Early death Neg Hx        ALLERGIES AND MEDICATIONS:  updated and reviewed.  Review of patient's allergies indicates:   Allergen Reactions    Codeine Nausea And Vomiting    Vicodin [hydrocodone-acetaminophen] Nausea And Vomiting     Current Outpatient Medications   Medication Sig Dispense Refill    acetaminophen (TYLENOL) 325 MG tablet Take 325 mg by mouth every 6 (six) hours as needed for Pain.      hydroxychloroquine (PLAQUENIL) 200 mg tablet Take 1 tablet (200 mg total) by mouth 2 (two) times daily. 60 tablet 7    levonorgestrel (MIRENA) 20 mcg/24 hr (5 years) IUD Mirena 20 mcg/24 hr (5 years) intrauterine device   Take by intrauterine route.      azelastine (ASTELIN) 137 mcg (0.1 %) nasal spray 1 spray (137 mcg total) by Nasal route 2 (two) times daily. 30 mL 0    predniSONE (DELTASONE) 5 MG tablet prednisone 5 mg tablet       No current facility-administered medications for this visit.              PHYSICAL EXAM    Alert, coop 28 y.o. female patient in no acute distress.    Vitals:    08/20/18 1312   BP: 110/62   Pulse: 96   Temp: 98 °F (36.7 °C)     VS reviewed.  VS stable.  CC, nursing note, medications & PMH all reviewed today.    Head:  Normocephalic, atraumatic.    EENT:  EACs patent.  TMs no erythema, diffuse LR, no effusions, no TM perforation.                              Eye lids normal, no discharge present.       Sinus tenderness to palp--none.               Nares--+ edema, + d/c present.    Pharynx not injected.                Tonsils not erythematous , not enlarged, no exudate present.    Small bilat NT anterior, no posterior cervical lymph nodes palpable.    No submental, submandibular or supraclavicular lymph nodes palp.             Resp:  Respirations even, unlabored   Lungs CTA bilat.  No wheezing.  No crackles.  Moves air to bases bilat.    Heart:  RRR.    MS:  Ambulates normall.             NEURO:  Alert and oriented x 4.  Responds appropriately during interaction.    Skin:  Warm, dry, color good.    Common cold    Cough  -      Discontinue: benzonatate (TESSALON) 200 MG capsule; Take 1 capsule (200 mg total) by mouth 3 (three) times daily as needed.  Dispense: 30 capsule; Refill: 0  -     benzonatate (TESSALON) 200 MG capsule; Take 1 capsule (200 mg total) by mouth 3 (three) times daily as needed.  Dispense: 30 capsule; Refill: 0      Pt today presents with 2nd day of cold sxs (cough, congestion, runny nose, drip).  Exam consistent with cold.    This is a new problem to me.  No work up is planned.       Pt advised to perform comfort measures recommended on patient instruction sheet .    RTC prn  Explained exam findings, diagnosis and treatment plan to patient.  Questions answered and patient states understanding.

## 2018-08-20 NOTE — PATIENT INSTRUCTIONS
When Your Child Has a Cold or Flu  Colds and influenza (flu) infect the upper respiratory tract. This includes the mouth, nose, nasal passages, and throat. Both illnesses are caused by germs called viruses, and both share some of the same symptoms. But colds and flu differ in a few key ways. Knowing more about these infections may make it easier to prevent them. And if your child does get sick, you can help keep symptoms from becoming worse.    What is a cold?  · Symptoms include runny nose, cough, sneezing, and sore throat. Cold symptoms tend to be milder than flu symptoms.  · Cold symptoms come on slowly.  · Children with a cold can still do most of their usual activities.  What is the flu?  · Influenza is a respiratory infection. (Its not the same as the stomach flu.)  · Symptoms include fever, headache, tiredness, cough, sore throat, runny nose, and muscle aches. Children may also have an upset stomach and vomiting.  · Flu symptoms tend to come on quickly.  · Children with the flu may feel too worn out to do their normal activities.  How do colds and flu spread?  The viruses that cause colds and flu spread in droplets when someone who is sick coughs or sneezes. Children can inhale the germs directly. But they can also  the virus by touching a surface where droplets have landed. Germs then enter a childs body when she touches her eyes, nose, or mouth.  Why do children get colds and flu?  Children get more colds and flu than adults do. Here are some reasons why:  · Less resistance. A childs immune system is not as strong as an adults when it comes to fighting cold and flu germs.  · Winter season. Most respiratory illnesses occur in fall and winter when children are indoors and exposed to more germs.  · School or . Colds and flu spread easily when children are in close contact.  · Hand-to-mouth contact. Children are likely to touch their eyes, nose, or mouth without washing their hands. This is  the most common way germs spread.  How are colds and flu diagnosed?  Most often, healthcare providers diagnose a cold or the flu based on the childs symptoms and a physical exam. Children may also have throat or nasal swabs to check for bacteria and viruses. Your childs provider may do other tests, depending on your childs symptoms and overall health. These tests may include:  · Complete blood count (CBC). This blood test looks for signs of infection.  · Chest X-ray. This is done to make sure your child does not have pneumonia.  How are colds and flu treated?  Most children recover from colds and flu on their own. Antibiotics arent effective against viral infections, so they are not prescribed. Instead, treatment is focused on helping ease your childs symptoms until the illness passes. To help your child feel better:  · Give your child lots of fluids, such as water, electrolyte solutions, apple juice, and warm soup, to prevent fluid loss (dehydration).  · Make sure your child gets plenty of rest.  · Have older children gargle with warm saltwater.  · To relieve nasal congestion, try saline nasal sprays. You can buy them without a prescription, and theyre safe for children. These are not the same as nasal decongestant sprays, which may make symptoms worse.  · Use childrens strength medicine for symptoms. Discuss all over-the-counter (OTC) products with your childs provider before using them. Note: Dont give OTC cough and cold medicines to a child younger than 6 years old unless the provider tells you to do so.  · Never give aspirin to a child under age 18 who has a cold or flu. (It could cause a rare but serious condition called Reye syndrome.)  · Never give ibuprofen to an infant age 6 months or younger.  · Keep your child home until he or she has been fever-free for 24 hours.  · If your child is diagnosed with the flu, he or she may be given antiviral treatments that can reduce symptoms and shorten the  length of illness. These treatments work best if they are started soon after your child shows symptoms.  Preventing colds and flu  To help children stay healthy:  · Teach children to wash their hands often--before eating and after using the bathroom, playing with animals, or coughing or sneezing. Carry an alcohol-based hand gel (containing at least 60% alcohol) for times when soap and water arent available.  · Remind children not to touch their eyes, nose, and mouth.  · Ask your childs healthcare provider about a flu vaccination for your child. Vaccination is recommended for all children age 6 months and older. The vaccination is given in the form of a shot. A nasal spray made of live but weakened flu virus is also available but is not recommended for the 0779-3483 flu season. The CDC says this is because the nasal spray did not seem to protect against the flu over the last several flu seasons. In the past, it was meant for children ages 2 and older.  Tips for proper handwashing  Use warm water and plenty of soap. Work up a good lather.  · Clean the whole hand, under the nails, between the fingers, and up the wrists.  · Wash for at least 15 to 20 seconds (as long as it takes to say the alphabet or sing the Happy Birthday song). Dont just wipe--scrub well.  · Rinse well. Let the water run down the fingers, not up the wrists.  · In a public restroom, use a paper towel to turn off the faucet and open the door.  When to call your childs healthcare provider  Call your childs provider if your child doesnt get better or has:  · Shortness of breath or fast breathing  · Thick yellow or green mucus that comes up with coughing  · Worsening symptoms, especially after a period of improvement  · Fever, as directed by your childs healthcare provider, or:  ¨ Your child is younger than 12 weeks and has a fever of 100.4°F (38°C) or higher  ¨ Your child has repeated fevers above 104°F (40°C) at any age  ¨ Your child is younger  than 2 years old and the fever lasts for more than 24 hours  ¨ Your child is 2 years old or older and the fever lasts for more than 3 days  ¨ Your child has a seizure caused by the fever  ¨ Fever with a rash, or fever that doesnt go down with medicine  · Severe or continued vomiting  · Signs of dehydration (such as a dry mouth, dark or strong-smelling urine or no urine output in 6 to 8 hours, and refusal to drink fluids)  · Trouble waking up  · Ear pain (in toddlers or teens)  · Sinus pain or pressure   Date Last Reviewed: 1/1/2017 © 2000-2017 [employer]. [address] [place] [address]. All rights reserved. This information is not intended as a substitute for professional medical care. Always follow your healthcare professional's instructions.    Use the flonase.  Benzonatate cough capsules as needed.    If you have any questions, please call.  You can reach us at [phone] Monday through Thursday (except holidays) 10 a.m. to 6 p.m. or call Dr. Gale/ISMAEL Avila    Thank you for using the [facility]!    ION Johns, CNP, FNP-BC  [facility]    To rate your experience with JONO Johns, click on the link below:        [email or web address]

## 2018-08-20 NOTE — Clinical Note
FADUMO Gale MD,  I saw your patient today in the [facility].  If you have any questions, please do not hesitate to contact me.  Thank you!  JONO Johns

## 2018-08-28 ENCOUNTER — TELEPHONE (OUTPATIENT)
Dept: RHEUMATOLOGY | Facility: CLINIC | Age: 28
End: 2018-08-28

## 2018-08-28 RX ORDER — METHYLPREDNISOLONE 4 MG/1
TABLET ORAL
Qty: 1 PACKAGE | Refills: 0 | Status: SHIPPED | OUTPATIENT
Start: 2018-08-28 | End: 2018-09-18

## 2018-08-28 RX ORDER — AZITHROMYCIN 1 G/1
1 POWDER, FOR SUSPENSION ORAL ONCE
Qty: 1 PACKET | Refills: 0 | Status: SHIPPED | OUTPATIENT
Start: 2018-08-28 | End: 2018-08-28

## 2018-08-28 NOTE — TELEPHONE ENCOUNTER
Spoke to pt, she reports her joints just hurt throughout the day now. She has been on plaquenil for 1 year 1 tablet twice daily. Take prednisone as need, taking more lately due to increased pain in hips, right hand, elbow, fingers will swell with knuckles. Hurts to move neck. Dr. Mims spoke directly to pt, advised d/t recent virus she is flaring. Dr. Mims advises she can come here for a shot or do Prednisone 20mg for 2 days, then 10mg for 2 days, then 5mg 2 days. Pt is still coughing, Dr. Mims will send Z-shamir to cover anything bacterial and medrol dose shamir for Lupus flare. Diflucan to cover yeast infection.

## 2018-08-28 NOTE — TELEPHONE ENCOUNTER
----- Message from Donna Dumas sent at 8/28/2018  1:48 PM CDT -----    Type:  Pharmacy Calling to Clarify an RX    Name of Caller: pt  Pharmacy Name:    [facility] - [place] [address]  [place] [id number]  Phone: [phone] Fax: [phone]    Prescription Name:  Plaquenil 200   And   Wants to  increase What do they need to clarify?:    Best Call Back Number: [phone]

## 2018-10-11 ENCOUNTER — OFFICE VISIT (OUTPATIENT)
Dept: FAMILY MEDICINE | Facility: CLINIC | Age: 28
End: 2018-10-11
Payer: COMMERCIAL

## 2018-10-11 VITALS
SYSTOLIC BLOOD PRESSURE: 92 MMHG | BODY MASS INDEX: 26.49 KG/M2 | HEIGHT: 62 IN | DIASTOLIC BLOOD PRESSURE: 70 MMHG | WEIGHT: 143.94 LBS | OXYGEN SATURATION: 99 % | TEMPERATURE: 98 F | HEART RATE: 74 BPM

## 2018-10-11 DIAGNOSIS — F41.9 ANXIETY: Primary | ICD-10-CM

## 2018-10-11 DIAGNOSIS — F90.2 ADHD (ATTENTION DEFICIT HYPERACTIVITY DISORDER), COMBINED TYPE: ICD-10-CM

## 2018-10-11 PROBLEM — O47.9 IRREGULAR CONTRACTIONS: Status: RESOLVED | Noted: 2018-02-27 | Resolved: 2018-10-11

## 2018-10-11 PROBLEM — J06.9 URI (UPPER RESPIRATORY INFECTION): Status: RESOLVED | Noted: 2018-07-06 | Resolved: 2018-10-11

## 2018-10-11 PROBLEM — Z34.90 TERM PREGNANCY: Status: RESOLVED | Noted: 2018-03-06 | Resolved: 2018-10-11

## 2018-10-11 PROCEDURE — 99999 PR PBB SHADOW E&M-EST. PATIENT-LVL III: CPT | Mod: PBBFAC,,, | Performed by: NURSE PRACTITIONER

## 2018-10-11 PROCEDURE — 3008F BODY MASS INDEX DOCD: CPT | Mod: CPTII,S$GLB,, | Performed by: NURSE PRACTITIONER

## 2018-10-11 PROCEDURE — 99213 OFFICE O/P EST LOW 20 MIN: CPT | Mod: S$GLB,,, | Performed by: NURSE PRACTITIONER

## 2018-10-11 RX ORDER — FLUOXETINE HYDROCHLORIDE 20 MG/1
CAPSULE ORAL
COMMUNITY
End: 2018-12-13

## 2018-10-11 NOTE — PROGRESS NOTES
Subjective:       Patient ID: Jen Lazar is a 28 y.o. female.    Chief Complaint: Medication Refill  She was last seen in primary care by JAMES Hi.  HPI   She is here requesting Adderall  Vitals:    10/11/18 1337   BP: 92/70   Pulse: 74   Temp: 97.9 °F (36.6 °C)     Review of Systems    She is here today requesting to go back on Adderrall. States she has been off since July 2017 due to pregnancy.  She states had been on medication since 2007.  ? diagnosed by Tomas Regan, psychologist? States had testing process and had to go to her PCP to have medication prescribed.  Was receiving per Dr. Grant prior  I reviewed prior notes and see 10 mg adderall prescribed bid er Dr. Grant om 02/03/2017.   States does not work or go to school at this time but states plans to return in January.   States it helps me with my anxiety and to get things done. States feels like it helps her to settle down.  She has not start the Prozac that was ordered by GYN.   She no history of inpatient psych treatment and no prior suicidal attempts.  Has had heart defects surgically repaired in 2016  Objective:      Physical Exam   Constitutional: She is oriented to person, place, and time. She appears well-developed and well-nourished.   HENT:   Head: Normocephalic and atraumatic.   Right Ear: External ear normal.   Left Ear: External ear normal.   Nose: Nose normal.   Mouth/Throat: Oropharynx is clear and moist.   Neck: Trachea normal, normal range of motion and full passive range of motion without pain. Neck supple.   Cardiovascular: Normal rate, regular rhythm and normal heart sounds.   Pulmonary/Chest: Effort normal and breath sounds normal.   Abdominal: Soft. Bowel sounds are normal.   Musculoskeletal: Normal range of motion.   Lymphadenopathy:        Head (right side): No submental, no submandibular, no tonsillar, no preauricular, no posterior auricular and no occipital adenopathy present.        Head (left side): No  submental, no submandibular, no tonsillar, no preauricular, no posterior auricular and no occipital adenopathy present.     She has no cervical adenopathy.   Neurological: She is alert and oriented to person, place, and time.   Skin: Skin is warm, dry and intact.   Psychiatric: She has a normal mood and affect. Her speech is normal and behavior is normal. Judgment and thought content normal. Cognition and memory are normal.   Nursing note and vitals reviewed.      Assessment & Plan:       Anxiety    ADHD (attention deficit hyperactivity disorder), combined type    I have discussed with patient that I cannot prescribe Adderall. She states was instructed to come and see me and Baljinder would be notified of desire.      Medication List           Accurate as of 10/11/18 11:59 PM. If you have any questions, ask your nurse or doctor.               CONTINUE taking these medications    acetaminophen 325 MG tablet  Commonly known as:  TYLENOL     FLUoxetine 20 MG capsule  Commonly known as:  PROZAC     hydroxychloroquine 200 mg tablet  Commonly known as:  PLAQUENIL  Take 1 tablet (200 mg total) by mouth 2 (two) times daily.     MIRENA 20 mcg/24 hr (5 years) IUD  Generic drug:  levonorgestrel     predniSONE 5 MG tablet  Commonly known as:  DELTASONE        STOP taking these medications    azelastine 137 mcg (0.1 %) nasal spray  Commonly known as:  ASTELIN  Stopped by:  Kenisha Avila DNP, APRN     fluticasone 50 mcg/actuation nasal spray  Commonly known as:  FLONASE  Stopped by:  Kenisha Avila DNP, APRN              No Follow-up on file.

## 2018-10-12 PROBLEM — F90.2 ADHD (ATTENTION DEFICIT HYPERACTIVITY DISORDER), COMBINED TYPE: Status: ACTIVE | Noted: 2018-10-12

## 2018-10-12 PROBLEM — F41.9 ANXIETY: Status: ACTIVE | Noted: 2018-10-12

## 2018-11-14 ENCOUNTER — OFFICE VISIT (OUTPATIENT)
Dept: PRIMARY CARE CLINIC | Facility: CLINIC | Age: 28
End: 2018-11-14
Payer: COMMERCIAL

## 2018-11-14 ENCOUNTER — LAB VISIT (OUTPATIENT)
Dept: LAB | Facility: HOSPITAL | Age: 28
End: 2018-11-14
Attending: NURSE PRACTITIONER
Payer: COMMERCIAL

## 2018-11-14 VITALS
HEIGHT: 62 IN | HEART RATE: 80 BPM | DIASTOLIC BLOOD PRESSURE: 72 MMHG | SYSTOLIC BLOOD PRESSURE: 106 MMHG | BODY MASS INDEX: 25.8 KG/M2 | OXYGEN SATURATION: 97 % | TEMPERATURE: 98 F | WEIGHT: 140.19 LBS

## 2018-11-14 DIAGNOSIS — R20.2 PARESTHESIA OF BOTH HANDS: Primary | ICD-10-CM

## 2018-11-14 DIAGNOSIS — M62.838 MUSCLE SPASMS OF NECK: ICD-10-CM

## 2018-11-14 DIAGNOSIS — R42 DIZZINESS: ICD-10-CM

## 2018-11-14 DIAGNOSIS — R20.2 PARESTHESIA OF BOTH HANDS: ICD-10-CM

## 2018-11-14 DIAGNOSIS — M62.830 MUSCLE SPASM OF BACK: ICD-10-CM

## 2018-11-14 LAB
BASOPHILS # BLD AUTO: 0.08 K/UL
BASOPHILS NFR BLD: 0.8 %
DIFFERENTIAL METHOD: ABNORMAL
EOSINOPHIL # BLD AUTO: 0.1 K/UL
EOSINOPHIL NFR BLD: 1.2 %
ERYTHROCYTE [DISTWIDTH] IN BLOOD BY AUTOMATED COUNT: 14 %
HCT VFR BLD AUTO: 39.3 %
HGB BLD-MCNC: 11.9 G/DL
IMM GRANULOCYTES # BLD AUTO: 0.04 K/UL
IMM GRANULOCYTES NFR BLD AUTO: 0.4 %
LYMPHOCYTES # BLD AUTO: 1.4 K/UL
LYMPHOCYTES NFR BLD: 14.3 %
MCH RBC QN AUTO: 26.4 PG
MCHC RBC AUTO-ENTMCNC: 30.3 G/DL
MCV RBC AUTO: 87 FL
MONOCYTES # BLD AUTO: 0.6 K/UL
MONOCYTES NFR BLD: 6.7 %
NEUTROPHILS # BLD AUTO: 7.2 K/UL
NEUTROPHILS NFR BLD: 76.6 %
NRBC BLD-RTO: 0 /100 WBC
PLATELET # BLD AUTO: 243 K/UL
PMV BLD AUTO: 11.6 FL
RBC # BLD AUTO: 4.5 M/UL
WBC # BLD AUTO: 9.42 K/UL

## 2018-11-14 PROCEDURE — 85025 COMPLETE CBC W/AUTO DIFF WBC: CPT

## 2018-11-14 PROCEDURE — 3008F BODY MASS INDEX DOCD: CPT | Mod: CPTII,S$GLB,, | Performed by: NURSE PRACTITIONER

## 2018-11-14 PROCEDURE — 36415 COLL VENOUS BLD VENIPUNCTURE: CPT | Mod: PO

## 2018-11-14 PROCEDURE — 99999 PR PBB SHADOW E&M-EST. PATIENT-LVL IV: CPT | Mod: PBBFAC,,, | Performed by: NURSE PRACTITIONER

## 2018-11-14 PROCEDURE — 99215 OFFICE O/P EST HI 40 MIN: CPT | Mod: S$GLB,,, | Performed by: NURSE PRACTITIONER

## 2018-11-14 RX ORDER — FLUTICASONE PROPIONATE 50 MCG
1 SPRAY, SUSPENSION (ML) NASAL DAILY
Qty: 16 G | Refills: 0 | Status: SHIPPED | OUTPATIENT
Start: 2018-11-14 | End: 2019-12-27

## 2018-11-14 RX ORDER — CYCLOBENZAPRINE HCL 5 MG
5 TABLET ORAL NIGHTLY PRN
Qty: 20 TABLET | Refills: 0 | Status: SHIPPED | OUTPATIENT
Start: 2018-11-14 | End: 2018-11-24

## 2018-11-14 NOTE — PROGRESS NOTES
"Jen Lazar is a 28 y.o. female patient of Dr. FADUMO Gale MD who presents to the clinic today for   Chief Complaint   Patient presents with    Sore Throat    Dizziness    Neck Pain     tightness     Extremity Weakness     right arm    .    HPI      Pt, who is known to me, reports a new problem to me:  "feel really weird".  Having midline throat pain--awakening her at night, now just scratchy.  No side of the neck pain.  Today she awoke and was "really dizzy".  Right base of the neck is very tight, pushing her head forward, can't get comfortable, right arm feels weak.  Turning the head makes the dizziness "way worse".  Has had neck stiffnes in the past but this is different.  When she's been ill in the past she's had some neck stiffness but this is different.  With that, she had to turn her trunk to look to the side but in this case she can turn the neck.  Right arm feelsweak--was dropping things out of her hand, feels "weird", "strange", "feels light", tingling in her fingers 4-5, like the fingers fell asleep.  Not sure if all the sxs are related.  Wouldn't have come to be seen if she only had only the ST.    No one has given her an explanation for the dizziness.    These symptoms began 2 days ago ST started and today the dizziness started.  Noted the dizziness when she awoke, before she got out of bed.  Was able to get her daughter off to school, kept dropping things.  Noted increase when she turned her head.  ST is better, more scratchy, some post nasal drip causing coughing.     Eats regularly--breakfast, lunch, dinner.  Doesn't "drink enough" fluids.  Is trying to drink more.    Pt denies the following symptoms:  Nasal sxs, blowing her noses, productive coughing, fever.    Aggravating factors include turning head, bending over .    Relieving factors include nothing .    OTC Medications tried are Tylenol.    Prescription medications taken for symptoms are none.    Pertinent medical history:  " Had dizziness in July, as well.  Had no cause determined.    Hgb was 7.5 in March 2018 when her daughter was born.  H/o TMJ--dentist has told her about it.  She feels tightness in her jaw.  Cyclobenzaprine has helped in the past.    ROS    Constitutional:  No fever, + fatigue.  Sleeps 7 hrs per night, not feeling rested when she awakens.  Still awakens at night to feed her baby.    HEENT:  No headache. No ear pain.  Post nasal drip.  Scratchy throat.    Heart/Lung:  No heart palpitations or chest pain.  No difficulty breathing or coughing.    GI/:  No new sxs.    MS:  See Chief Complaint/HPI.  Feels like the muscles in her face, neck and arm are tight, like she can't relax them.    Skin:  No rashes, itching..      PAST MEDICAL HISTORY:  Past Medical History:   Diagnosis Date    Anemia     ASD (atrial septal defect)     s/p device occlusion 12/2016    Bundle branch block, right     Fibromyalgia     pt denies    Ganglion cyst     Headache(784.0)     IBS (irritable bowel syndrome)     Kidney stone     PONV (postoperative nausea and vomiting)     Sinus tachycardia     SLE (systemic lupus erythematosus related syndrome)     Syncope and collapse        PAST SURGICAL HISTORY:  Past Surgical History:   Procedure Laterality Date    ARTHROSCOPY, KNEE Right 11/19/2013    [facility]    ASD REPAIR  12/2016    BREAST SURGERY      breast augmentation    CHONDRECTOMY, KNEE, SEMILUNAR CARTILAGE Right 11/19/2013    [facility] at [facility]    GANGLION CYST EXCISION Left     KNEE ARTHROSCOPY Right     LITHOTRIPSY-EXTRACORPOREAL SHOCK WAVE Right 8/11/2015    [facility]    REPAIR-ATRIAL SEPTAL DEFECT N/A 12/22/2016    [facility] CATH LAB       SOCIAL HISTORY:  Social History     Socioeconomic History    Marital status:      Spouse name: Not on file    Number of children: 1    Years  of education: Not on file    Highest education level: Not on file   Social Needs    Financial resource strain: Not on file    Food insecurity - worry: Not on file    Food insecurity - inability: Not on file    Transportation needs - medical: Not on file    Transportation needs - non-medical: Not on file   Occupational History    Occupation: Housewife   Tobacco Use    Smoking status: Never Smoker    Smokeless tobacco: Never Used   Substance and Sexual Activity    Alcohol use: No    Drug use: No    Sexual activity: Yes     Partners: Male   Other Topics Concern    Not on file   Social History Narrative    Not on file       FAMILY HISTORY:  Family History   Adopted: Yes   Problem Relation Age of Onset    Anemia Mother     Fainting Mother     Hypertension Father     Arrhythmia Father     Congenital heart disease Daughter         pda    No Known Problems Maternal Grandmother     No Known Problems Maternal Grandfather     No Known Problems Paternal Grandmother     No Known Problems Paternal Grandfather     No Known Problems Maternal Aunt     No Known Problems Maternal Uncle     No Known Problems Paternal Aunt     No Known Problems Paternal Uncle     Asthma Neg Hx     Clotting disorder Neg Hx     Heart attack Neg Hx     Heart disease Neg Hx     Heart failure Neg Hx     Hyperlipidemia Neg Hx     Stroke Neg Hx     Atrial Septal Defect Neg Hx     Pacemaker/defibrilator Neg Hx     Early death Neg Hx        ALLERGIES AND MEDICATIONS: updated and reviewed.  Review of patient's allergies indicates:   Allergen Reactions    Codeine Nausea And Vomiting    Vicodin [hydrocodone-acetaminophen] Nausea And Vomiting     Current Outpatient Medications   Medication Sig Dispense Refill    acetaminophen (TYLENOL) 325 MG tablet Take 325 mg by mouth every 6 (six) hours as needed for Pain.      FLUoxetine (PROZAC) 20 MG capsule fluoxetine 20 mg capsule   Take 1 capsule every day by oral route.       hydroxychloroquine (PLAQUENIL) 200 mg tablet Take 1 tablet (200 mg total) by mouth 2 (two) times daily. 60 tablet 7    levonorgestrel (MIRENA) 20 mcg/24 hr (5 years) IUD Mirena 20 mcg/24 hr (5 years) intrauterine device   Take by intrauterine route.      predniSONE (DELTASONE) 5 MG tablet prednisone 5 mg tablet       No current facility-administered medications for this visit.          PHYSICAL EXAM    Alert, coop 28 y.o. female patient in no acute distress.    Vitals:    11/14/18 0902   BP: 106/72   Pulse: 80   Temp: 97.8 °F (36.6 °C)     VS reviewed.  VS stable.  CC, nursing note, medications & PMH all reviewed today.    Head:  Normocephalic, atraumatic.    EENT:  Ext nose/ears normal.               TMs with diffuse cones of light bilat, no erythema, small bilat effusions               Pharynx not injected.  Tonsils not enlarged, no exudate.               Bilat small NT cervical lymphadenopathy noted.              No sinus tenderness to palp.               Eye lids normal, no discharge present.       Resp:  Respirations even, unlabored               Lungs CTA bilat.    Heart:  RRR  CV:  Cap RF brisk, radial pulses 3+ bilat.      MS:  The bilat UEs is/are noted to have no edema, no erythema, no ecchymosis.  The affected area is not tender to palp.  Pain is not elicited with exam .  Pain is not noted in the joint(s).  ROM of the affected area is full.  ROM of the UEs is symmetrical.   is reduced on the right.  The other joints and areas of the UE are without erythema, edema, ecchymosis or pain.    The right paraspinal muscles of the neck and upper back is/are noted to have no edema, no erythema, no ecchymosis.  The affected area is not tender to palp.  Pain elicited with palp of the tender spasms palp in the neck and muscle areas .  Muscle tension and soreness is noted .  No vertebrae are tender to palp.  ROM of the affected area is intact.  ROM of the uppers extremities is symmetrical.            NEURO:   Alert and oriented x 4.  Responds appropriately during interaction.                  Sensation to light touch intact over diminished over the right 4th/5th digits.                  Right shoulder shrug decreased on right (4/5)                  Facial movements symmetrical.                  Tingling of digits 4-5 with Phalen's test.    Skin:  Warm, dry, color good.    Psych:  Responds appropriately throughout the visit.               Flat affect, speaks with quiet voice.  Well-groomed.    Paresthesia of both hands  Comments:  fingers 4-5, weaker  on right  Orders:  -     Ambulatory referral to Physical Medicine Rehab    Muscle spasm of back  -     cyclobenzaprine (FLEXERIL) 5 MG tablet; Take 1 tablet (5 mg total) by mouth nightly as needed for Muscle spasms.  Dispense: 20 tablet; Refill: 0    Muscle spasms of neck  -     cyclobenzaprine (FLEXERIL) 5 MG tablet; Take 1 tablet (5 mg total) by mouth nightly as needed for Muscle spasms.  Dispense: 20 tablet; Refill: 0    Dizziness  Comments:  likely due to middle ear problem.  Orders:  -     CBC auto differential; Future; Expected date: 11/14/2018  -     fluticasone (FLONASE) 50 mcg/actuation nasal spray; 1 spray (50 mcg total) by Each Nare route once daily.  Dispense: 16 g; Refill: 0      Pt today presents with multiple concerns:  1) sore throat that has become scratchy and post nasal drip with no runny nose, coughing only with drip, improving.  On exam, pharynx not injected but there are some very small bilat ant cervical lymph nodes.  2) awoke with dizziness before she even got out of bed this morning.  Still with dizziness, worse with turning her head side to side.  H/o same, last episode in July.  On exam she has evidence of ETD.  3)  Muscle tension in her right neck and upper back and a feeling like there is tension in the muscles in her face.  She feels like she is unable to relax the neck and face muscles.  On exam she has palp areas of muscle tension in the  right paracervical muscles and in the right upper back muscles across the area of the trapezius.  ROM of the UEs and neck is intact.  4)  Tingling of her fingers 4-5 of the right hand and weakness of the hand, has been dropping things.  On exam, Phalen's test precipitates the tingling, there is decreased  with the right hand and decreased strength of shoulder shrug on the right.    This is a new problem to me and the following work up is planned.    Lab & Radiological Tests Ordered: CBC.  The results are pending.    I have reviewed the following additional tests today: CBC in March.    I talked with her new PCP and he will move up her new patient appointment to further address some of her health problems.  Pt advised to perform comfort measures recommended on patient instruction sheet .    If worse or concerns, the patient is advised to call us.  Explained exam findings, diagnosis and treatment plan to patient.  Questions answered and patient states understanding.    45 minutes was spent in face to face evaluation of the patient.  At least 50% of the time was spent in counseling on the following:  Physical exam findings and what they mean, ETD, causes of dizziness, ulnar nerve entrapment, muscle spasm treatment..

## 2018-11-14 NOTE — PATIENT INSTRUCTIONS
The ears seem to be causing the dizziness.  Use flonase daily and monitor.  We'll double check the blood to be sure it's ok.    For the tingling, see Dr. Duffy.    For the muscle spasm:  Flexeril tablet at bedtime for 3-4 nights then stop and reserve until needed in the future.  Hot showers, muscle rubs, massage can also be helpful.    I'm going to send info to Dr. Gale's staff to move up your appointment.    If you have worsening of symptoms or concerning changes, call.    If you have any questions, please call.  You can reach us at [phone] Monday through Thursday (except holidays) 10 a.m. to 6 p.m. or call Dr. Gale/ISMAEL Avila    Thank you for using the [facility]!    ION Johns, CNP, FNP-BC  [facility]    To rate your experience with JONO Johns, click on the link below:        [email or web address]

## 2018-11-21 ENCOUNTER — INITIAL CONSULT (OUTPATIENT)
Dept: PHYSICAL MEDICINE AND REHAB | Facility: CLINIC | Age: 28
End: 2018-11-21
Payer: COMMERCIAL

## 2018-11-21 VITALS
HEIGHT: 62 IN | WEIGHT: 140 LBS | DIASTOLIC BLOOD PRESSURE: 71 MMHG | SYSTOLIC BLOOD PRESSURE: 114 MMHG | HEART RATE: 83 BPM | BODY MASS INDEX: 25.76 KG/M2

## 2018-11-21 DIAGNOSIS — G56.20 ULNAR NEURITIS, UNSPECIFIED LATERALITY: Primary | ICD-10-CM

## 2018-11-21 PROCEDURE — 99243 OFF/OP CNSLTJ NEW/EST LOW 30: CPT | Mod: S$GLB,,, | Performed by: PHYSICAL MEDICINE & REHABILITATION

## 2018-11-21 PROCEDURE — 99999 PR PBB SHADOW E&M-EST. PATIENT-LVL III: CPT | Mod: PBBFAC,,, | Performed by: PHYSICAL MEDICINE & REHABILITATION

## 2018-11-21 NOTE — LETTER
November 21, 2018      Isatu Ritter NP  [address] [facility] [address] [place] [address]           [facility]  [address] [facility] [address] [place] [address]  Phone: [phone]  Fax: [phone]          Patient: Jen Lazar   MR Number: [id number]   YOB: 1990   Date of Visit: 11/21/2018       Dear Isatu Ritter:    Thank you for referring Jen Lazar to me for evaluation. Attached you will find relevant portions of my assessment and plan of care.    If you have questions, please do not hesitate to call me. I look forward to following Jen Lazar along with you.    Sincerely,    Russ Duffy MD    Enclosure  CC:  No Recipients    If you would like to receive this communication electronically, please contact [email or web address] or [phone] to request more information on [facility] access.    For providers and/or their staff who would like to refer a patient to [facility], please contact us through our one-stop-shop provider referral line, [facility], at [phone].    If you feel you have received this communication in error or would no longer like to receive these types of communications, please e-mail [email or web address]

## 2018-11-21 NOTE — PROGRESS NOTES
"[facility]    Consulting Provider: Isatu Ritter    CHIEF COMPLAINT: Parasethesias in her hand    HISTORY OF PRESENT ILLNESS: Jen Lazar is a 28 y.o. female w/hx of SLE who presents to me for the first time by referral from Isatu Ritter  for evaluation of parasthesia in in her R hand. She is having it in the 4th and 5th digit in bilateral hands on the palmar surface (R>L). She also reports "heaviness" in her elbows down to her fingers at times as well. She also reports a tingling sensation occasionally near the medial border of her L scapula.     She currently lives at home with her kids. She says that the tingling doesn't really wake her up from sleep, and that the symptoms are worse during the day. She is unable to recall any activities or motions that bring on the tingling. Denies any radicular symptoms. She does report that she was sick when she saw Isatu Ritter, and at that time was having episodes where she was dropping things, but she said that this     She has not tried any medications for this. She takes Flexeril for muscle spasms in her neck and face. She has not done any therapy for this issue. She says she does not have diabetes.     Review of Systems   Constitutional: Negative for fever.   HENT: Negative for drooling.    Eyes: Negative for discharge.   Respiratory: Negative for choking.    Cardiovascular: Negative for chest pain.   Genitourinary: Negative for flank pain.   Skin: Negative for wound.   Allergic/Immunologic: Negative for immunocompromised state.   Neurological: Negative for tremors and syncope.   Psychiatric/Behavioral: Negative for behavioral problems.     Past Medical History:   Past Medical History:   Diagnosis Date    Anemia     ASD (atrial septal defect)     s/p device occlusion 12/2016    Bundle branch block, right     Fibromyalgia     pt denies    Ganglion cyst     Headache(784.0)     IBS (irritable bowel syndrome)     Kidney stone     " PONV (postoperative nausea and vomiting)     Sinus tachycardia     SLE (systemic lupus erythematosus related syndrome)     Syncope and collapse        Past Surgical History:   Past Surgical History:   Procedure Laterality Date    ARTHROSCOPY, KNEE Right 11/19/2013    Per[facility] at [facility] OR    ASD REPAIR  12/2016    BREAST SURGERY      breast augmentation    CHONDRECTOMY, KNEE, SEMILUNAR CARTILAGE Right 11/19/2013    [facility] at [facility] OR    GANGLION CYST EXCISION Left     KNEE ARTHROSCOPY Right     LITHOTRIPSY-EXTRACORPOREAL SHOCK WAVE Right 8/11/2015    [facility]    REPAIR-ATRIAL SEPTAL DEFECT N/A 12/22/2016    [facility] CATH LAB       Family History:   Family History   Adopted: Yes   Problem Relation Age of Onset    Anemia Mother     Fainting Mother     Hypertension Father     Arrhythmia Father     Congenital heart disease Daughter         pda    No Known Problems Maternal Grandmother     No Known Problems Maternal Grandfather     No Known Problems Paternal Grandmother     No Known Problems Paternal Grandfather     No Known Problems Maternal Aunt     No Known Problems Maternal Uncle     No Known Problems Paternal Aunt     No Known Problems Paternal Uncle     Asthma Neg Hx     Clotting disorder Neg Hx     Heart attack Neg Hx     Heart disease Neg Hx     Heart failure Neg Hx     Hyperlipidemia Neg Hx     Stroke Neg Hx     Atrial Septal Defect Neg Hx     Pacemaker/defibrilator Neg Hx     Early death Neg Hx        Medications:   Current Outpatient Medications on File Prior to Visit   Medication Sig Dispense Refill    acetaminophen (TYLENOL) 325 MG tablet Take 325 mg by mouth every 6 (six) hours as needed for Pain.      cyclobenzaprine (FLEXERIL) 5 MG tablet Take 1 tablet (5 mg total) by mouth nightly as needed for Muscle spasms. 20 tablet 0    FLUoxetine (PROZAC)  "20 MG capsule fluoxetine 20 mg capsule   Take 1 capsule every day by oral route.      fluticasone (FLONASE) 50 mcg/actuation nasal spray 1 spray (50 mcg total) by Each Nare route once daily. 16 g 0    hydroxychloroquine (PLAQUENIL) 200 mg tablet Take 1 tablet (200 mg total) by mouth 2 (two) times daily. 60 tablet 7    levonorgestrel (MIRENA) 20 mcg/24 hr (5 years) IUD Mirena 20 mcg/24 hr (5 years) intrauterine device   Take by intrauterine route.      predniSONE (DELTASONE) 5 MG tablet prednisone 5 mg tablet       No current facility-administered medications on file prior to visit.        Allergies:   Review of patient's allergies indicates:   Allergen Reactions    Codeine Nausea And Vomiting    Vicodin [hydrocodone-acetaminophen] Nausea And Vomiting       Social History:   Social History     Socioeconomic History    Marital status:      Spouse name: Not on file    Number of children: 1    Years of education: Not on file    Highest education level: Not on file   Social Needs    Financial resource strain: Not on file    Food insecurity - worry: Not on file    Food insecurity - inability: Not on file    Transportation needs - medical: Not on file    Transportation needs - non-medical: Not on file   Occupational History    Occupation: Housewife   Tobacco Use    Smoking status: Never Smoker    Smokeless tobacco: Never Used   Substance and Sexual Activity    Alcohol use: No    Drug use: No    Sexual activity: Yes     Partners: Male   Other Topics Concern    Not on file   Social History Narrative    Not on file     PHYSICAL EXAMINATION:   General  /71   Pulse 83   Ht 5' 2" (1.575 m)   Wt 63.5 kg (140 lb)   BMI 25.61 kg/m²   Constitutional: Oriented to person, place, and time. No apparent distress. Appears well-developed and well-nourished. Pleasant.  HENT:   Head: Normocephalic and atraumatic.   Eyes: Right eye exhibits no discharge. Left eye exhibits no discharge. No scleral " icterus.   Pulmonary/Chest: Effort normal. No respiratory distress.   Abdominal: There is no guarding.   Neurological: Alert and oriented to person, place, and time.  Reflexes are 2+ in the upper and lower extremities. Sensation to light touch is intact throughout the upper and lower extremity.  Tinnel's at the carpal tunnel and ulnar groove elicited tingling at the palmar aspect of the fifth digit; Michelle's also elicited the same response  4+/5 strength in abductor digiti mini bilaterally  Otherwise 5/5 strength in b/l UE and LE  - Spurling's bilaterally; Spurling on the L elicited localized pain in the L cervical paraspinals  MSK: No TTP in cervical paraspinals or cervical facets.  No atrophy of the intrinsic hand muscles.  No Pain with flexion, extension, or lateral bending of the neck. Full ROM in all planes.  Psychiatric: Behavior is normal.   INSPECTION: There is no swelling, ecchymoses, erythema or gross deformity of the hands.    ASSESSMENT:  Paresthesias of bilateral hands in the palmar aspect of the fourth and fifth digit (R>L) most likely due to bilateral ulnar neuropathy    PLAN:     1. Time was spent reviewing the above diagnosis in depth with Jen today, including acute management and rehabilitation.     2.  We discussed her symptoms are most suggestive of ulnar neuropathy.  We discussed potential EMG/nerve conduction study as a means for definitive diagnosis.  However, we discussed that the recommended treatment will likely be conservative initially even if the diagnosis is confirmed on EMG.  As such, we elected to perform a trial of an ulnar night splint.  We also discussed the mindful of avoiding direct compression of the ulnar nerve at the medial elbow.    3. We will order an ulnar night splint for Jen to be worn on the R side as a trial. We advised her to wear it for 3-4 weeks and to message our office to let us know if it is effective. If not, we can schedule her for EMG/NCS.    This is a  "consult from Isatu Ritter. Please see the "Communications" section of Epic to see how the consulting physician received the report of today's findings and recommendations. If it's an [facility] physician, it will be forwarded to his/her "in basket".    The above note was completed, in part, with the aid of Dragon dictation software/hardware. Translation errors may be present.    "

## 2018-12-13 ENCOUNTER — OFFICE VISIT (OUTPATIENT)
Dept: FAMILY MEDICINE | Facility: CLINIC | Age: 28
End: 2018-12-13
Payer: COMMERCIAL

## 2018-12-13 ENCOUNTER — LAB VISIT (OUTPATIENT)
Dept: LAB | Facility: HOSPITAL | Age: 28
End: 2018-12-13
Attending: INTERNAL MEDICINE
Payer: COMMERCIAL

## 2018-12-13 VITALS
OXYGEN SATURATION: 98 % | HEART RATE: 79 BPM | DIASTOLIC BLOOD PRESSURE: 70 MMHG | SYSTOLIC BLOOD PRESSURE: 114 MMHG | BODY MASS INDEX: 25.81 KG/M2 | TEMPERATURE: 99 F | WEIGHT: 141.13 LBS

## 2018-12-13 DIAGNOSIS — G89.29 CHRONIC PAIN OF BOTH KNEES: ICD-10-CM

## 2018-12-13 DIAGNOSIS — M25.562 CHRONIC PAIN OF BOTH KNEES: ICD-10-CM

## 2018-12-13 DIAGNOSIS — M25.561 CHRONIC PAIN OF BOTH KNEES: ICD-10-CM

## 2018-12-13 DIAGNOSIS — M32.9 SYSTEMIC LUPUS ERYTHEMATOSUS, UNSPECIFIED SLE TYPE, UNSPECIFIED ORGAN INVOLVEMENT STATUS: ICD-10-CM

## 2018-12-13 DIAGNOSIS — F98.8 ATTENTION DEFICIT DISORDER, UNSPECIFIED HYPERACTIVITY PRESENCE: ICD-10-CM

## 2018-12-13 DIAGNOSIS — J06.9 UPPER RESPIRATORY TRACT INFECTION, UNSPECIFIED TYPE: Primary | ICD-10-CM

## 2018-12-13 LAB
ALBUMIN SERPL BCP-MCNC: 3.9 G/DL
ALP SERPL-CCNC: 93 U/L
ALT SERPL W/O P-5'-P-CCNC: 9 U/L
ANION GAP SERPL CALC-SCNC: 7 MMOL/L
AST SERPL-CCNC: 14 U/L
BASOPHILS # BLD AUTO: 0.06 K/UL
BASOPHILS NFR BLD: 0.8 %
BILIRUB SERPL-MCNC: 0.3 MG/DL
BUN SERPL-MCNC: 17 MG/DL
C3 SERPL-MCNC: 83 MG/DL
C4 SERPL-MCNC: 21 MG/DL
CALCIUM SERPL-MCNC: 9.5 MG/DL
CHLORIDE SERPL-SCNC: 106 MMOL/L
CO2 SERPL-SCNC: 27 MMOL/L
CREAT SERPL-MCNC: 0.8 MG/DL
CRP SERPL-MCNC: 2.9 MG/L
DIFFERENTIAL METHOD: ABNORMAL
EOSINOPHIL # BLD AUTO: 0.2 K/UL
EOSINOPHIL NFR BLD: 2.3 %
ERYTHROCYTE [DISTWIDTH] IN BLOOD BY AUTOMATED COUNT: 13.7 %
ERYTHROCYTE [SEDIMENTATION RATE] IN BLOOD BY WESTERGREN METHOD: 10 MM/HR
EST. GFR  (AFRICAN AMERICAN): >60 ML/MIN/1.73 M^2
EST. GFR  (NON AFRICAN AMERICAN): >60 ML/MIN/1.73 M^2
GLUCOSE SERPL-MCNC: 72 MG/DL
HCT VFR BLD AUTO: 36.4 %
HGB BLD-MCNC: 11.5 G/DL
IMM GRANULOCYTES # BLD AUTO: 0.02 K/UL
IMM GRANULOCYTES NFR BLD AUTO: 0.3 %
LYMPHOCYTES # BLD AUTO: 1.5 K/UL
LYMPHOCYTES NFR BLD: 20.3 %
MCH RBC QN AUTO: 28.3 PG
MCHC RBC AUTO-ENTMCNC: 31.6 G/DL
MCV RBC AUTO: 90 FL
MONOCYTES # BLD AUTO: 0.5 K/UL
MONOCYTES NFR BLD: 6.9 %
NEUTROPHILS # BLD AUTO: 5.1 K/UL
NEUTROPHILS NFR BLD: 69.4 %
NRBC BLD-RTO: 0 /100 WBC
PLATELET # BLD AUTO: 228 K/UL
PMV BLD AUTO: 11.6 FL
POTASSIUM SERPL-SCNC: 3.7 MMOL/L
PROT SERPL-MCNC: 7.4 G/DL
RBC # BLD AUTO: 4.06 M/UL
SODIUM SERPL-SCNC: 140 MMOL/L
WBC # BLD AUTO: 7.29 K/UL

## 2018-12-13 PROCEDURE — 86160 COMPLEMENT ANTIGEN: CPT

## 2018-12-13 PROCEDURE — 86162 COMPLEMENT TOTAL (CH50): CPT

## 2018-12-13 PROCEDURE — 99999 PR PBB SHADOW E&M-EST. PATIENT-LVL III: CPT | Mod: PBBFAC,,, | Performed by: FAMILY MEDICINE

## 2018-12-13 PROCEDURE — 86038 ANTINUCLEAR ANTIBODIES: CPT

## 2018-12-13 PROCEDURE — 99214 OFFICE O/P EST MOD 30 MIN: CPT | Mod: S$GLB,,, | Performed by: FAMILY MEDICINE

## 2018-12-13 PROCEDURE — 80053 COMPREHEN METABOLIC PANEL: CPT

## 2018-12-13 PROCEDURE — 3008F BODY MASS INDEX DOCD: CPT | Mod: CPTII,S$GLB,, | Performed by: FAMILY MEDICINE

## 2018-12-13 PROCEDURE — 86140 C-REACTIVE PROTEIN: CPT

## 2018-12-13 PROCEDURE — 86160 COMPLEMENT ANTIGEN: CPT | Mod: 59

## 2018-12-13 PROCEDURE — 85025 COMPLETE CBC W/AUTO DIFF WBC: CPT

## 2018-12-13 PROCEDURE — 36415 COLL VENOUS BLD VENIPUNCTURE: CPT | Mod: PO

## 2018-12-13 PROCEDURE — 85651 RBC SED RATE NONAUTOMATED: CPT | Mod: PO

## 2018-12-13 RX ORDER — DEXTROAMPHETAMINE SACCHARATE, AMPHETAMINE ASPARTATE, DEXTROAMPHETAMINE SULFATE AND AMPHETAMINE SULFATE 2.5; 2.5; 2.5; 2.5 MG/1; MG/1; MG/1; MG/1
1 TABLET ORAL DAILY PRN
Qty: 30 TABLET | Refills: 0 | Status: SHIPPED | OUTPATIENT
Start: 2018-12-13 | End: 2019-02-11 | Stop reason: SDUPTHER

## 2018-12-14 LAB — ANA SER QL IF: NORMAL

## 2018-12-15 ENCOUNTER — OFFICE VISIT (OUTPATIENT)
Dept: URGENT CARE | Facility: CLINIC | Age: 28
End: 2018-12-15
Payer: COMMERCIAL

## 2018-12-15 VITALS
HEIGHT: 62 IN | DIASTOLIC BLOOD PRESSURE: 75 MMHG | SYSTOLIC BLOOD PRESSURE: 108 MMHG | RESPIRATION RATE: 16 BRPM | OXYGEN SATURATION: 99 % | TEMPERATURE: 99 F | BODY MASS INDEX: 25.95 KG/M2 | WEIGHT: 141 LBS | HEART RATE: 89 BPM

## 2018-12-15 DIAGNOSIS — M25.572 LEFT ANKLE PAIN, UNSPECIFIED CHRONICITY: ICD-10-CM

## 2018-12-15 DIAGNOSIS — M79.672 LEFT FOOT PAIN: Primary | ICD-10-CM

## 2018-12-15 PROCEDURE — 73610 X-RAY EXAM OF ANKLE: CPT | Mod: LT,S$GLB,, | Performed by: RADIOLOGY

## 2018-12-15 PROCEDURE — 99214 OFFICE O/P EST MOD 30 MIN: CPT | Mod: S$GLB,,, | Performed by: PHYSICIAN ASSISTANT

## 2018-12-15 PROCEDURE — 73630 X-RAY EXAM OF FOOT: CPT | Mod: LT,S$GLB,, | Performed by: RADIOLOGY

## 2018-12-15 RX ORDER — MELOXICAM 15 MG/1
15 TABLET ORAL DAILY
Qty: 30 TABLET | Refills: 1 | Status: SHIPPED | OUTPATIENT
Start: 2018-12-15 | End: 2019-01-29

## 2018-12-15 NOTE — PROGRESS NOTES
"Subjective:       Patient ID: Jen Lazar is a 28 y.o. female.    Vitals:  height is 5' 2" (1.575 m) and weight is 64 kg (141 lb). Her oral temperature is 98.6 °F (37 °C). Her blood pressure is 108/75 and her pulse is 89. Her respiration is 16 and oxygen saturation is 99%.     Chief Complaint: Foot Pain (left foot)    Pt c/o left foot and ankle pain with  swelling after rolling ankle on toys on floor, x 5 days       Foot Injury    The incident occurred 5 to 7 days ago. The incident occurred at home. The injury mechanism was an inversion injury. The pain is present in the right ankle and right foot. The quality of the pain is described as aching. The pain is at a severity of 4/10. The pain is mild. The pain has been constant since onset. Pertinent negatives include no numbness or tingling. The symptoms are aggravated by movement and weight bearing. She has tried acetaminophen for the symptoms. The treatment provided no relief.       Constitution: Negative for fatigue.   HENT: Negative for facial swelling and facial trauma.    Neck: Negative for neck stiffness.   Cardiovascular: Negative for chest trauma.   Eyes: Negative for eye trauma, double vision and blurred vision.   Gastrointestinal: Negative for abdominal trauma, abdominal pain and rectal bleeding.   Genitourinary: Negative for hematuria, missed menses, genital trauma and pelvic pain.   Musculoskeletal: Positive for pain (left  ankle and foot pain). Negative for trauma, joint swelling and abnormal ROM of joint.   Skin: Negative for color change, wound, abrasion, laceration and bruising.   Neurological: Negative for dizziness, history of vertigo, light-headedness, coordination disturbances, altered mental status, loss of consciousness and numbness.   Hematologic/Lymphatic: Negative for history of bleeding disorder.   Psychiatric/Behavioral: Negative for altered mental status.       Objective:      Physical Exam   Constitutional: She is oriented to " person, place, and time. She appears well-developed and well-nourished. She is cooperative.  Non-toxic appearance. She does not appear ill. No distress.   HENT:   Head: Normocephalic and atraumatic. Head is without abrasion, without contusion and without laceration.   Right Ear: Hearing, tympanic membrane, external ear and ear canal normal. No hemotympanum.   Left Ear: Hearing, tympanic membrane, external ear and ear canal normal. No hemotympanum.   Nose: Nose normal. No mucosal edema, rhinorrhea or nasal deformity. No epistaxis. Right sinus exhibits no maxillary sinus tenderness and no frontal sinus tenderness. Left sinus exhibits no maxillary sinus tenderness and no frontal sinus tenderness.   Mouth/Throat: Uvula is midline, oropharynx is clear and moist and mucous membranes are normal. No trismus in the jaw. Normal dentition. No uvula swelling. No posterior oropharyngeal erythema.   Eyes: Conjunctivae, EOM and lids are normal. Pupils are equal, round, and reactive to light. Right eye exhibits no discharge. Left eye exhibits no discharge. No scleral icterus.   Sclera clear bilat   Neck: Trachea normal, normal range of motion, full passive range of motion without pain and phonation normal. Neck supple. No spinous process tenderness and no muscular tenderness present. No neck rigidity. No tracheal deviation present.   Cardiovascular: Normal rate, regular rhythm, normal heart sounds, intact distal pulses and normal pulses.   Pulmonary/Chest: Effort normal and breath sounds normal. No respiratory distress.   Abdominal: Soft. Normal appearance and bowel sounds are normal. She exhibits no distension, no pulsatile midline mass and no mass. There is no tenderness.   Musculoskeletal: Normal range of motion. She exhibits no edema or deformity.        Left foot: There is bony tenderness and swelling. There is normal range of motion, no tenderness, no deformity and no laceration.        Feet:    Neurological: She is alert and  oriented to person, place, and time. She has normal strength. No cranial nerve deficit or sensory deficit. She exhibits normal muscle tone. She displays no seizure activity. Coordination normal. GCS eye subscore is 4. GCS verbal subscore is 5. GCS motor subscore is 6.   Skin: Skin is warm, dry and intact. Capillary refill takes less than 2 seconds. No abrasion, no bruising, no burn, no ecchymosis and no laceration noted. She is not diaphoretic. No pallor.   Psychiatric: She has a normal mood and affect. Her speech is normal and behavior is normal. Judgment and thought content normal. Cognition and memory are normal.   Nursing note and vitals reviewed.      Assessment:       1. Left foot pain    2. Left ankle pain, unspecified chronicity        Plan:         Left foot pain  -     XR FOOT COMPLETE 3 VIEW LEFT; Future; Expected date: 12/15/2018  -     meloxicam (MOBIC) 15 MG tablet; Take 1 tablet (15 mg total) by mouth once daily. Take with food  Dispense: 30 tablet; Refill: 1    Left ankle pain, unspecified chronicity  -     XR ANKLE COMPLETE 3 VIEW LEFT; Future; Expected date: 12/15/2018  -     meloxicam (MOBIC) 15 MG tablet; Take 1 tablet (15 mg total) by mouth once daily. Take with food  Dispense: 30 tablet; Refill: 1    Xr Foot Complete 3 View Left    Result Date: 12/15/2018  EXAMINATION: XR FOOT COMPLETE 3 VIEW LEFT CLINICAL HISTORY: .  Pain in left foot TECHNIQUE: AP, lateral and oblique views of the left foot were performed. COMPARISON: None FINDINGS: Bone mineralization is within normal limits.  Joint spaces are maintained.  No fracture or dislocation is identified.  The soft tissues appear normal.     No fracture or dislocation. Electronically signed by: Mckenna Baeza MD Date:    12/15/2018 Time:    16:05     No fracture or dislocation noted on ankle films.     Patient Instructions     Foot Contusion  You have a contusion. This is also called a bruise. There is swelling and some bleeding under the skin, but  no broken bones. This injury generally takes a few days to a few weeks to heal.  During that time, the bruise will typically change in color from reddish, to purple-blue, to greenish-yellow, then to yellow-brown.  Home care  · Elevate the foot to reduce pain and swelling. As much as possible, sit or lie down with the foot raised about the level of your heart. This is especially important during the first 48 hours.  · Ice the foot to help reduce pain and swelling. Wrap a cold source (ice pack or ice cubes in a plastic bag) in a thin towel. Apply to the bruised area for 20 minutes every 1 to 2 hours the first day. Continue this 3 to 4 times a day until the pain and swelling goes away.  · Unless another medicine was prescribed, you can take acetaminophen, ibuprofen, or naproxen to control pain. (If you have chronic liver or kidney disease or ever had a stomach ulcer or gastrointestinal bleeding, talk with your healthcare provider before using these medicines.)  Follow up  Follow up with your healthcare provider or our staff as advised. Call if you are not improving within 1 to 2 weeks.  When to seek medical advice   Call your healthcare provider right away if you have any of the following:  · Increased pain or swelling  · Foot or leg becomes cold, blue, numb or tingly  · Signs of infection: Warmth, drainage, or increased redness or pain around the bruise  · Inability to move the injured foot   · Frequent bruising for unknown reasons  Date Last Reviewed: 2/1/2017  © 6142-1740 [employer]. [address] [place] [address], [place] [address]. All rights reserved. This information is not intended as a substitute for professional medical care. Always follow your healthcare professional's instructions.    If you were prescribed a narcotic medication, do not drive or operate heavy equipment or machinery while taking these medications.  You must understand that you've received an Urgent Care treatment only and that you may  be released before all your medical problems are known or treated. You, the patient, will arrange for follow up care as instructed.  Follow up with your PCP or specialty clinic as directed in the next 1-2 weeks if not improved or as needed.  You can call [phone] to schedule an appointment with the appropriate provider.  If your condition worsens we recommend that you receive another evaluation at the emergency room immediately or contact your primary medical clinics after hours call service to discuss your concerns.  Please return here or go to the Emergency Department for any concerns or worsening of condition.

## 2018-12-15 NOTE — PATIENT INSTRUCTIONS
Foot Contusion  You have a contusion. This is also called a bruise. There is swelling and some bleeding under the skin, but no broken bones. This injury generally takes a few days to a few weeks to heal.  During that time, the bruise will typically change in color from reddish, to purple-blue, to greenish-yellow, then to yellow-brown.  Home care  · Elevate the foot to reduce pain and swelling. As much as possible, sit or lie down with the foot raised about the level of your heart. This is especially important during the first 48 hours.  · Ice the foot to help reduce pain and swelling. Wrap a cold source (ice pack or ice cubes in a plastic bag) in a thin towel. Apply to the bruised area for 20 minutes every 1 to 2 hours the first day. Continue this 3 to 4 times a day until the pain and swelling goes away.  · Unless another medicine was prescribed, you can take acetaminophen, ibuprofen, or naproxen to control pain. (If you have chronic liver or kidney disease or ever had a stomach ulcer or gastrointestinal bleeding, talk with your healthcare provider before using these medicines.)  Follow up  Follow up with your healthcare provider or our staff as advised. Call if you are not improving within 1 to 2 weeks.  When to seek medical advice   Call your healthcare provider right away if you have any of the following:  · Increased pain or swelling  · Foot or leg becomes cold, blue, numb or tingly  · Signs of infection: Warmth, drainage, or increased redness or pain around the bruise  · Inability to move the injured foot   · Frequent bruising for unknown reasons  Date Last Reviewed: 2/1/2017  © 8477-9731 [employer]. [address] [place] [address], [place] [address]. All rights reserved. This information is not intended as a substitute for professional medical care. Always follow your healthcare professional's instructions.    If you were prescribed a narcotic medication, do not drive or operate heavy equipment or  machinery while taking these medications.  You must understand that you've received an Urgent Care treatment only and that you may be released before all your medical problems are known or treated. You, the patient, will arrange for follow up care as instructed.  Follow up with your PCP or specialty clinic as directed in the next 1-2 weeks if not improved or as needed.  You can call [phone] to schedule an appointment with the appropriate provider.  If your condition worsens we recommend that you receive another evaluation at the emergency room immediately or contact your primary medical clinics after hours call service to discuss your concerns.  Please return here or go to the Emergency Department for any concerns or worsening of condition.

## 2018-12-17 LAB — CH50 SERPL-ACNC: 60 U/ML

## 2018-12-19 ENCOUNTER — TELEPHONE (OUTPATIENT)
Dept: URGENT CARE | Facility: CLINIC | Age: 28
End: 2018-12-19

## 2018-12-21 ENCOUNTER — OFFICE VISIT (OUTPATIENT)
Dept: FAMILY MEDICINE | Facility: CLINIC | Age: 28
End: 2018-12-21
Payer: COMMERCIAL

## 2018-12-21 VITALS
SYSTOLIC BLOOD PRESSURE: 110 MMHG | WEIGHT: 140.63 LBS | DIASTOLIC BLOOD PRESSURE: 76 MMHG | BODY MASS INDEX: 25.88 KG/M2 | HEIGHT: 62 IN | HEART RATE: 108 BPM | OXYGEN SATURATION: 98 % | TEMPERATURE: 98 F | RESPIRATION RATE: 16 BRPM

## 2018-12-21 DIAGNOSIS — H10.10 ALLERGIC CONJUNCTIVITIS, UNSPECIFIED LATERALITY: ICD-10-CM

## 2018-12-21 DIAGNOSIS — J30.9 ALLERGIC RHINITIS, UNSPECIFIED SEASONALITY, UNSPECIFIED TRIGGER: Primary | ICD-10-CM

## 2018-12-21 LAB
FLUAV AG SPEC QL IA: NEGATIVE
FLUBV AG SPEC QL IA: NEGATIVE
SPECIMEN SOURCE: NORMAL

## 2018-12-21 PROCEDURE — 99214 OFFICE O/P EST MOD 30 MIN: CPT | Mod: S$GLB,,, | Performed by: PHYSICIAN ASSISTANT

## 2018-12-21 PROCEDURE — 87400 INFLUENZA A/B EACH AG IA: CPT | Mod: 59,PO

## 2018-12-21 PROCEDURE — 99999 PR PBB SHADOW E&M-EST. PATIENT-LVL III: CPT | Mod: PBBFAC,,, | Performed by: PHYSICIAN ASSISTANT

## 2018-12-21 PROCEDURE — 3008F BODY MASS INDEX DOCD: CPT | Mod: CPTII,S$GLB,, | Performed by: PHYSICIAN ASSISTANT

## 2018-12-21 RX ORDER — METHYLPREDNISOLONE 4 MG/1
TABLET ORAL
Qty: 21 TABLET | Refills: 0 | Status: SHIPPED | OUTPATIENT
Start: 2018-12-21 | End: 2019-01-11

## 2018-12-21 RX ORDER — KETOROLAC TROMETHAMINE 5 MG/ML
1 SOLUTION OPHTHALMIC 4 TIMES DAILY
Qty: 5 ML | Refills: 0 | Status: SHIPPED | OUTPATIENT
Start: 2018-12-21 | End: 2019-01-09

## 2018-12-21 NOTE — PROGRESS NOTES
Subjective:       Patient ID: Jen Lazar is a 28 y.o. female    Chief Complaint: Cough and Eye Drainage (bilateral)    HPI   The patient presents today complaining of sore throat, cough and itchy watery eyes for the past week.  She denies any history of allergies, no body aches, no fever or chills.     Review of Systems   Constitutional: Negative for chills and fever.   HENT: Positive for congestion.    Eyes: Positive for discharge ( watery), redness and itching. Negative for visual disturbance.   Respiratory: Positive for cough.    Cardiovascular: Negative for chest pain.   Gastrointestinal: Negative for abdominal pain, diarrhea, nausea and vomiting.   Endocrine: Negative for polydipsia.   Skin: Negative for rash.   Neurological: Negative for headaches.        Objective:   Physical Exam   Constitutional: She is oriented to person, place, and time. She appears well-developed and well-nourished.   HENT:   Head: Normocephalic and atraumatic.   Right Ear: External ear normal.   Left Ear: External ear normal.   Nose: Nose normal.   Mouth/Throat: Oropharynx is clear and moist. No oropharyngeal exudate.   Nasal congestion bilaterally   Eyes: Conjunctivae and EOM are normal. Pupils are equal, round, and reactive to light.   Clear tears   Neck: Normal range of motion. Neck supple. No thyromegaly present.   Cardiovascular: Normal rate, regular rhythm, normal heart sounds and intact distal pulses. Exam reveals no gallop and no friction rub.   No murmur heard.  Pulmonary/Chest: Effort normal and breath sounds normal. No respiratory distress. She has no wheezes. She has no rales.   Musculoskeletal: Normal range of motion. She exhibits no edema.   Lymphadenopathy:     She has no cervical adenopathy.   Neurological: She is alert and oriented to person, place, and time. She has normal reflexes.   Skin: Skin is warm and dry. No rash noted.   Psychiatric: She has a normal mood and affect. Her behavior is normal. Judgment  and thought content normal.        Assessment:       1. Allergic rhinitis, unspecified seasonality, unspecified trigger  methylPREDNISolone (MEDROL DOSEPACK) 4 mg tablet    Influenza antigen Nasopharyngeal Swab   2. Allergic conjunctivitis, unspecified laterality  ketorolac 0.5% (ACULAR) 0.5 % Drop        Plan:       Allergic rhinitis, unspecified seasonality, unspecified trigger  -     start methylPREDNISolone (MEDROL DOSEPACK) 4 mg tablet; use as directed  Dispense: 21 tablet; Refill: 0  -     Influenza antigen Nasopharyngeal Swab    Allergic conjunctivitis, unspecified laterality  -    start ketorolac 0.5% (ACULAR) 0.5 % Drop; Place 1 drop into both eyes 4 (four) times daily. for 10 days  Dispense: 5 mL; Refill: 0

## 2019-01-23 ENCOUNTER — HOSPITAL ENCOUNTER (OUTPATIENT)
Dept: CARDIOLOGY | Facility: CLINIC | Age: 29
Discharge: HOME | End: 2019-01-23
Attending: INTERNAL MEDICINE
Payer: COMMERCIAL

## 2019-01-23 ENCOUNTER — OFFICE VISIT (OUTPATIENT)
Dept: CARDIOLOGY | Facility: CLINIC | Age: 29
End: 2019-01-23
Payer: COMMERCIAL

## 2019-01-23 VITALS
HEIGHT: 62 IN | BODY MASS INDEX: 26.86 KG/M2 | OXYGEN SATURATION: 96 % | BODY MASS INDEX: 25.4 KG/M2 | HEART RATE: 71 BPM | SYSTOLIC BLOOD PRESSURE: 104 MMHG | HEIGHT: 62 IN | WEIGHT: 145.94 LBS | DIASTOLIC BLOOD PRESSURE: 72 MMHG | DIASTOLIC BLOOD PRESSURE: 78 MMHG | HEART RATE: 78 BPM | SYSTOLIC BLOOD PRESSURE: 110 MMHG | WEIGHT: 138 LBS

## 2019-01-23 DIAGNOSIS — Q21.12 PFO (PATENT FORAMEN OVALE): ICD-10-CM

## 2019-01-23 DIAGNOSIS — Q21.10 ASD (ATRIAL SEPTAL DEFECT): Primary | ICD-10-CM

## 2019-01-23 DIAGNOSIS — Q21.12 PFO (PATENT FORAMEN OVALE): Primary | ICD-10-CM

## 2019-01-23 LAB
ASCENDING AORTA: 2.62 CM
AV INDEX (PROSTH): 0.88
AV MEAN GRADIENT: 3.81 MMHG
AV PEAK GRADIENT: 7.84 MMHG
AV VALVE AREA: 2.57 CM2
AV VELOCITY RATIO: 0.88
BSA FOR ECHO PROCEDURE: 1.65 M2
CV ECHO LV RWT: 0.3 CM
DOP CALC AO PEAK VEL: 1.4 M/S
DOP CALC AO VTI: 27.05 CM
DOP CALC LVOT AREA: 2.92 CM2
DOP CALC LVOT DIAMETER: 1.93 CM
DOP CALC LVOT PEAK VEL: 1.23 M/S
DOP CALC LVOT STROKE VOLUME: 69.42 CM3
DOP CALCLVOT PEAK VEL VTI: 23.74 CM
E WAVE DECELERATION TIME: 192.52 MSEC
E/A RATIO: 1.6
E/E' RATIO: 5.48
ECHO LV POSTERIOR WALL: 0.71 CM (ref 0.6–1.1)
FRACTIONAL SHORTENING: 33 % (ref 28–44)
INTERVENTRICULAR SEPTUM: 0.73 CM (ref 0.6–1.1)
LA MAJOR: 5.19 CM
LA MINOR: 5.29 CM
LA WIDTH: 3.18 CM
LEFT ATRIUM SIZE: 3.19 CM
LEFT ATRIUM VOLUME INDEX: 27 ML/M2
LEFT ATRIUM VOLUME: 45.18 CM3
LEFT INTERNAL DIMENSION IN SYSTOLE: 3.16 CM (ref 2.1–4)
LEFT VENTRICLE DIASTOLIC VOLUME INDEX: 61.82 ML/M2
LEFT VENTRICLE DIASTOLIC VOLUME: 103.36 ML
LEFT VENTRICLE MASS INDEX: 64.3 G/M2
LEFT VENTRICLE SYSTOLIC VOLUME INDEX: 23.7 ML/M2
LEFT VENTRICLE SYSTOLIC VOLUME: 39.65 ML
LEFT VENTRICULAR INTERNAL DIMENSION IN DIASTOLE: 4.72 CM (ref 3.5–6)
LEFT VENTRICULAR MASS: 107.59 G
LV LATERAL E/E' RATIO: 4.47
LV SEPTAL E/E' RATIO: 7.08
MV PEAK A VEL: 0.53 M/S
MV PEAK E VEL: 0.85 M/S
PISA TR MAX VEL: 2.08 M/S
PULM VEIN S/D RATIO: 0.67
PV PEAK D VEL: 0.73 M/S
PV PEAK S VEL: 0.49 M/S
RA MAJOR: 4.16 CM
RA PRESSURE: 3 MMHG
RA WIDTH: 3.23 CM
RIGHT VENTRICULAR END-DIASTOLIC DIMENSION: 2.94 CM
SINUS: 2.86 CM
STJ: 2.11 CM
TDI LATERAL: 0.19
TDI SEPTAL: 0.12
TDI: 0.16
TR MAX PG: 17.31 MMHG
TRICUSPID ANNULAR PLANE SYSTOLIC EXCURSION: 2.43 CM
TV REST PULMONARY ARTERY PRESSURE: 20 MMHG

## 2019-01-23 PROCEDURE — 99212 PR OFFICE/OUTPT VISIT, EST, LEVL II, 10-19 MIN: ICD-10-PCS | Mod: S$GLB,,, | Performed by: INTERNAL MEDICINE

## 2019-01-23 PROCEDURE — 93306 TRANSTHORACIC ECHO (TTE) COMPLETE: ICD-10-PCS | Mod: S$GLB,,, | Performed by: INTERNAL MEDICINE

## 2019-01-23 PROCEDURE — 99999 PR PBB SHADOW E&M-EST. PATIENT-LVL III: CPT | Mod: PBBFAC,,, | Performed by: INTERNAL MEDICINE

## 2019-01-23 PROCEDURE — 3008F PR BODY MASS INDEX (BMI) DOCUMENTED: ICD-10-PCS | Mod: CPTII,S$GLB,, | Performed by: INTERNAL MEDICINE

## 2019-01-23 PROCEDURE — 3008F BODY MASS INDEX DOCD: CPT | Mod: CPTII,S$GLB,, | Performed by: INTERNAL MEDICINE

## 2019-01-23 PROCEDURE — 99999 PR PBB SHADOW E&M-EST. PATIENT-LVL III: ICD-10-PCS | Mod: PBBFAC,,, | Performed by: INTERNAL MEDICINE

## 2019-01-23 PROCEDURE — 99212 OFFICE O/P EST SF 10 MIN: CPT | Mod: S$GLB,,, | Performed by: INTERNAL MEDICINE

## 2019-01-23 PROCEDURE — 93306 TTE W/DOPPLER COMPLETE: CPT | Mod: S$GLB,,, | Performed by: INTERNAL MEDICINE

## 2019-01-23 NOTE — PROGRESS NOTES
Subjective:    Patient ID:  Jen Lazar is a 28 y.o. female who presents for follow-up of PFO s/p closure    HPI  Pt is a 27 yo F w/ PMH of PFO s/p closure 12/16 and SLE who presents for f/u evaluation of PFO s/p closure.  She mentions that she had sob and HA prior to closure but has not had reoccurrence since closure.  She notes that she is doing well and has no complaints.  She denies any cp, sob, orthopnea, PND, palpitations, presyncope, edema, syncope, and claudication.  She is no loinger on ASA.  Last echo noted no intracardiac shunt and current echo done 1/23/19 is pending.  There were no other reports.       Review of Systems   Constitution: Negative for diaphoresis and fever.   HENT: Negative for congestion and hearing loss.    Eyes: Negative for blurred vision and pain.   Cardiovascular: Negative for chest pain, claudication, dyspnea on exertion, leg swelling, near-syncope, palpitations and syncope.   Respiratory: Negative for shortness of breath and sleep disturbances due to breathing.    Hematologic/Lymphatic: Negative for bleeding problem. Does not bruise/bleed easily.   Skin: Negative for color change and poor wound healing.   Gastrointestinal: Negative for abdominal pain and nausea.   Genitourinary: Negative for bladder incontinence and flank pain.   Neurological: Negative for focal weakness and light-headedness.        Objective:    Physical Exam   Constitutional: She is oriented to person, place, and time. She appears well-developed and well-nourished.   HENT:   Head: Normocephalic and atraumatic.   Mouth/Throat: Oropharynx is clear and moist.   Eyes: EOM are normal. Pupils are equal, round, and reactive to light. No scleral icterus.   Neck: Normal range of motion. Neck supple. No JVD present.   Cardiovascular: Normal rate, regular rhythm, S1 normal, S2 normal and intact distal pulses. Exam reveals no gallop and no friction rub.   No murmur heard.  Pulmonary/Chest: Effort normal and breath  sounds normal. No respiratory distress. She has no wheezes. She has no rales. She exhibits no tenderness.   Abdominal: Soft. Bowel sounds are normal. She exhibits no distension and no mass. There is no tenderness. There is no rebound.   Musculoskeletal: Normal range of motion. She exhibits no edema or tenderness.   Neurological: She is alert and oriented to person, place, and time. She displays normal reflexes. Coordination normal.   Skin: Skin is warm and dry. She is not diaphoretic. No pallor.   Psychiatric: She has a normal mood and affect. Her behavior is normal. Judgment normal.         Assessment:       1. PFO (patent foramen ovale)         Plan:       PFO (patent foramen ovale)  Resolved s/p closure.  Pt w/o further episodes of symptoms.    - continue to monitor w/ serial echoes, next in 2 years  - RTC in 2 yrs          Sebastian Foreman M.D.  Interventional Cardiology

## 2019-01-23 NOTE — ASSESSMENT & PLAN NOTE
Resolved s/p closure.  Pt w/o further episodes of symptoms.    - continue to monitor w/ serial echoes, next in 2 years  - RTC in 2 yrs

## 2019-01-24 ENCOUNTER — PATIENT MESSAGE (OUTPATIENT)
Dept: RHEUMATOLOGY | Facility: CLINIC | Age: 29
End: 2019-01-24

## 2019-01-24 RX ORDER — OSELTAMIVIR PHOSPHATE 75 MG/1
75 CAPSULE ORAL DAILY
Qty: 5 CAPSULE | Refills: 0 | Status: SHIPPED | OUTPATIENT
Start: 2019-01-24 | End: 2019-01-29

## 2019-01-24 NOTE — TELEPHONE ENCOUNTER
Informed patient tamiflu was sent to pharmacy,   Spoke with [facility] who states tamiflu comes in packs of 10 and is usually Rx as 1 daily for 10 days. Gave verbal for direction change.

## 2019-01-24 NOTE — TELEPHONE ENCOUNTER
----- Message from Haleigh NICKI Beaver sent at 1/24/2019 12:56 PM CST -----  Contact: PT  PT's daughter tested positive for the flu,   PT beleives she has it now too.  She has fever, chills, congestions, runny and stuffy nose.   Can medication be sent to her pharmacy please so she doesn't have to come in.     Callback: [phone]

## 2019-01-28 ENCOUNTER — PATIENT MESSAGE (OUTPATIENT)
Dept: RHEUMATOLOGY | Facility: CLINIC | Age: 29
End: 2019-01-28

## 2019-01-29 ENCOUNTER — OFFICE VISIT (OUTPATIENT)
Dept: RHEUMATOLOGY | Facility: CLINIC | Age: 29
End: 2019-01-29
Payer: COMMERCIAL

## 2019-01-29 ENCOUNTER — LAB VISIT (OUTPATIENT)
Dept: LAB | Facility: HOSPITAL | Age: 29
End: 2019-01-29
Attending: PHYSICIAN ASSISTANT
Payer: COMMERCIAL

## 2019-01-29 VITALS
BODY MASS INDEX: 26.31 KG/M2 | HEART RATE: 88 BPM | SYSTOLIC BLOOD PRESSURE: 105 MMHG | HEIGHT: 62 IN | WEIGHT: 143 LBS | DIASTOLIC BLOOD PRESSURE: 64 MMHG

## 2019-01-29 DIAGNOSIS — M32.9 SYSTEMIC LUPUS ERYTHEMATOSUS, UNSPECIFIED SLE TYPE, UNSPECIFIED ORGAN INVOLVEMENT STATUS: Primary | ICD-10-CM

## 2019-01-29 DIAGNOSIS — B99.9 RECURRENT INFECTIONS: ICD-10-CM

## 2019-01-29 DIAGNOSIS — R79.89 LOW TSH LEVEL: ICD-10-CM

## 2019-01-29 DIAGNOSIS — D50.9 IRON DEFICIENCY ANEMIA, UNSPECIFIED IRON DEFICIENCY ANEMIA TYPE: ICD-10-CM

## 2019-01-29 DIAGNOSIS — J32.9 SINUSITIS, UNSPECIFIED CHRONICITY, UNSPECIFIED LOCATION: ICD-10-CM

## 2019-01-29 DIAGNOSIS — R53.83 FATIGUE, UNSPECIFIED TYPE: ICD-10-CM

## 2019-01-29 DIAGNOSIS — Z79.899 LONG-TERM USE OF PLAQUENIL: ICD-10-CM

## 2019-01-29 LAB
FERRITIN SERPL-MCNC: 18 NG/ML
IGA SERPL-MCNC: 238 MG/DL
IGE SERPL-ACNC: <35 IU/ML
IGG SERPL-MCNC: 1226 MG/DL
IGM SERPL-MCNC: 121 MG/DL
IRON SERPL-MCNC: 26 UG/DL
SATURATED IRON: 6 %
T3FREE SERPL-MCNC: 2.8 PG/ML
T4 FREE SERPL-MCNC: 0.9 NG/DL
THYROGLOB AB SERPL IA-ACNC: 68.7 IU/ML
THYROPEROXIDASE IGG SERPL-ACNC: <6 IU/ML
TOTAL IRON BINDING CAPACITY: 443 UG/DL
TRANSFERRIN SERPL-MCNC: 299 MG/DL
TSH SERPL DL<=0.005 MIU/L-ACNC: 0.8 UIU/ML

## 2019-01-29 PROCEDURE — 86800 THYROGLOBULIN ANTIBODY: CPT

## 2019-01-29 PROCEDURE — 99214 PR OFFICE/OUTPT VISIT, EST, LEVL IV, 30-39 MIN: ICD-10-PCS | Mod: 25,S$GLB,, | Performed by: PHYSICIAN ASSISTANT

## 2019-01-29 PROCEDURE — 83540 ASSAY OF IRON: CPT

## 2019-01-29 PROCEDURE — 99999 PR PBB SHADOW E&M-EST. PATIENT-LVL III: ICD-10-PCS | Mod: PBBFAC,,, | Performed by: PHYSICIAN ASSISTANT

## 2019-01-29 PROCEDURE — 99214 OFFICE O/P EST MOD 30 MIN: CPT | Mod: 25,S$GLB,, | Performed by: PHYSICIAN ASSISTANT

## 2019-01-29 PROCEDURE — 82784 ASSAY IGA/IGD/IGG/IGM EACH: CPT | Mod: 59

## 2019-01-29 PROCEDURE — 96372 PR INJECTION,THERAP/PROPH/DIAG2ST, IM OR SUBCUT: ICD-10-PCS | Mod: S$GLB,,, | Performed by: PHYSICIAN ASSISTANT

## 2019-01-29 PROCEDURE — 84481 FREE ASSAY (FT-3): CPT

## 2019-01-29 PROCEDURE — 99999 PR PBB SHADOW E&M-EST. PATIENT-LVL III: CPT | Mod: PBBFAC,,, | Performed by: PHYSICIAN ASSISTANT

## 2019-01-29 PROCEDURE — 86376 MICROSOMAL ANTIBODY EACH: CPT

## 2019-01-29 PROCEDURE — 82785 ASSAY OF IGE: CPT

## 2019-01-29 PROCEDURE — 82728 ASSAY OF FERRITIN: CPT

## 2019-01-29 PROCEDURE — 3008F PR BODY MASS INDEX (BMI) DOCUMENTED: ICD-10-PCS | Mod: CPTII,S$GLB,, | Performed by: PHYSICIAN ASSISTANT

## 2019-01-29 PROCEDURE — 3008F BODY MASS INDEX DOCD: CPT | Mod: CPTII,S$GLB,, | Performed by: PHYSICIAN ASSISTANT

## 2019-01-29 PROCEDURE — 84443 ASSAY THYROID STIM HORMONE: CPT

## 2019-01-29 PROCEDURE — 96372 THER/PROPH/DIAG INJ SC/IM: CPT | Mod: S$GLB,,, | Performed by: PHYSICIAN ASSISTANT

## 2019-01-29 PROCEDURE — 82787 IGG 1 2 3 OR 4 EACH: CPT

## 2019-01-29 PROCEDURE — 84439 ASSAY OF FREE THYROXINE: CPT

## 2019-01-29 PROCEDURE — 82784 ASSAY IGA/IGD/IGG/IGM EACH: CPT

## 2019-01-29 RX ORDER — KETOROLAC TROMETHAMINE 30 MG/ML
60 INJECTION, SOLUTION INTRAMUSCULAR; INTRAVENOUS
Status: COMPLETED | OUTPATIENT
Start: 2019-01-29 | End: 2019-01-29

## 2019-01-29 RX ORDER — LEVOFLOXACIN 500 MG/1
500 TABLET, FILM COATED ORAL DAILY
Qty: 7 TABLET | Refills: 0 | Status: SHIPPED | OUTPATIENT
Start: 2019-01-29 | End: 2019-03-25 | Stop reason: ALTCHOICE

## 2019-01-29 RX ORDER — PREDNISONE 5 MG/1
10 TABLET ORAL DAILY
Qty: 60 TABLET | Refills: 3 | Status: CANCELLED | OUTPATIENT
Start: 2019-01-29

## 2019-01-29 RX ORDER — LEVOFLOXACIN 500 MG/1
500 TABLET, FILM COATED ORAL DAILY
Qty: 7 TABLET | Refills: 0 | Status: SHIPPED | OUTPATIENT
Start: 2019-01-29 | End: 2019-01-29 | Stop reason: SDUPTHER

## 2019-01-29 RX ORDER — PREDNISONE 2.5 MG/1
7.5 TABLET ORAL DAILY
Qty: 90 TABLET | Refills: 1 | Status: SHIPPED | OUTPATIENT
Start: 2019-01-29 | End: 2019-02-28

## 2019-01-29 RX ORDER — DEXAMETHASONE SODIUM PHOSPHATE 4 MG/ML
4 INJECTION, SOLUTION INTRA-ARTICULAR; INTRALESIONAL; INTRAMUSCULAR; INTRAVENOUS; SOFT TISSUE
Status: COMPLETED | OUTPATIENT
Start: 2019-01-29 | End: 2019-01-29

## 2019-01-29 RX ADMIN — DEXAMETHASONE SODIUM PHOSPHATE 4 MG: 4 INJECTION, SOLUTION INTRA-ARTICULAR; INTRALESIONAL; INTRAMUSCULAR; INTRAVENOUS; SOFT TISSUE at 09:01

## 2019-01-29 RX ADMIN — KETOROLAC TROMETHAMINE 60 MG: 30 INJECTION, SOLUTION INTRAMUSCULAR; INTRAVENOUS at 09:01

## 2019-01-29 ASSESSMENT — ROUTINE ASSESSMENT OF PATIENT INDEX DATA (RAPID3)
TOTAL RAPID3 SCORE: 2.89
PSYCHOLOGICAL DISTRESS SCORE: 3.3
PATIENT GLOBAL ASSESSMENT SCORE: 5
MDHAQ FUNCTION SCORE: .2
PAIN SCORE: 3

## 2019-01-29 NOTE — PROGRESS NOTES
Administered 2 cc ( 30 mg/ml ) of toradol to the left upper outer gluteal. Informed of s/s to report verbalized understanding. No adverse reactions noted.    Lot # [id number]  Expiration 1 mar 2020    Administered 1 cc dexamethasone 4mg/cc  to right upper outer gluteal. Pt tolerated well. No acute reaction noted to site. Pt instructed on S/S to report. Pt verbalized understanding.     Lot: [id number]   Exp: 11/2019

## 2019-01-29 NOTE — PROGRESS NOTES
"Subjective:       Patient ID: Jen Lazar is a 28 y.o. female.    Chief Complaint: Disease Management    Mrs. Lazar is a 28 year old female who presents to clinic for follow up SLE. She is a new patient to me and was last seen in clinic in July 2018. She has been compliant with plaquenil BID and is taking prednisone 2 times per week on average. She is doing poorly. She complains of constellation of symptoms including fatigue, night sweats, "feeling shaky on the inside", increasing forgetfulness, and joint pain. Arthralgias include hands, R elbow, toes and L ankle. X-ray ankle recently was negative. Suffered a bout of sciatica, which has now resolved. She admits to rash on the R cheek last week; however, this resolved. No dry eyes, dry mouth, oral/nasal ulcers, hairloss, or raynauds. She has been suffering with recurrent sinus/URI infections--none treated with abx (3-4) over the last 6 months and is currently ill. She complains of productive cough, earfulness, thick PND, sinus pressure/pain and is on tamiflu. She has noticed weight gain. Sleep is interrupted by her 10 month child. Recent labs showed BISI was now negative.    Current treatment:  1. Plaquenil 200 mg BID  2. Prednisone 5 mg daily PRN        Review of Systems   Constitutional: Positive for activity change and fatigue. Negative for appetite change, chills and fever.   HENT: Positive for congestion, ear pain, postnasal drip, rhinorrhea, sinus pressure and sinus pain. Negative for sore throat.    Eyes: Negative for visual disturbance.   Respiratory: Positive for cough. Negative for shortness of breath.    Cardiovascular: Negative for chest pain, palpitations and leg swelling.   Gastrointestinal: Negative for abdominal pain, constipation, diarrhea, nausea and vomiting.   Musculoskeletal: Positive for arthralgias.   Skin:        Sweating   Neurological: Negative for dizziness, weakness, light-headedness and headaches.   Hematological: Positive for " adenopathy.   Psychiatric/Behavioral: Positive for sleep disturbance. Negative for dysphoric mood. The patient is not nervous/anxious.          Objective:     Vitals:    01/29/19 0812   BP: 105/64   Pulse: 88       Past Medical History:   Diagnosis Date    Anemia     ASD (atrial septal defect)     s/p device occlusion 12/2016    Bundle branch block, right     Fibromyalgia     pt denies    Ganglion cyst     Headache(784.0)     IBS (irritable bowel syndrome)     Kidney stone     PONV (postoperative nausea and vomiting)     Sinus tachycardia     SLE (systemic lupus erythematosus related syndrome)     Syncope and collapse      Past Surgical History:   Procedure Laterality Date    ARTHROSCOPY, KNEE Right 11/19/2013    [facility]    ASD REPAIR  12/2016    BREAST SURGERY      breast augmentation    CHONDRECTOMY, KNEE, SEMILUNAR CARTILAGE Right 11/19/2013    [facility]    GANGLION CYST EXCISION Left     KNEE ARTHROSCOPY Right     LITHOTRIPSY-EXTRACORPOREAL SHOCK WAVE Right 8/11/2015    [facility]    REPAIR-ATRIAL SEPTAL DEFECT N/A 12/22/2016    Per[facility] at [facility] CATH LAB          Physical Exam   Constitutional: She is well-developed, well-nourished, and in no distress.   HENT:   Nose: Mucosal edema present. No rhinorrhea. Right sinus exhibits no maxillary sinus tenderness and no frontal sinus tenderness. Left sinus exhibits no maxillary sinus tenderness and no frontal sinus tenderness.   Eyes: Right conjunctiva is not injected. Left conjunctiva is not injected. Right eye exhibits normal extraocular motion. Left eye exhibits normal extraocular motion.   Neck: No JVD present. No thyromegaly present.   Cardiovascular: Normal rate and regular rhythm.  Exam reveals no decreased pulses.    Pulmonary/Chest: She has no wheezes. She has no rhonchi. She has no rales.       Right Side  Rheumatological Exam     Examination finds the shoulder, knee, 1st MCP, 2nd MCP, 3rd MCP, 4th PIP, 4th MCP and 5th MCP normal.    The patient is tender to palpation of the elbow, wrist, 2nd PIP, 3rd PIP and 5th PIP    She has swelling of the elbow and wrist    Left Side Rheumatological Exam     Examination finds the shoulder, knee, 1st MCP, 2nd MCP, 3rd MCP, 4th PIP, 4th MCP, 5th PIP and 5th MCP normal.    The patient is tender to palpation of the elbow, wrist, 1st PIP, 2nd PIP and 3rd PIP.    Foot Exam     Ankle Warmth: negative  Ankle Swelling: positive (lateral)      Lymphadenopathy:     She has no cervical adenopathy.   Neurological: Gait normal.   Skin: No rash noted.     Psychiatric: Mood and affect normal.      Lab results: reviewed with patient  Component      Latest Ref Rng & Units 12/13/2018   WBC      3.90 - 12.70 K/uL 7.29   RBC      4.00 - 5.40 M/uL 4.06   Hemoglobin      12.0 - 16.0 g/dL 11.5 (L)   Hematocrit      37.0 - 48.5 % 36.4 (L)   MCV      82 - 98 fL 90   MCH      27.0 - 31.0 pg 28.3   MCHC      32.0 - 36.0 g/dL 31.6 (L)   RDW      11.5 - 14.5 % 13.7   Platelets      150 - 350 K/uL 228   MPV      9.2 - 12.9 fL 11.6   Immature Granulocytes      0.0 - 0.5 % 0.3   Gran # (ANC)      1.8 - 7.7 K/uL 5.1   Immature Grans (Abs)      0.00 - 0.04 K/uL 0.02   Lymph #      1.0 - 4.8 K/uL 1.5   Mono #      0.3 - 1.0 K/uL 0.5   Eos #      0.0 - 0.5 K/uL 0.2   Baso #      0.00 - 0.20 K/uL 0.06   nRBC      0 /100 WBC 0   Gran%      38.0 - 73.0 % 69.4   Lymph%      18.0 - 48.0 % 20.3   Mono%      4.0 - 15.0 % 6.9   Eosinophil%      0.0 - 8.0 % 2.3   Basophil%      0.0 - 1.9 % 0.8   Differential Method       Automated   Sodium      136 - 145 mmol/L 140   Potassium      3.5 - 5.1 mmol/L 3.7   Chloride      95 - 110 mmol/L 106   CO2      23 - 29 mmol/L 27   Glucose      70 - 110 mg/dL 72   BUN, Bld      6 - 20 mg/dL 17   Creatinine      0.5 - 1.4 mg/dL 0.8   Calcium      8.7 - 10.5 mg/dL 9.5   Total Protein       6.0 - 8.4 g/dL 7.4   Albumin      3.5 - 5.2 g/dL 3.9   Total Bilirubin      0.1 - 1.0 mg/dL 0.3   Alkaline Phosphatase      55 - 135 U/L 93   AST      10 - 40 U/L 14   ALT      10 - 44 U/L 9 (L)   Anion Gap      8 - 16 mmol/L 7 (L)   eGFR if African American      >60 mL/min/1.73 m:2 >60.0   eGFR if non African American      >60 mL/min/1.73 m:2 >60.0   Specimen UA       Urine, Clean Catch   Color, UA      Yellow, Straw, Marguerite Yellow   Appearance, UA      Clear Clear   pH, UA      5.0 - 8.0 6.0   Specific Gravity, UA      1.005 - 1.030 >=1.030 (A)   Protein, UA      Negative Negative   Glucose, UA      Negative Negative   Ketones, UA      Negative Trace (A)   Bilirubin (UA)      Negative Negative   Occult Blood UA      Negative Negative   Nitrite, UA      Negative Negative   Leukocytes, UA      Negative Negative   BISI Screen      Negative <1:160 Negative <1:160   CRP      0.0 - 8.2 mg/L 2.9   Sed Rate      0 - 20 mm/Hr 10   Complement (C-3)      50 - 180 mg/dL 83   Complement (C-4)      11 - 44 mg/dL 21   Complement,Total, Serum      42 - 95 U/mL 60     Assessment:       1. Systemic lupus erythematosus, unspecified SLE type, unspecified organ involvement status    2. Long-term use of Plaquenil    3. Recurrent infections    4. Low TSH level    5. Iron deficiency anemia, unspecified iron deficiency anemia type    6. Fatigue, unspecified type    7. Sinusitis, unspecified chronicity, unspecified location            Plan:       Systemic lupus erythematosus, unspecified SLE type, unspecified organ involvement status  -     ketorolac injection 60 mg  -     dexamethasone injection 4 mg  -     predniSONE (DELTASONE) 2.5 MG tablet; Take 3 tablets (7.5 mg total) by mouth once daily.  Dispense: 90 tablet; Refill: 1    Long-term use of Plaquenil    Recurrent infections  -     IgA; Future; Expected date: 01/29/2019  -     IgE; Future; Expected date: 02/12/2019  -     IgG; Future; Expected date: 01/29/2019  -     IgG 1, 2, 3, and 4;  Future; Expected date: 01/29/2019  -     IgM; Future; Expected date: 01/29/2019    Low TSH level  -     TSH; Future; Expected date: 01/29/2019  -     Thyroid peroxidase antibody; Future; Expected date: 01/29/2019  -     Anti-thyroglobulin antibody; Future; Expected date: 01/29/2019  -     T4, free; Future; Expected date: 01/29/2019  -     T3, free; Future; Expected date: 01/29/2019    Iron deficiency anemia, unspecified iron deficiency anemia type  -     Iron and TIBC; Future; Expected date: 01/29/2019  -     Ferritin; Future; Expected date: 01/29/2019    Fatigue, unspecified type  -     Iron and TIBC; Future; Expected date: 01/29/2019  -     Ferritin; Future; Expected date: 01/29/2019    Sinusitis, unspecified chronicity, unspecified location  -     dexamethasone injection 4 mg  -     predniSONE (DELTASONE) 2.5 MG tablet; Take 3 tablets (7.5 mg total) by mouth once daily.  Dispense: 90 tablet; Refill: 1  -     levoFLOXacin (LEVAQUIN) 500 MG tablet; Take 1 tablet (500 mg total) by mouth once daily.  Dispense: 7 tablet; Refill: 0    Assessment:  28 year old female with  SLE characterized by BISI 1:320 speckled pattern (now negative)      Plan: Case discussed with Dr. Mims. Assessment and Plan done in collaboration    1. Check labs today including IgG 1,2,3,4, IgG, IgE, IgM, IgA, repeat TSH and T3/T4 thyroid peroxidase/anti-thyroglobulin, iron levels  2. Start levaquin 500 mg daily x 7 days. Dexamethasone 4 mg given today in clinic. Toradol 60 mg given today in clinic.   3. Start prednisone 2.5 mg (3 tabs po daily)  4. Continue  mg BID. Reminded pt to have annual Ophthalmology exam.     Follow up: 4 months with Dr. Mims, sooner pending lab review

## 2019-01-31 ENCOUNTER — PATIENT MESSAGE (OUTPATIENT)
Dept: RHEUMATOLOGY | Facility: CLINIC | Age: 29
End: 2019-01-31

## 2019-02-01 LAB
IGG1 SER-MCNC: 732 MG/DL
IGG2 SER-MCNC: 409 MG/DL
IGG3 SER-MCNC: 206 MG/DL
IGG4 SER-MCNC: 23 MG/DL

## 2019-02-04 ENCOUNTER — PATIENT MESSAGE (OUTPATIENT)
Dept: CARDIOLOGY | Facility: CLINIC | Age: 29
End: 2019-02-04

## 2019-02-04 DIAGNOSIS — R76.8 THYROGLOBULIN ANTIBODY POSITIVE: Primary | ICD-10-CM

## 2019-02-11 ENCOUNTER — PATIENT MESSAGE (OUTPATIENT)
Dept: FAMILY MEDICINE | Facility: CLINIC | Age: 29
End: 2019-02-11

## 2019-02-13 RX ORDER — DEXTROAMPHETAMINE SACCHARATE, AMPHETAMINE ASPARTATE, DEXTROAMPHETAMINE SULFATE AND AMPHETAMINE SULFATE 2.5; 2.5; 2.5; 2.5 MG/1; MG/1; MG/1; MG/1
1 TABLET ORAL DAILY PRN
Qty: 30 TABLET | Refills: 0 | Status: SHIPPED | OUTPATIENT
Start: 2019-02-13 | End: 2019-03-25 | Stop reason: SDUPTHER

## 2019-03-25 ENCOUNTER — OFFICE VISIT (OUTPATIENT)
Dept: FAMILY MEDICINE | Facility: CLINIC | Age: 29
End: 2019-03-25
Payer: COMMERCIAL

## 2019-03-25 VITALS
RESPIRATION RATE: 16 BRPM | DIASTOLIC BLOOD PRESSURE: 64 MMHG | WEIGHT: 145.06 LBS | HEART RATE: 88 BPM | HEIGHT: 62 IN | SYSTOLIC BLOOD PRESSURE: 116 MMHG | BODY MASS INDEX: 26.69 KG/M2

## 2019-03-25 DIAGNOSIS — M79.672 LEFT FOOT PAIN: ICD-10-CM

## 2019-03-25 DIAGNOSIS — M54.32 SCIATICA OF LEFT SIDE: ICD-10-CM

## 2019-03-25 DIAGNOSIS — F90.2 ADHD (ATTENTION DEFICIT HYPERACTIVITY DISORDER), COMBINED TYPE: Primary | ICD-10-CM

## 2019-03-25 PROCEDURE — 99214 OFFICE O/P EST MOD 30 MIN: CPT | Mod: S$GLB,,, | Performed by: FAMILY MEDICINE

## 2019-03-25 PROCEDURE — 99999 PR PBB SHADOW E&M-EST. PATIENT-LVL III: CPT | Mod: PBBFAC,,, | Performed by: FAMILY MEDICINE

## 2019-03-25 PROCEDURE — 3008F BODY MASS INDEX DOCD: CPT | Mod: CPTII,S$GLB,, | Performed by: FAMILY MEDICINE

## 2019-03-25 PROCEDURE — 99214 PR OFFICE/OUTPT VISIT, EST, LEVL IV, 30-39 MIN: ICD-10-PCS | Mod: S$GLB,,, | Performed by: FAMILY MEDICINE

## 2019-03-25 PROCEDURE — 3008F PR BODY MASS INDEX (BMI) DOCUMENTED: ICD-10-PCS | Mod: CPTII,S$GLB,, | Performed by: FAMILY MEDICINE

## 2019-03-25 PROCEDURE — 99999 PR PBB SHADOW E&M-EST. PATIENT-LVL III: ICD-10-PCS | Mod: PBBFAC,,, | Performed by: FAMILY MEDICINE

## 2019-03-25 RX ORDER — DEXTROAMPHETAMINE SACCHARATE, AMPHETAMINE ASPARTATE, DEXTROAMPHETAMINE SULFATE AND AMPHETAMINE SULFATE 2.5; 2.5; 2.5; 2.5 MG/1; MG/1; MG/1; MG/1
1 TABLET ORAL DAILY PRN
Qty: 30 TABLET | Refills: 0 | Status: SHIPPED | OUTPATIENT
Start: 2019-03-25 | End: 2019-07-03 | Stop reason: SDUPTHER

## 2019-03-25 NOTE — PROGRESS NOTES
Subjective:       Patient ID: Jen Lazar is a 29 y.o. female.    Chief Complaint: ADHD    Here for f/u ADD. meds helping still for concentration. Following with rheum for sle.  Also with left foot pain for last few months.  History of sciatica on left as well.      Review of Systems   Constitutional: Negative for chills, fatigue and fever.   Respiratory: Negative for cough, chest tightness and shortness of breath.    Cardiovascular: Negative for chest pain, palpitations and leg swelling.   Endocrine: Negative for cold intolerance and heat intolerance.   Musculoskeletal: Positive for arthralgias.   Skin: Negative for rash.   Psychiatric/Behavioral: Negative for dysphoric mood. The patient is not nervous/anxious.        Objective:      Physical Exam   Constitutional: She appears well-developed and well-nourished.   HENT:   Head: Normocephalic and atraumatic.   Cardiovascular: Normal rate, regular rhythm and normal heart sounds.   Pulmonary/Chest: Effort normal and breath sounds normal.   Psychiatric: She has a normal mood and affect.   Nursing note and vitals reviewed.      Assessment:       1. ADHD (attention deficit hyperactivity disorder), combined type    2. Left foot pain    3. Sciatica of left side        Plan:       ADHD (attention deficit hyperactivity disorder), combined type    Left foot pain  -     Ambulatory referral to Podiatry    Sciatica of left side  -     X-Ray Lumbar Spine AP And Lateral; Future; Expected date: 03/25/2019    Other orders  -     dextroamphetamine-amphetamine (ADDERALL) 10 mg Tab; Take 1 tablet (10 mg total) by mouth daily as needed.  Dispense: 30 tablet; Refill: 0    f/u with specialists as planned    Follow up in about 4 months (around 7/25/2019), or if symptoms worsen or fail to improve.

## 2019-03-28 ENCOUNTER — HOSPITAL ENCOUNTER (OUTPATIENT)
Dept: RADIOLOGY | Facility: HOSPITAL | Age: 29
Discharge: HOME | End: 2019-03-28
Attending: FAMILY MEDICINE
Payer: COMMERCIAL

## 2019-03-28 DIAGNOSIS — M54.32 SCIATICA OF LEFT SIDE: ICD-10-CM

## 2019-03-28 PROCEDURE — 72100 XR LUMBAR SPINE AP AND LATERAL: ICD-10-PCS | Mod: 26,,, | Performed by: RADIOLOGY

## 2019-03-28 PROCEDURE — 72100 X-RAY EXAM L-S SPINE 2/3 VWS: CPT | Mod: 26,,, | Performed by: RADIOLOGY

## 2019-03-28 PROCEDURE — 72100 X-RAY EXAM L-S SPINE 2/3 VWS: CPT | Mod: TC,FY,PO

## 2019-04-02 ENCOUNTER — OFFICE VISIT (OUTPATIENT)
Dept: ENDOCRINOLOGY | Facility: CLINIC | Age: 29
End: 2019-04-02
Payer: COMMERCIAL

## 2019-04-02 VITALS
DIASTOLIC BLOOD PRESSURE: 68 MMHG | HEART RATE: 76 BPM | WEIGHT: 144.31 LBS | BODY MASS INDEX: 26.56 KG/M2 | SYSTOLIC BLOOD PRESSURE: 108 MMHG | HEIGHT: 62 IN

## 2019-04-02 DIAGNOSIS — R94.6 ABNORMAL THYROID FUNCTION TEST: Primary | ICD-10-CM

## 2019-04-02 DIAGNOSIS — R53.83 FATIGUE, UNSPECIFIED TYPE: ICD-10-CM

## 2019-04-02 PROCEDURE — 99204 OFFICE O/P NEW MOD 45 MIN: CPT | Mod: S$GLB,,, | Performed by: INTERNAL MEDICINE

## 2019-04-02 PROCEDURE — 99999 PR PBB SHADOW E&M-EST. PATIENT-LVL III: ICD-10-PCS | Mod: PBBFAC,,, | Performed by: INTERNAL MEDICINE

## 2019-04-02 PROCEDURE — 3008F BODY MASS INDEX DOCD: CPT | Mod: CPTII,S$GLB,, | Performed by: INTERNAL MEDICINE

## 2019-04-02 PROCEDURE — 3008F PR BODY MASS INDEX (BMI) DOCUMENTED: ICD-10-PCS | Mod: CPTII,S$GLB,, | Performed by: INTERNAL MEDICINE

## 2019-04-02 PROCEDURE — 99204 PR OFFICE/OUTPT VISIT, NEW, LEVL IV, 45-59 MIN: ICD-10-PCS | Mod: S$GLB,,, | Performed by: INTERNAL MEDICINE

## 2019-04-02 PROCEDURE — 99999 PR PBB SHADOW E&M-EST. PATIENT-LVL III: CPT | Mod: PBBFAC,,, | Performed by: INTERNAL MEDICINE

## 2019-04-02 NOTE — LETTER
April 11, 2019      Azam Mims MD  [address] [facility] [address] [place] [address]           [place] - Endocrinology  [address] [facility] [address] [place] [address]  Phone: [phone]  Fax: [phone]          Patient: Jen Lazar   MR Number: [id number]   YOB: 1990   Date of Visit: 4/2/2019       Dear Dr. Azam Mims:    Thank you for referring Jen Lazar to me for evaluation. Attached you will find relevant portions of my assessment and plan of care.    If you have questions, please do not hesitate to call me. I look forward to following Jen Lazar along with you.    Sincerely,    DO Juan Antonio Garsiaosure  CC:  No Recipients    If you would like to receive this communication electronically, please contact [email or web address] or [phone] to request more information on [employer] access.    For providers and/or their staff who would like to refer a patient to [facility], please contact us through our one-stop-shop provider referral line, [facility], at [phone].    If you feel you have received this communication in error or would no longer like to receive these types of communications, please e-mail [email or web address]

## 2019-04-02 NOTE — PROGRESS NOTES
CHIEF COMPLAINT: + Tg Ab/Fatigue  29 year old being seen as a new patient. Recently saw Rheum and found to have + Tg Ab. In July had a mildly suppressed TSH. She is having increased fatigue. Has 3 children at home and concerned about fatigue. Taking prednisone only when a lupus flair. In Jan had a steroid injection. On avg she takes steroid every few weeks. Last time she took steroids in early. States fatigue is regardless of steroids. Has been on steroid off and on since early 2017.       PAST MEDICAL HISTORY/PAST SURGICAL HISTORY:  Reviewed in [facility]    SOCIAL HISTORY: No T/A    FAMILY HISTORY:  No known DM. MGM- RA. No known thyroid disease    MEDICATIONS/ALLERGIES: The patient's MedCard has been updated and reviewed.      ROS:   Constitutional:weight stable.   Eyes: No recent visual changes  ENT: No dysphagia  Cardiovascular: Denies current anginal symptoms  Respiratory: Denies current respiratory difficulty  Gastrointestinal: occasional nausea  GenitoUrinary - No dysuria  Skin: No new skin rash  Neurologic: No focal neurologic complaints  Remainder ROS negative        PE:    GENERAL: Well developed, well nourished.  PSYCH:  appropriate mood and affect  EYES:  PERRL, EOM intact.  ENT: Nares patent, oropharynx clear, mucosa pink,   NECK: Supple, trachea midline, no palpable thyroid nodules.   CHEST: Resp even and unlabored, CTA bilateral.  CARDIAC: RRR, S1, S2 heard, no murmurs, rubs, S3, or S4  ABDOMEN: Soft, non-tender, non-distended;  No organomegaly  VASCULAR:  DP pulses +2/4 bilaterally, no edema  NEURO: Gait steady, CN II-VII grossly intact  SKIN: No areas of breakdown, no acanthosis nigracans.    LABS   Results for RAMÓN SINGLETON (MRN [id number]) as of 4/2/2019 15:04   Ref. Range 6/23/2017 14:19 7/6/2018 14:35 1/29/2019 09:38   TSH Latest Ref Range: 0.400 - 4.000 uIU/mL 0.592 0.366 (L) 0.799   T3, Free Latest Ref Range: 2.3 - 4.2 pg/mL 2.9  2.8   Free T4 Latest Ref Range: 0.71 - 1.51 ng/dL 0.95 0.91 0.90    Thyroperoxidase Antibodies Latest Ref Range: <6.0 IU/mL   <6.0   Thyroglobulin Ab Screen Latest Ref Range: 0.0 - 3.9 IU/mL   68.7 (H)       ASSESSMENT/PLAN:  1. Tg An +- discussed of developing autoimmune thyroid disease. Discussed that currently TFT WNL. Should have a TSH with yearly physical since concern is for primary hypothyroidism.     2. Fatigue- see labs below. Has multiple conditions that could be contributing to fatigue. Had been on steroids in the past so r/o AI.       FOLLOWUP  IgA, TTG, TSH, 8 AM ACTH, cortisol.

## 2019-04-03 ENCOUNTER — LAB VISIT (OUTPATIENT)
Dept: LAB | Facility: HOSPITAL | Age: 29
End: 2019-04-03
Attending: INTERNAL MEDICINE
Payer: COMMERCIAL

## 2019-04-03 DIAGNOSIS — R53.83 FATIGUE, UNSPECIFIED TYPE: ICD-10-CM

## 2019-04-03 DIAGNOSIS — R94.6 ABNORMAL THYROID FUNCTION TEST: ICD-10-CM

## 2019-04-03 LAB
CORTIS SERPL-MCNC: 7.7 UG/DL
IGA SERPL-MCNC: 227 MG/DL (ref 40–350)
TSH SERPL DL<=0.005 MIU/L-ACNC: 0.57 UIU/ML (ref 0.4–4)

## 2019-04-03 PROCEDURE — 83516 IMMUNOASSAY NONANTIBODY: CPT

## 2019-04-03 PROCEDURE — 84443 ASSAY THYROID STIM HORMONE: CPT

## 2019-04-03 PROCEDURE — 82784 ASSAY IGA/IGD/IGG/IGM EACH: CPT

## 2019-04-03 PROCEDURE — 82024 ASSAY OF ACTH: CPT

## 2019-04-03 PROCEDURE — 82533 TOTAL CORTISOL: CPT

## 2019-04-04 ENCOUNTER — PATIENT MESSAGE (OUTPATIENT)
Dept: ENDOCRINOLOGY | Facility: CLINIC | Age: 29
End: 2019-04-04

## 2019-04-04 ENCOUNTER — TELEPHONE (OUTPATIENT)
Dept: ENDOCRINOLOGY | Facility: CLINIC | Age: 29
End: 2019-04-04

## 2019-04-04 ENCOUNTER — OFFICE VISIT (OUTPATIENT)
Dept: PODIATRY | Facility: CLINIC | Age: 29
End: 2019-04-04
Payer: COMMERCIAL

## 2019-04-04 VITALS — HEIGHT: 62 IN | BODY MASS INDEX: 26.57 KG/M2 | WEIGHT: 144.38 LBS

## 2019-04-04 DIAGNOSIS — M25.572 ACUTE LEFT ANKLE PAIN: Primary | ICD-10-CM

## 2019-04-04 DIAGNOSIS — M21.41 PES PLANUS OF BOTH FEET: ICD-10-CM

## 2019-04-04 DIAGNOSIS — M21.42 PES PLANUS OF BOTH FEET: ICD-10-CM

## 2019-04-04 PROCEDURE — 99999 PR PBB SHADOW E&M-EST. PATIENT-LVL II: CPT | Mod: PBBFAC,,, | Performed by: PODIATRIST

## 2019-04-04 PROCEDURE — 99203 PR OFFICE/OUTPT VISIT, NEW, LEVL III, 30-44 MIN: ICD-10-PCS | Mod: S$GLB,,, | Performed by: PODIATRIST

## 2019-04-04 PROCEDURE — 3008F BODY MASS INDEX DOCD: CPT | Mod: CPTII,S$GLB,, | Performed by: PODIATRIST

## 2019-04-04 PROCEDURE — 99999 PR PBB SHADOW E&M-EST. PATIENT-LVL II: ICD-10-PCS | Mod: PBBFAC,,, | Performed by: PODIATRIST

## 2019-04-04 PROCEDURE — 99203 OFFICE O/P NEW LOW 30 MIN: CPT | Mod: S$GLB,,, | Performed by: PODIATRIST

## 2019-04-04 PROCEDURE — 3008F PR BODY MASS INDEX (BMI) DOCUMENTED: ICD-10-PCS | Mod: CPTII,S$GLB,, | Performed by: PODIATRIST

## 2019-04-04 NOTE — LETTER
April 4, 2019      JORDAN Gale MD  [address] [facility] [address] [place] [address]           [place] - Podiatry  [address] [facility] [address] [place] [address]  Phone: [phone]          Patient: Jen Lazar   MR Number: [id number]   YOB: 1990   Date of Visit: 4/4/2019       Dear Dr. JORDAN Gale:    Thank you for referring Jen Lazar to me for evaluation. Attached you will find relevant portions of my assessment and plan of care.    If you have questions, please do not hesitate to call me. I look forward to following Jen Lazar along with you.    Sincerely,    Yogi Martinez, DENISE    Enclosure  CC:  No Recipients    If you would like to receive this communication electronically, please contact [email or web address] or [phone] to request more information on [employer] access.    For providers and/or their staff who would like to refer a patient to [facility], please contact us through our one-stop-shop provider referral line, [facility], at [phone].    If you feel you have received this communication in error or would no longer like to receive these types of communications, please e-mail [email or web address]

## 2019-04-04 NOTE — TELEPHONE ENCOUNTER
Pt informed of Dr. Trejo's message  Pt explained purpose and procedure behind cosyntropin test and verbalized understanding. No steroids used since February  Pt will look at her scheduled to see if available to come in for C-stim on 4/8/19 or 4/9/19

## 2019-04-04 NOTE — TELEPHONE ENCOUNTER
Let her know still awaiting celiac test. Cortisol is not low but not at a level that I can definitely rule out adrenal insufficiency    Would be best to do a cosyntropin stim test

## 2019-04-05 LAB — ACTH PLAS-MCNC: 19 PG/ML (ref 0–46)

## 2019-04-05 NOTE — PROGRESS NOTES
Subjective:      Patient ID: Jen Lazar is a 29 y.o. female.    Chief Complaint: Ankle Pain (left weak dull ache pain) and Foot Pain (left)  Patient presents to clinic with the chief complaint of pain in the Lt. Ankle associated with weakness with weight bearing.  States this has been present for the past year.  Notes more instability when purchasing the ground upon attempting to get out of her SUV.  States symptoms can also occur with prolonged weight bearing.  Describes pain in the ankle as deep aching and rates as a 7/10.  Relates being seen at Urgent Care and told that her x-rays were normal, however, seeking advice as to the etiology of symptoms.  Has not attempted to self treat.  Notes wearing slippers around her home and [employer] walking shoes while out and about.  Denies any additional pedal complaints.    Past Medical History:   Diagnosis Date    Anemia     ASD (atrial septal defect)     s/p device occlusion 12/2016    Bundle branch block, right     Fibromyalgia     pt denies    Ganglion cyst     Headache(784.0)     IBS (irritable bowel syndrome)     Kidney stone     PONV (postoperative nausea and vomiting)     Sinus tachycardia     SLE (systemic lupus erythematosus related syndrome)     Syncope and collapse        Past Surgical History:   Procedure Laterality Date    ARTHROSCOPY, KNEE Right 11/19/2013    Performed [facility] at [facility] OR    ASD REPAIR  12/2016    BREAST SURGERY      breast augmentation    CHONDRECTOMY, KNEE, SEMILUNAR CARTILAGE Right 11/19/2013    Per[facility] at [facility] OR    GANGLION CYST EXCISION Left     KNEE ARTHROSCOPY Right     LITHOTRIPSY-EXTRACORPOREAL SHOCK WAVE Right 8/11/2015    [facility]    REPAIR-ATRIAL SEPTAL DEFECT N/A 12/22/2016    Performed [facility] Skip Crisostomo [facility] at [facility] CATH LAB       Family History   Adopted: Yes   Problem Relation Age of Onset    Anemia Mother      Fainting Mother     Hypertension Father     Arrhythmia Father     Congenital heart disease Daughter         pda    No Known Problems Maternal Grandmother     No Known Problems Maternal Grandfather     No Known Problems Paternal Grandmother     No Known Problems Paternal Grandfather     No Known Problems Maternal Aunt     No Known Problems Maternal Uncle     No Known Problems Paternal Aunt     No Known Problems Paternal Uncle     Asthma Neg Hx     Clotting disorder Neg Hx     Heart attack Neg Hx     Heart disease Neg Hx     Heart failure Neg Hx     Hyperlipidemia Neg Hx     Stroke Neg Hx     Atrial Septal Defect Neg Hx     Pacemaker/defibrilator Neg Hx     Early death Neg Hx        Social History     Socioeconomic History    Marital status:      Spouse name: Not on file    Number of children: 1    Years of education: Not on file    Highest education level: Not on file   Occupational History    Occupation: Housewife   Social Needs    Financial resource strain: Not on file    Food insecurity:     Worry: Not on file     Inability: Not on file    Transportation needs:     Medical: Not on file     Non-medical: Not on file   Tobacco Use    Smoking status: Never Smoker    Smokeless tobacco: Never Used   Substance and Sexual Activity    Alcohol use: No    Drug use: No    Sexual activity: Yes     Partners: Male   Lifestyle    Physical activity:     Days per week: Not on file     Minutes per session: Not on file    Stress: Not on file   Relationships    Social connections:     Talks on phone: Not on file     Gets together: Not on file     Attends Hindu service: Not on file     Active member of club or organization: Not on file     Attends meetings of clubs or organizations: Not on file     Relationship status: Not on file   Other Topics Concern    Not on file   Social History Narrative    Not on file       Current Outpatient Medications   Medication Sig Dispense Refill     acetaminophen (TYLENOL) 325 MG tablet Take 325 mg by mouth every 6 (six) hours as needed for Pain.      dextroamphetamine-amphetamine (ADDERALL) 10 mg Tab Take 1 tablet (10 mg total) by mouth daily as needed. 30 tablet 0    fluticasone (FLONASE) 50 mcg/actuation nasal spray 1 spray (50 mcg total) by Each Nare route once daily. 16 g 0    hydroxychloroquine (PLAQUENIL) 200 mg tablet Take 1 tablet (200 mg total) by mouth 2 (two) times daily. 60 tablet 7    levonorgestrel (MIRENA) 20 mcg/24 hr (5 years) IUD Mirena 20 mcg/24 hr (5 years) intrauterine device   Take by intrauterine route.      predniSONE (DELTASONE) 5 MG tablet prednisone 5 mg tablet PRN       No current facility-administered medications for this visit.        Review of patient's allergies indicates:   Allergen Reactions    Codeine Nausea And Vomiting    Vicodin [hydrocodone-acetaminophen] Nausea And Vomiting       Review of Systems   Constitution: Negative for chills and fever.   Cardiovascular: Negative for claudication and leg swelling.   Skin: Negative for color change and nail changes.   Musculoskeletal: Positive for joint pain. Negative for joint swelling, muscle cramps and muscle weakness.   Neurological: Negative for numbness and paresthesias.   Psychiatric/Behavioral: Negative for altered mental status.           Objective:      Physical Exam   Constitutional: She is oriented to person, place, and time. She appears well-developed and well-nourished. No distress.   Cardiovascular:   Pulses:       Dorsalis pedis pulses are 2+ on the right side, and 2+ on the left side.        Posterior tibial pulses are 2+ on the right side, and 2+ on the left side.   CFT is < 3 seconds bilateral.  Pedal hair growth is present bilateral.  No lower extremity edema noted bilateral.  Toes are warm to touch bilateral.     Musculoskeletal: She exhibits tenderness. She exhibits no edema.   Muscle strength 5/5 in all muscle groups bilateral.  No tenderness nor  crepitation with ROM of foot/ankle joints bilateral.  Pain with palpation to the anterior medial Lt. Ankle gutter.  (-) anterior and posterior drawer sign bilateral.  Bilateral pes planus foot type with collapse of the medial longitudinal arch.  (+) too many toes sign bilateral.  Eversion of bilateral heel while in RCSP.  Mild HAV and semi-reducible hammertoes bilateral.     Neurological: She is alert and oriented to person, place, and time. She has normal strength. No sensory deficit.   Light touch intact bilateral.     Skin: Skin is warm, dry and intact. Capillary refill takes less than 2 seconds. No abrasion, no bruising, no burn, no ecchymosis, no laceration, no lesion, no petechiae and no rash noted. She is not diaphoretic. No erythema. No pallor.   Pedal skin has normal turgor, temperature, and texture bilateral.  Toenails x 10 appear normotrophic. Examination of the skin reveals no evidence of significant maceration, rashes, open lesions, suspicious appearing nevi or other concerning lesions.              Assessment:       Encounter Diagnoses   Name Primary?    Acute left ankle pain Yes    Pes planus of both feet          Plan:       Jen was seen today for ankle pain and foot pain.    Diagnoses and all orders for this visit:    Acute left ankle pain    Pes planus of both feet      I counseled the patient on her conditions, their implications and medical management.    -  Suspect Lt. Ankle pain is secondary to malalignment of foot and ankle secondary to excessive pronation.      - Reviewed most recent x-rays which were negative for signs of trauma or arthritis.    - Recommend wearing supportive shoes or orthopedic sandals only.  Discussed avoidance of barefoot walking, flip flops, and Crocs, as this will exacerbate current symptoms.       - Recommend wearing a pair of OTC insoles.  Given both verbal and written information regarding this.    - May consider applying a topical analgesic (Aspercream,  Biofreeze, or Salonpas) to help with pain symptoms.       - RTC prn or sooner if symptoms fail to resolve.      Yogi Martinez DPM

## 2019-04-08 ENCOUNTER — CLINICAL SUPPORT (OUTPATIENT)
Dept: ENDOCRINOLOGY | Facility: CLINIC | Age: 29
End: 2019-04-08
Payer: COMMERCIAL

## 2019-04-08 DIAGNOSIS — R79.89 ABNORMAL CORTISOL LEVEL: Primary | ICD-10-CM

## 2019-04-08 LAB
CORTIS SERPL-MCNC: 17.9 UG/DL
CORTIS SERPL-MCNC: 20.8 UG/DL
CORTIS SERPL-MCNC: 9.4 UG/DL
TTG IGA SER-ACNC: 12 UNITS

## 2019-04-08 PROCEDURE — 82533 TOTAL CORTISOL: CPT

## 2019-04-08 PROCEDURE — 82024 ASSAY OF ACTH: CPT

## 2019-04-08 RX ORDER — COSYNTROPIN 0.25 MG/ML
0.25 INJECTION, POWDER, FOR SOLUTION INTRAMUSCULAR; INTRAVENOUS ONCE
Status: COMPLETED | OUTPATIENT
Start: 2019-04-08 | End: 2019-04-08

## 2019-04-08 RX ADMIN — COSYNTROPIN 0.25 MG: 0.25 INJECTION, POWDER, FOR SOLUTION INTRAMUSCULAR; INTRAVENOUS at 08:04

## 2019-04-08 NOTE — PROGRESS NOTES
Pt arrived in room at 7:59am and procedure explained. Pt verbalized understanding. IV Intima 22G started (with two previous attempts prior due to poor IV access) in Right AC with baseline labs drawn at 8:47am. Cortrosyn IVP over 2 min at 8:50am, pt tolerated well. IV to left  Labs drawn again at 9:20am and then again 9:50am per protocol.  At 9:57am IV d/c with tip intact. Ambulated out of clinic with belongings. Pt verbalized understanding that Dr. Trejo's office will call to notify of procedure results when available.

## 2019-04-09 ENCOUNTER — TELEPHONE (OUTPATIENT)
Dept: ENDOCRINOLOGY | Facility: CLINIC | Age: 29
End: 2019-04-09

## 2019-04-09 LAB — ACTH PLAS-MCNC: 21 PG/ML (ref 0–46)

## 2019-04-09 NOTE — TELEPHONE ENCOUNTER
Can you let her know that her cortisol levels are normal. No evidence of Adrenal Insufficiency.   Celiac test was normal as well.   With thyroid Ab +, will need TSH test with yearly physical by PCP

## 2019-05-13 ENCOUNTER — PATIENT MESSAGE (OUTPATIENT)
Dept: FAMILY MEDICINE | Facility: CLINIC | Age: 29
End: 2019-05-13

## 2019-05-14 NOTE — TELEPHONE ENCOUNTER
Phoned pt in regards to message per My Chart. Pt is requesting fax number for her doctor she saw in [place] for her foot. Pt states that the doctor in [place] specializes in feet. Pt did not mention doctor's name but is suppose to be giving that particular doctor Dr Gale's fax number to fax what that doctor needed to have ordered due to the pt has medicaid secondary insurance and will need an MRI referral form her primary doctor in order for insurance to pay for the MRI. Fax number given to pt. Pt voiced understanding for appt. CLC

## 2019-05-15 ENCOUNTER — PATIENT MESSAGE (OUTPATIENT)
Dept: FAMILY MEDICINE | Facility: CLINIC | Age: 29
End: 2019-05-15

## 2019-05-20 ENCOUNTER — PATIENT MESSAGE (OUTPATIENT)
Dept: FAMILY MEDICINE | Facility: CLINIC | Age: 29
End: 2019-05-20

## 2019-05-21 ENCOUNTER — TELEPHONE (OUTPATIENT)
Dept: FAMILY MEDICINE | Facility: CLINIC | Age: 29
End: 2019-05-21

## 2019-05-21 DIAGNOSIS — M84.375S STRESS FRACTURE OF LEFT FOOT, SEQUELA: Primary | ICD-10-CM

## 2019-05-30 ENCOUNTER — TELEPHONE (OUTPATIENT)
Dept: FAMILY MEDICINE | Facility: CLINIC | Age: 29
End: 2019-05-30

## 2019-05-30 ENCOUNTER — PATIENT MESSAGE (OUTPATIENT)
Dept: FAMILY MEDICINE | Facility: CLINIC | Age: 29
End: 2019-05-30

## 2019-05-30 NOTE — TELEPHONE ENCOUNTER
----- Message from Dakota Velasco sent at 5/29/2019  2:23 PM CDT -----  Type: Needs Medical Advice    Who Called:  Patient  Best Call Back Number: [phone] (home)   Additional Information: Needs to speak to office regarding MRI that she was suppose to get done at [facility] but due to the mechanism in her heart, she cannot get it done there - please call to advise

## 2019-05-30 NOTE — TELEPHONE ENCOUNTER
Received below message from MRI specialist:   [5/30/2019 10:22 AM]  Ashley Christianson:    No Title   Nory, that device is ok to scan.....not sure why [facility] canceled her.    Spoke with patient and informed. MRI dept scheduled pt 6/1/2019, pt instructed to arrive at 1pm.

## 2019-05-30 NOTE — TELEPHONE ENCOUNTER
"Patient scheduled MRI ankle today but states she received call from [facility] MRI dept stating that due to her cardiac device (cardio form septal occluder) she can't go into their machine; pt was told the device can only handle 720 dose, and the machine at [facility] are 1500 dose.     Per patient, this was not mentioned during previous MRI (2017) and she is "wondering why".    Spoke with [facility] [id number] who stated "we have the same machine as [facility]"   Does the patient have any device implant cards?     Per patient, the serial number number on her implant card is #[id number] reference # [id number]  Patient will also upload card via [facility] (see media tab). Information provided to MRI specialist to research. Awaiting response.    "

## 2019-06-01 ENCOUNTER — HOSPITAL ENCOUNTER (OUTPATIENT)
Dept: RADIOLOGY | Facility: HOSPITAL | Age: 29
Discharge: HOME | End: 2019-06-01
Attending: FAMILY MEDICINE
Payer: COMMERCIAL

## 2019-06-01 DIAGNOSIS — M84.375S STRESS FRACTURE OF LEFT FOOT, SEQUELA: ICD-10-CM

## 2019-06-01 PROCEDURE — 73718 MRI LOWER EXTREMITY W/O DYE: CPT | Mod: TC,PO,LT

## 2019-06-01 PROCEDURE — 73721 MRI JNT OF LWR EXTRE W/O DYE: CPT | Mod: TC,PO,LT

## 2019-06-01 PROCEDURE — 73718 MRI LOWER EXTREMITY W/O DYE: CPT | Mod: 26,LT,, | Performed by: RADIOLOGY

## 2019-06-01 PROCEDURE — 73721 MRI JNT OF LWR EXTRE W/O DYE: CPT | Mod: 26,LT,, | Performed by: RADIOLOGY

## 2019-06-01 PROCEDURE — 73718 MRI FOOT TOES WITHOUT CONTRAST LEFT: ICD-10-PCS | Mod: 26,LT,, | Performed by: RADIOLOGY

## 2019-06-01 PROCEDURE — 73721 MRI ANKLE WITHOUT CONTRAST LEFT: ICD-10-PCS | Mod: 26,LT,, | Performed by: RADIOLOGY

## 2019-06-04 ENCOUNTER — PATIENT MESSAGE (OUTPATIENT)
Dept: FAMILY MEDICINE | Facility: CLINIC | Age: 29
End: 2019-06-04

## 2019-06-13 ENCOUNTER — PATIENT MESSAGE (OUTPATIENT)
Dept: FAMILY MEDICINE | Facility: CLINIC | Age: 29
End: 2019-06-13

## 2019-06-26 ENCOUNTER — OFFICE VISIT (OUTPATIENT)
Dept: RHEUMATOLOGY | Facility: CLINIC | Age: 29
End: 2019-06-26
Payer: COMMERCIAL

## 2019-06-26 VITALS
HEIGHT: 62 IN | BODY MASS INDEX: 25.03 KG/M2 | HEART RATE: 87 BPM | SYSTOLIC BLOOD PRESSURE: 100 MMHG | DIASTOLIC BLOOD PRESSURE: 70 MMHG | WEIGHT: 136 LBS

## 2019-06-26 DIAGNOSIS — G89.29 CHRONIC PAIN OF BOTH KNEES: ICD-10-CM

## 2019-06-26 DIAGNOSIS — M32.9 SYSTEMIC LUPUS ERYTHEMATOSUS, UNSPECIFIED SLE TYPE, UNSPECIFIED ORGAN INVOLVEMENT STATUS: Primary | ICD-10-CM

## 2019-06-26 DIAGNOSIS — M25.571 CHRONIC PAIN OF BOTH ANKLES: ICD-10-CM

## 2019-06-26 DIAGNOSIS — M25.561 CHRONIC PAIN OF BOTH KNEES: ICD-10-CM

## 2019-06-26 DIAGNOSIS — E06.3 HASHIMOTO'S THYROIDITIS: ICD-10-CM

## 2019-06-26 DIAGNOSIS — M54.32 SCIATIC PAIN, LEFT: ICD-10-CM

## 2019-06-26 DIAGNOSIS — M25.572 CHRONIC PAIN OF BOTH ANKLES: ICD-10-CM

## 2019-06-26 DIAGNOSIS — M25.562 CHRONIC PAIN OF BOTH KNEES: ICD-10-CM

## 2019-06-26 DIAGNOSIS — G89.29 CHRONIC PAIN OF BOTH ANKLES: ICD-10-CM

## 2019-06-26 PROCEDURE — 96372 THER/PROPH/DIAG INJ SC/IM: CPT | Mod: S$GLB,,, | Performed by: INTERNAL MEDICINE

## 2019-06-26 PROCEDURE — 99214 OFFICE O/P EST MOD 30 MIN: CPT | Mod: 25,S$GLB,, | Performed by: INTERNAL MEDICINE

## 2019-06-26 PROCEDURE — 99214 PR OFFICE/OUTPT VISIT, EST, LEVL IV, 30-39 MIN: ICD-10-PCS | Mod: 25,S$GLB,, | Performed by: INTERNAL MEDICINE

## 2019-06-26 PROCEDURE — 96372 PR INJECTION,THERAP/PROPH/DIAG2ST, IM OR SUBCUT: ICD-10-PCS | Mod: S$GLB,,, | Performed by: INTERNAL MEDICINE

## 2019-06-26 PROCEDURE — 99999 PR PBB SHADOW E&M-EST. PATIENT-LVL II: ICD-10-PCS | Mod: PBBFAC,,, | Performed by: INTERNAL MEDICINE

## 2019-06-26 PROCEDURE — 99999 PR PBB SHADOW E&M-EST. PATIENT-LVL II: CPT | Mod: PBBFAC,,, | Performed by: INTERNAL MEDICINE

## 2019-06-26 PROCEDURE — 3008F PR BODY MASS INDEX (BMI) DOCUMENTED: ICD-10-PCS | Mod: CPTII,S$GLB,, | Performed by: INTERNAL MEDICINE

## 2019-06-26 PROCEDURE — 3008F BODY MASS INDEX DOCD: CPT | Mod: CPTII,S$GLB,, | Performed by: INTERNAL MEDICINE

## 2019-06-26 RX ORDER — KETOROLAC TROMETHAMINE 30 MG/ML
60 INJECTION, SOLUTION INTRAMUSCULAR; INTRAVENOUS
Status: COMPLETED | OUTPATIENT
Start: 2019-06-26 | End: 2019-06-26

## 2019-06-26 RX ORDER — HYDROXYCHLOROQUINE SULFATE 200 MG/1
200 TABLET, FILM COATED ORAL 2 TIMES DAILY
Qty: 60 TABLET | Refills: 7 | Status: SHIPPED | OUTPATIENT
Start: 2019-06-26 | End: 2021-04-26 | Stop reason: SDUPTHER

## 2019-06-26 RX ORDER — IBUPROFEN AND FAMOTIDINE 26.6; 8 MG/1; MG/1
1 TABLET ORAL 3 TIMES DAILY
Qty: 90 TABLET | Refills: 12 | Status: SHIPPED | OUTPATIENT
Start: 2019-06-26 | End: 2019-07-26

## 2019-06-26 RX ORDER — METHYLPREDNISOLONE 4 MG/1
4 TABLET ORAL
Refills: 0 | COMMUNITY
Start: 2019-06-04 | End: 2019-09-12 | Stop reason: ALTCHOICE

## 2019-06-26 RX ADMIN — KETOROLAC TROMETHAMINE 60 MG: 30 INJECTION, SOLUTION INTRAMUSCULAR; INTRAVENOUS at 05:06

## 2019-06-26 ASSESSMENT — ROUTINE ASSESSMENT OF PATIENT INDEX DATA (RAPID3)
MDHAQ FUNCTION SCORE: 1.3
PSYCHOLOGICAL DISTRESS SCORE: 0
PATIENT GLOBAL ASSESSMENT SCORE: 2
TOTAL RAPID3 SCORE: 3.11
PAIN SCORE: 3

## 2019-06-26 NOTE — PROGRESS NOTES
Subjective:       Patient ID: Jen Lazar is a 29 y.o. female.    Chief Complaint: Lupus (SLE)    Follow  Up: SLE.  plaquenil BID and is taking prednisone 2 times per week on average.  Arthralgias include hands, R elbow, toes and L ankle. X-ray ankle recently was negative. Suffered a bout of sciatica, which has now resolved.  week; however, this resolved. No dry eyes, dry mouth, oral/nasal ulcers, hairloss, or raynauds.    Current treatment:  1. Plaquenil 200 mg BID  2. Prednisone 5 mg daily PRN      Review of Systems   Constitutional: Positive for activity change, chills and fatigue. Negative for appetite change, diaphoresis, fever and unexpected weight change.   HENT: Negative for congestion, dental problem, ear discharge, ear pain, facial swelling, mouth sores, nosebleeds, postnasal drip, rhinorrhea, sinus pressure, sinus pain, sneezing, sore throat, tinnitus, trouble swallowing and voice change.    Eyes: Negative for photophobia, pain, discharge, redness, itching and visual disturbance.   Respiratory: Negative for apnea, cough, chest tightness, shortness of breath and wheezing.    Cardiovascular: Negative for chest pain, palpitations and leg swelling.   Gastrointestinal: Positive for abdominal pain. Negative for abdominal distention, constipation, diarrhea, nausea and vomiting.   Endocrine: Negative for cold intolerance, heat intolerance, polydipsia and polyuria.   Genitourinary: Negative for decreased urine volume, difficulty urinating, dysuria, flank pain, frequency, hematuria and urgency.   Musculoskeletal: Positive for arthralgias, joint swelling, neck pain and neck stiffness. Negative for back pain and gait problem.   Skin: Negative for pallor, rash and wound.        Sweating   Allergic/Immunologic: Negative for immunocompromised state.   Neurological: Positive for weakness. Negative for dizziness, tremors, light-headedness, numbness and headaches.   Hematological: Positive for adenopathy. Does not  bruise/bleed easily.   Psychiatric/Behavioral: Positive for sleep disturbance. Negative for dysphoric mood. The patient is not nervous/anxious.          Objective:     Vitals:    06/26/19 1559   BP: 100/70   Pulse: 87            Physical Exam   Vitals reviewed.  Constitutional: She is oriented to person, place, and time and well-developed, well-nourished, and in no distress.   HENT:   Head: Normocephalic and atraumatic.   Nose: Mucosal edema present. No rhinorrhea. Right sinus exhibits no maxillary sinus tenderness and no frontal sinus tenderness. Left sinus exhibits no maxillary sinus tenderness and no frontal sinus tenderness.   Mouth/Throat: Oropharynx is clear and moist.   Eyes: EOM are normal. Pupils are equal, round, and reactive to light. Right conjunctiva is not injected. Left conjunctiva is not injected. Right eye exhibits normal extraocular motion. Left eye exhibits normal extraocular motion.   Neck: Neck supple. No JVD present. No thyromegaly present.   Cardiovascular: Normal rate, regular rhythm and normal heart sounds.  Exam reveals no gallop, no friction rub and no decreased pulses.    No murmur heard.  Pulmonary/Chest: Breath sounds normal. She has no wheezes. She has no rhonchi. She has no rales. She exhibits no tenderness.   Abdominal: There is no tenderness. There is no rebound and no guarding.       Right Side Rheumatological Exam     Examination finds the shoulder, elbow, knee, 1st MCP, 2nd MCP, 3rd MCP, 4th PIP, 4th MCP and 5th MCP normal.    The patient is tender to palpation of the shoulder, elbow, wrist, knee, 1st PIP, 1st MCP, 2nd PIP, 2nd MCP, 3rd PIP, 3rd MCP, 4th PIP, 4th MCP, 5th PIP and 5th MCP    She has swelling of the elbow and wrist    Shoulder Exam   Tenderness Location: acromion    Range of Motion   Active abduction: abnormal   Adduction: abnormal  Sensation: normal    Knee Exam   Tenderness Location: medial joint line and LCL  Patellofemoral Crepitus: positive  Effusion:  positive  Sensation: normal    Hip Exam   Tenderness Location: posterior and lateral  Sensation: normal    Elbow/Wrist Exam   Tenderness Location: no tenderness  Sensation: normal    Left Side Rheumatological Exam     Examination finds the shoulder, knee, 1st MCP, 2nd MCP, 3rd MCP, 4th PIP, 4th MCP, 5th PIP and 5th MCP normal.    The patient is tender to palpation of the shoulder, elbow, wrist, knee, 1st PIP, 1st MCP, 2nd PIP, 2nd MCP, 3rd PIP, 3rd MCP, 4th PIP, 4th MCP, 5th PIP and 5th MCP.    Shoulder Exam   Tenderness Location: acromion    Range of Motion   Active abduction: abnormal   Sensation: normal    Knee Exam   Tenderness Location: lateral joint line and medial joint line    Patellofemoral Crepitus: positive  Effusion: positive  Sensation: normal    Hip Exam   Tenderness Location: posterior and lateral  Sensation: normal    Elbow/Wrist Exam   Sensation: normal    Foot Exam     Ankle Warmth: negative  Ankle Swelling: positive (lateral)      Back/Neck Exam   Tenderness Right paramedian tenderness of the Upper C-Spine, Upper T-Spine, Upper L-Spine and SI Joint.Left paramedian tenderness of the Upper C-Spine, Upper T-Spine, SI Joint and Upper L-Spine.      Lymphadenopathy:     She has no cervical adenopathy.   Neurological: She is alert and oriented to person, place, and time. Gait normal.   Skin: No rash noted. There is erythema. There is pallor.     Psychiatric: Mood, memory, affect and judgment normal.   Musculoskeletal: She exhibits tenderness and deformity.        Results for orders placed or performed in visit on 04/08/19   60 Minute Cortisol   Result Value Ref Range    Cortisol 20.80 ug/dL   30 Minute Cortisol   Result Value Ref Range    Cortisol 17.90 ug/dL   Baseline Cortisol   Result Value Ref Range    Cortisol 9.40 ug/dL   Baseline ACTH   Result Value Ref Range    ACTH 21 0 - 46 pg/mL       Assessment:       1. Systemic lupus erythematosus, unspecified SLE type, unspecified organ involvement status     2. Chronic pain of both ankles    3. Sciatic pain, left    4. Chronic pain of both knees    5. Hashimoto's thyroiditis            Plan:       Jen was seen today for lupus.    Diagnoses and all orders for this visit:    Systemic lupus erythematosus, unspecified SLE type, unspecified organ involvement status  -     ibuprofen-famotidine (DUEXIS) 800-26.6 mg Tab; Take 1 tablet by mouth 3 (three) times daily.  -     ketorolac injection 60 mg  -     CBC auto differential; Future  -     Comprehensive metabolic panel; Future  -     BISI Screen w/Reflex; Future  -     Sedimentation rate; Future  -     C-reactive protein; Future  -     hydroxychloroquine (PLAQUENIL) 200 mg tablet; Take 1 tablet (200 mg total) by mouth 2 (two) times daily.    Chronic pain of both ankles  -     ibuprofen-famotidine (DUEXIS) 800-26.6 mg Tab; Take 1 tablet by mouth 3 (three) times daily.  -     ketorolac injection 60 mg  -     CBC auto differential; Future  -     Comprehensive metabolic panel; Future  -     BISI Screen w/Reflex; Future  -     Sedimentation rate; Future  -     C-reactive protein; Future    Sciatic pain, left  -     ibuprofen-famotidine (DUEXIS) 800-26.6 mg Tab; Take 1 tablet by mouth 3 (three) times daily.  -     ketorolac injection 60 mg  -     CBC auto differential; Future  -     Comprehensive metabolic panel; Future  -     BISI Screen w/Reflex; Future  -     Sedimentation rate; Future  -     C-reactive protein; Future    Chronic pain of both knees  -     hydroxychloroquine (PLAQUENIL) 200 mg tablet; Take 1 tablet (200 mg total) by mouth 2 (two) times daily.    Hashimoto's thyroiditis  -     Anti-thyroglobulin antibody; Future  -     TSH; Future      Doing fair

## 2019-06-26 NOTE — PROGRESS NOTES
Administered 2 cc  Toradol 30mg/cc  to right upper outer gluteal. Pt tolerated well. No acute reaction noted to site. Pt instructed on S/S to report. Advised patient to wait in lobby 15 minutes after receiving injection to monitor for any reactions. Pt verbalized understanding.     Lot: -[id number]  Exp: [id number]

## 2019-07-03 ENCOUNTER — PATIENT MESSAGE (OUTPATIENT)
Dept: FAMILY MEDICINE | Facility: CLINIC | Age: 29
End: 2019-07-03

## 2019-07-03 RX ORDER — DEXTROAMPHETAMINE SACCHARATE, AMPHETAMINE ASPARTATE, DEXTROAMPHETAMINE SULFATE AND AMPHETAMINE SULFATE 2.5; 2.5; 2.5; 2.5 MG/1; MG/1; MG/1; MG/1
1 TABLET ORAL DAILY PRN
Qty: 30 TABLET | Refills: 0 | Status: SHIPPED | OUTPATIENT
Start: 2019-07-03 | End: 2019-09-12 | Stop reason: SDUPTHER

## 2019-09-05 DIAGNOSIS — F90.2 ADHD (ATTENTION DEFICIT HYPERACTIVITY DISORDER), COMBINED TYPE: Primary | ICD-10-CM

## 2019-09-05 RX ORDER — DEXTROAMPHETAMINE SACCHARATE, AMPHETAMINE ASPARTATE, DEXTROAMPHETAMINE SULFATE AND AMPHETAMINE SULFATE 2.5; 2.5; 2.5; 2.5 MG/1; MG/1; MG/1; MG/1
1 TABLET ORAL DAILY PRN
Qty: 30 TABLET | Refills: 0 | Status: CANCELLED | OUTPATIENT
Start: 2019-09-05

## 2019-09-05 NOTE — TELEPHONE ENCOUNTER
Medication refill requires an appointment.     Patient was advised in 7/3 encounter was needed to continue medication. No appointments have been scheduled.

## 2019-09-12 ENCOUNTER — OFFICE VISIT (OUTPATIENT)
Dept: FAMILY MEDICINE | Facility: CLINIC | Age: 29
End: 2019-09-12
Payer: COMMERCIAL

## 2019-09-12 VITALS
HEIGHT: 62 IN | SYSTOLIC BLOOD PRESSURE: 100 MMHG | DIASTOLIC BLOOD PRESSURE: 78 MMHG | OXYGEN SATURATION: 96 % | BODY MASS INDEX: 25.44 KG/M2 | RESPIRATION RATE: 20 BRPM | WEIGHT: 138.25 LBS | HEART RATE: 86 BPM

## 2019-09-12 DIAGNOSIS — Z00.00 WELLNESS EXAMINATION: Primary | ICD-10-CM

## 2019-09-12 DIAGNOSIS — F90.2 ADHD (ATTENTION DEFICIT HYPERACTIVITY DISORDER), COMBINED TYPE: ICD-10-CM

## 2019-09-12 PROCEDURE — 99395 PR PREVENTIVE VISIT,EST,18-39: ICD-10-PCS | Mod: S$GLB,,, | Performed by: FAMILY MEDICINE

## 2019-09-12 PROCEDURE — 99395 PREV VISIT EST AGE 18-39: CPT | Mod: S$GLB,,, | Performed by: FAMILY MEDICINE

## 2019-09-12 PROCEDURE — 99999 PR PBB SHADOW E&M-EST. PATIENT-LVL III: CPT | Mod: PBBFAC,,, | Performed by: FAMILY MEDICINE

## 2019-09-12 PROCEDURE — 99999 PR PBB SHADOW E&M-EST. PATIENT-LVL III: ICD-10-PCS | Mod: PBBFAC,,, | Performed by: FAMILY MEDICINE

## 2019-09-12 RX ORDER — DEXTROAMPHETAMINE SACCHARATE, AMPHETAMINE ASPARTATE, DEXTROAMPHETAMINE SULFATE AND AMPHETAMINE SULFATE 2.5; 2.5; 2.5; 2.5 MG/1; MG/1; MG/1; MG/1
1 TABLET ORAL DAILY PRN
Qty: 30 TABLET | Refills: 0 | Status: SHIPPED | OUTPATIENT
Start: 2019-09-12 | End: 2019-11-18 | Stop reason: SDUPTHER

## 2019-09-12 RX ORDER — IBUPROFEN AND FAMOTIDINE 26.6; 8 MG/1; MG/1
1 TABLET ORAL
COMMUNITY
End: 2021-06-09 | Stop reason: SDUPTHER

## 2019-09-12 NOTE — PROGRESS NOTES
Subjective:       Patient ID: Jen Lazar is a 29 y.o. female.    Chief Complaint: Medication Refill    Here for wellness and f/u ADD. Med helping with  job and takes prn. Doing well overall and following with rheum still.    Review of Systems   Constitutional: Negative for chills, fatigue and fever.   Respiratory: Negative for cough, chest tightness and shortness of breath.    Cardiovascular: Negative for chest pain, palpitations and leg swelling.   Endocrine: Negative for cold intolerance and heat intolerance.   Skin: Negative for rash.   Psychiatric/Behavioral: Negative for dysphoric mood. The patient is not nervous/anxious.        Objective:      Physical Exam   Constitutional: She appears well-developed and well-nourished.   HENT:   Head: Normocephalic and atraumatic.   Cardiovascular: Normal rate, regular rhythm and normal heart sounds.   Pulmonary/Chest: Effort normal and breath sounds normal.   Psychiatric: She has a normal mood and affect.   Nursing note and vitals reviewed.      Assessment:       1. Wellness examination    2. ADHD (attention deficit hyperactivity disorder), combined type        Plan:       Wellness examination  -     Lipid panel; Future; Expected date: 09/12/2019    ADHD (attention deficit hyperactivity disorder), combined type    Other orders  -     dextroamphetamine-amphetamine (ADDERALL) 10 mg Tab; Take 1 tablet (10 mg total) by mouth daily as needed.  Dispense: 30 tablet; Refill: 0      Refill med. Update lipid with rheum lab draw  Ok in  database.  Will monitor chronic medical issues and continue current plan of care.  Ok for 6 month follow up.    Follow up in about 6 months (around 3/12/2020), or if symptoms worsen or fail to improve.

## 2019-11-07 ENCOUNTER — OFFICE VISIT (OUTPATIENT)
Dept: FAMILY MEDICINE | Facility: CLINIC | Age: 29
End: 2019-11-07
Payer: COMMERCIAL

## 2019-11-07 VITALS
OXYGEN SATURATION: 98 % | SYSTOLIC BLOOD PRESSURE: 100 MMHG | WEIGHT: 138.25 LBS | DIASTOLIC BLOOD PRESSURE: 68 MMHG | TEMPERATURE: 98 F | HEART RATE: 85 BPM | HEIGHT: 62 IN | BODY MASS INDEX: 25.44 KG/M2

## 2019-11-07 DIAGNOSIS — M79.10 MUSCLE PAIN: Primary | ICD-10-CM

## 2019-11-07 PROCEDURE — 99214 PR OFFICE/OUTPT VISIT, EST, LEVL IV, 30-39 MIN: ICD-10-PCS | Mod: 25,S$GLB,, | Performed by: NURSE PRACTITIONER

## 2019-11-07 PROCEDURE — 99214 OFFICE O/P EST MOD 30 MIN: CPT | Mod: 25,S$GLB,, | Performed by: NURSE PRACTITIONER

## 2019-11-07 PROCEDURE — 99999 PR PBB SHADOW E&M-EST. PATIENT-LVL IV: ICD-10-PCS | Mod: PBBFAC,,, | Performed by: NURSE PRACTITIONER

## 2019-11-07 PROCEDURE — 3008F PR BODY MASS INDEX (BMI) DOCUMENTED: ICD-10-PCS | Mod: CPTII,S$GLB,, | Performed by: NURSE PRACTITIONER

## 2019-11-07 PROCEDURE — 90471 FLU VACCINE (QUAD) GREATER THAN OR EQUAL TO 3YO PRESERVATIVE FREE IM: ICD-10-PCS | Mod: S$GLB,,, | Performed by: NURSE PRACTITIONER

## 2019-11-07 PROCEDURE — 90686 FLU VACCINE (QUAD) GREATER THAN OR EQUAL TO 3YO PRESERVATIVE FREE IM: ICD-10-PCS | Mod: S$GLB,,, | Performed by: NURSE PRACTITIONER

## 2019-11-07 PROCEDURE — 99999 PR PBB SHADOW E&M-EST. PATIENT-LVL IV: CPT | Mod: PBBFAC,,, | Performed by: NURSE PRACTITIONER

## 2019-11-07 PROCEDURE — 3008F BODY MASS INDEX DOCD: CPT | Mod: CPTII,S$GLB,, | Performed by: NURSE PRACTITIONER

## 2019-11-07 PROCEDURE — 90471 IMMUNIZATION ADMIN: CPT | Mod: S$GLB,,, | Performed by: NURSE PRACTITIONER

## 2019-11-07 PROCEDURE — 90686 IIV4 VACC NO PRSV 0.5 ML IM: CPT | Mod: S$GLB,,, | Performed by: NURSE PRACTITIONER

## 2019-11-07 NOTE — PATIENT INSTRUCTIONS
Take the duexis regularly.  Ice or heat to the area.  Try some muscle rub.    Splint the area and cough and deep breath 10x at least 4x daily.  If you get fever, cough up yellow sputum or start to feel sick, please call me or Dr. Gale's team.  If the pain persists, contact Dr. Mims.    If you have any questions, please call.  You can reach us at [phone] Monday through Thursday (except holidays) 8:30 a.m. to 5 p.m. or call Dr. Gale/ISMAEL Avila     Note:  I do not work on Fridays.  So if you have concerns or questions, please contact your primary care provider team or [facility] or go to the Urgent Care on Friday for concerns.     Thank you for using our Primary Care Service!    ION Johns, CNP, FNP-BC [facility]    To rate your experience with JONO Johns, click on the link below:      [email or web address]

## 2019-11-07 NOTE — Clinical Note
W Gabo Gale MD,  I saw your patient today in Primary Care  If you have any questions, please do not hesitate to contact me.  Thank you!  JONO Johns, [facility]

## 2019-11-07 NOTE — PROGRESS NOTES
Jen Lazar is a 29 y.o. female patient of FADUMO Gale MD who presents to the clinic today for   Chief Complaint   Patient presents with    Shortness of Breath   .    HPI    Pt, who is known to me, reports a new problem to me: right upper chest (ant to shoulder) pain with coughing, deep breathing, movement, occ radiating to the arm.     These symptoms began 4 days ago and status is worsening.  Initially was intermittent, now constant..     Symptoms are + acute.    Pt denies the following symptoms:  Dyspnea, unusual activity    Aggravating factors include movement, deep breathing, coughing, talking    Relieving factors include nothing .  Tried hot shower and ibuprofen    OTC Medications tried are ibuprofen.    Prescription medications taken for symptoms are Duexis.    Pertinent medical history:  H/o Lupus--has ulcer in her mouth, feeling very fatigued and feeling like she may be having a Lupus flare up.  Took 1 tablet once (last night or the night before).  Rx is for 2.5 mg up to 5 tablets until the sxs resolve.    Smoking status:  nonsmoker    ROS    Constitutional:   No  fever, ++ fatigue, no change in appetite.    Head:   No headache  Ears:   No pain.  Eyes:  No sxs  Nose:   No sinus pain.  Throat:  Scratchy throat but no ST pain.    Heart:  No palpitations, chest pain.  Pain she has had CP r/t heart but this is different.    Lungs:  No difficulty breathing, sometimes is coughing.    GI/:  No sxs    MS:  Right upper chest pain.  No other new pains r/t rheumatological disorders.    Skin:  No rashes, itching.      Past Medical History:   Diagnosis Date    Anemia     ASD (atrial septal defect)     s/p device occlusion 12/2016    Bundle branch block, right     Fibromyalgia     pt denies    Ganglion cyst     Headache(784.0)     IBS (irritable bowel syndrome)     Kidney stone     PONV (postoperative nausea and vomiting)     Sinus tachycardia     SLE (systemic lupus erythematosus related  syndrome)     Syncope and collapse        Current Outpatient Medications:     acetaminophen (TYLENOL) 325 MG tablet, Take 325 mg by mouth every 6 (six) hours as needed for Pain., Disp: , Rfl:     dextroamphetamine-amphetamine (ADDERALL) 10 mg Tab, Take 1 tablet (10 mg total) by mouth daily as needed., Disp: 30 tablet, Rfl: 0    fluticasone (FLONASE) 50 mcg/actuation nasal spray, 1 spray (50 mcg total) by Each Nare route once daily., Disp: 16 g, Rfl: 0    hydroxychloroquine (PLAQUENIL) 200 mg tablet, Take 1 tablet (200 mg total) by mouth 2 (two) times daily., Disp: 60 tablet, Rfl: 7    ibuprofen-famotidine (DUEXIS) 800-26.6 mg Tab, Take 1 tablet by mouth after meals as needed., Disp: , Rfl:     levonorgestrel (MIRENA) 20 mcg/24 hr (5 years) IUD, Mirena 20 mcg/24 hr (5 years) intrauterine device  Take by intrauterine route., Disp: , Rfl:     Patient is not a smoker.    PHYSICAL EXAM    Alert, coop 29 y.o. female patient in no acute distress.    Vitals:    11/07/19 1103   BP: 100/68   Pulse: 85   Temp: 98.4 °F (36.9 °C)     VS reviewed.  VS stable.  CC, nursing note, medications & PMH all reviewed today.    Head:  Normocephalic, atraumatic.    EENT:  Ext nose/ears normal.  Eyes with normal lids, not injected.             Resp:  Respirations even, unlabored.  Full and symmetrical resp excursion.   Lungs CTA bilat.  No wheezing.  No crackles.  Moves air to bases bilat.    Heart:  RRR, no murmur.    MS:  Ambulates independently.          The ant shoulder of the right UE is/are noted to have no edema, no erythema, no ecchymosis.  The affected area is tender to palp.  Pain elicited with palpation, ROM, deep breathing .  Pain is not noted in the AC joint(s) but medial to it, under the pectoral muscle..  ROM of the affected area is intact.  ROM of the UEs is symmetrical.  Strength with abduction/adducion of the UEs is 5/5 and is symmetrical.   are strong and symmetrical.  The other joints and areas of the UE are  without erythema, edema, ecchymosis or pain.    NEURO:  Alert and oriented x 4.  Responds appropriately during interaction.                  Biceps reflexes 3+ bilat.                   Sensation to light touch intact over the UEs bilat.    Skin:  Warm, dry, color good.    Muscle pain  Comments:  right upper chest      Other orders  -     Influenza - Quadrivalent (PF)      Pt today presents with report of pain in the muscles ant to the AC joint without any known injury.  Se does have Lupus and feels she may be about to have a flare up.  She took 2.5 mg prednisone x 1 tablet 2 days ago.  ROM, strength are intact to UEs/shoulders bilat.  Resp excursion full and symmetrical.    This is a new problem to me.  No work up is planned.        Pt advised to perform comfort measures recommended on patient instruction sheet .    If worse or concerns, the patient is advised to call us or notify Dr. Mims.  Explained exam findings, diagnosis and treatment plan to patient.  Questions answered and patient states understanding.

## 2019-11-09 ENCOUNTER — PATIENT MESSAGE (OUTPATIENT)
Dept: RHEUMATOLOGY | Facility: CLINIC | Age: 29
End: 2019-11-09

## 2019-11-12 ENCOUNTER — TELEPHONE (OUTPATIENT)
Dept: RHEUMATOLOGY | Facility: CLINIC | Age: 29
End: 2019-11-12

## 2019-11-12 DIAGNOSIS — M32.9 SYSTEMIC LUPUS ERYTHEMATOSUS, UNSPECIFIED SLE TYPE, UNSPECIFIED ORGAN INVOLVEMENT STATUS: ICD-10-CM

## 2019-11-12 DIAGNOSIS — R07.9 CHEST PAIN, UNSPECIFIED TYPE: Primary | ICD-10-CM

## 2019-11-13 ENCOUNTER — HOSPITAL ENCOUNTER (OUTPATIENT)
Dept: RADIOLOGY | Facility: HOSPITAL | Age: 29
Discharge: HOME | End: 2019-11-13
Attending: INTERNAL MEDICINE
Payer: COMMERCIAL

## 2019-11-13 DIAGNOSIS — R07.9 CHEST PAIN, UNSPECIFIED TYPE: ICD-10-CM

## 2019-11-13 DIAGNOSIS — M32.9 SYSTEMIC LUPUS ERYTHEMATOSUS, UNSPECIFIED SLE TYPE, UNSPECIFIED ORGAN INVOLVEMENT STATUS: ICD-10-CM

## 2019-11-13 PROCEDURE — 71046 X-RAY EXAM CHEST 2 VIEWS: CPT | Mod: TC,FY,PO

## 2019-11-13 PROCEDURE — 71046 XR CHEST PA AND LATERAL: ICD-10-PCS | Mod: 26,,, | Performed by: RADIOLOGY

## 2019-11-13 PROCEDURE — 71046 X-RAY EXAM CHEST 2 VIEWS: CPT | Mod: 26,,, | Performed by: RADIOLOGY

## 2019-11-18 RX ORDER — DEXTROAMPHETAMINE SACCHARATE, AMPHETAMINE ASPARTATE, DEXTROAMPHETAMINE SULFATE AND AMPHETAMINE SULFATE 2.5; 2.5; 2.5; 2.5 MG/1; MG/1; MG/1; MG/1
1 TABLET ORAL DAILY PRN
Qty: 30 TABLET | Refills: 0 | Status: SHIPPED | OUTPATIENT
Start: 2019-11-18 | End: 2020-01-27 | Stop reason: SDUPTHER

## 2019-11-21 ENCOUNTER — HOSPITAL ENCOUNTER (OUTPATIENT)
Dept: RADIOLOGY | Facility: HOSPITAL | Age: 29
Discharge: HOME | End: 2019-11-21
Attending: INTERNAL MEDICINE
Payer: COMMERCIAL

## 2019-11-21 DIAGNOSIS — R07.9 CHEST PAIN, UNSPECIFIED TYPE: ICD-10-CM

## 2019-11-21 DIAGNOSIS — M32.9 SYSTEMIC LUPUS ERYTHEMATOSUS, UNSPECIFIED SLE TYPE, UNSPECIFIED ORGAN INVOLVEMENT STATUS: ICD-10-CM

## 2019-11-21 PROCEDURE — 76604 US EXAM CHEST: CPT | Mod: TC,PO

## 2019-11-21 PROCEDURE — 76604 US SOFT TISSUE CHEST_UPPER BACK: ICD-10-PCS | Mod: 26,,, | Performed by: RADIOLOGY

## 2019-11-21 PROCEDURE — 76604 US EXAM CHEST: CPT | Mod: 26,,, | Performed by: RADIOLOGY

## 2019-11-24 ENCOUNTER — PATIENT MESSAGE (OUTPATIENT)
Dept: RHEUMATOLOGY | Facility: CLINIC | Age: 29
End: 2019-11-24

## 2019-12-06 ENCOUNTER — CLINICAL SUPPORT (OUTPATIENT)
Dept: CARDIOLOGY | Facility: CLINIC | Age: 29
End: 2019-12-06
Attending: INTERNAL MEDICINE
Payer: COMMERCIAL

## 2019-12-06 VITALS — BODY MASS INDEX: 25.4 KG/M2 | WEIGHT: 138 LBS | HEIGHT: 62 IN

## 2019-12-06 DIAGNOSIS — R07.9 CHEST PAIN, UNSPECIFIED TYPE: ICD-10-CM

## 2019-12-06 DIAGNOSIS — M32.9 SYSTEMIC LUPUS ERYTHEMATOSUS, UNSPECIFIED SLE TYPE, UNSPECIFIED ORGAN INVOLVEMENT STATUS: ICD-10-CM

## 2019-12-06 LAB
AV INDEX (PROSTH): 0.97
AV MEAN GRADIENT: 3 MMHG
AV PEAK GRADIENT: 6 MMHG
AV VALVE AREA: 3.28 CM2
AV VELOCITY RATIO: 0.88
BSA FOR ECHO PROCEDURE: 1.65 M2
CV ECHO LV RWT: 0.22 CM
DOP CALC AO PEAK VEL: 1.22 M/S
DOP CALC AO VTI: 22.04 CM
DOP CALC LVOT AREA: 3.4 CM2
DOP CALC LVOT DIAMETER: 2.08 CM
DOP CALC LVOT PEAK VEL: 1.07 M/S
DOP CALC LVOT STROKE VOLUME: 72.27 CM3
DOP CALCLVOT PEAK VEL VTI: 21.28 CM
E WAVE DECELERATION TIME: 142.12 MSEC
E/A RATIO: 1.55
E/E' RATIO: 7.06 M/S
ECHO LV POSTERIOR WALL: 0.51 CM (ref 0.6–1.1)
FRACTIONAL SHORTENING: 33 % (ref 28–44)
INTERVENTRICULAR SEPTUM: 0.58 CM (ref 0.6–1.1)
LA MAJOR: 3.3 CM
LA MINOR: 3.05 CM
LA WIDTH: 3.42 CM
LEFT ATRIUM SIZE: 3.02 CM
LEFT ATRIUM VOLUME INDEX: 17 ML/M2
LEFT ATRIUM VOLUME: 27.83 CM3
LEFT INTERNAL DIMENSION IN SYSTOLE: 3.1 CM (ref 2.1–4)
LEFT VENTRICLE DIASTOLIC VOLUME INDEX: 60.31 ML/M2
LEFT VENTRICLE DIASTOLIC VOLUME: 98.47 ML
LEFT VENTRICLE MASS INDEX: 45 G/M2
LEFT VENTRICLE SYSTOLIC VOLUME INDEX: 23.3 ML/M2
LEFT VENTRICLE SYSTOLIC VOLUME: 38.02 ML
LEFT VENTRICULAR INTERNAL DIMENSION IN DIASTOLE: 4.62 CM (ref 3.5–6)
LEFT VENTRICULAR MASS: 73.45 G
LV LATERAL E/E' RATIO: 5.95 M/S
LV SEPTAL E/E' RATIO: 8.69 M/S
MV PEAK A VEL: 0.73 M/S
MV PEAK E VEL: 1.13 M/S
PISA TR MAX VEL: 2.64 M/S
PULM VEIN S/D RATIO: 0.77
PV PEAK D VEL: 0.65 M/S
PV PEAK S VEL: 0.5 M/S
RA MAJOR: 3.2 CM
RA PRESSURE: 3 MMHG
RA WIDTH: 3.13 CM
SINUS: 3.01 CM
STJ: 2.74 CM
TDI LATERAL: 0.19 M/S
TDI SEPTAL: 0.13 M/S
TDI: 0.16 M/S
TR MAX PG: 28 MMHG
TRICUSPID ANNULAR PLANE SYSTOLIC EXCURSION: 1.88 CM
TV REST PULMONARY ARTERY PRESSURE: 31 MMHG

## 2019-12-06 PROCEDURE — 93306 ECHO (CUPID ONLY): ICD-10-PCS | Mod: S$GLB,,, | Performed by: INTERNAL MEDICINE

## 2019-12-06 PROCEDURE — 93306 TTE W/DOPPLER COMPLETE: CPT | Mod: S$GLB,,, | Performed by: INTERNAL MEDICINE

## 2019-12-06 PROCEDURE — 99999 PR PBB SHADOW E&M-EST. PATIENT-LVL I: ICD-10-PCS | Mod: PBBFAC,,,

## 2019-12-06 PROCEDURE — 99999 PR PBB SHADOW E&M-EST. PATIENT-LVL I: CPT | Mod: PBBFAC,,,

## 2019-12-09 ENCOUNTER — TELEPHONE (OUTPATIENT)
Dept: RHEUMATOLOGY | Facility: CLINIC | Age: 29
End: 2019-12-09

## 2019-12-09 NOTE — TELEPHONE ENCOUNTER
----- Message from Nirmal Ross sent at 12/9/2019  8:43 AM CST -----  Contact: pt  Type: Needs Medical Advice    Who Called:  pt    Best Call Back Number: [phone]  Additional Information: Pt would like to go over her ECHO results. States she doesn't understand them. Please call to advise.

## 2019-12-11 ENCOUNTER — TELEPHONE (OUTPATIENT)
Dept: PODIATRY | Facility: CLINIC | Age: 29
End: 2019-12-11

## 2019-12-11 NOTE — TELEPHONE ENCOUNTER
----- Message from Hailee Valenzuela sent at 12/11/2019  1:38 PM CST -----  Type:  Same Day Appointment Request    Caller is requesting a same day appointment.  Caller declined first available appointment listed below.      Name of Caller:  Patient   When is the first available appointment?  01/16/19  Symptoms:  Possible ingrown toe nail, red swollen, possible infection  Best Call Back Number:  [phone]  Additional Information:

## 2019-12-12 ENCOUNTER — LAB VISIT (OUTPATIENT)
Dept: LAB | Facility: HOSPITAL | Age: 29
End: 2019-12-12
Attending: INTERNAL MEDICINE
Payer: COMMERCIAL

## 2019-12-12 DIAGNOSIS — M25.572 CHRONIC PAIN OF BOTH ANKLES: ICD-10-CM

## 2019-12-12 DIAGNOSIS — M25.571 CHRONIC PAIN OF BOTH ANKLES: ICD-10-CM

## 2019-12-12 DIAGNOSIS — Z00.00 WELLNESS EXAMINATION: ICD-10-CM

## 2019-12-12 DIAGNOSIS — G89.29 CHRONIC PAIN OF BOTH ANKLES: ICD-10-CM

## 2019-12-12 DIAGNOSIS — M54.32 SCIATIC PAIN, LEFT: ICD-10-CM

## 2019-12-12 DIAGNOSIS — M32.9 SYSTEMIC LUPUS ERYTHEMATOSUS, UNSPECIFIED SLE TYPE, UNSPECIFIED ORGAN INVOLVEMENT STATUS: ICD-10-CM

## 2019-12-12 DIAGNOSIS — E06.3 HASHIMOTO'S THYROIDITIS: ICD-10-CM

## 2019-12-12 PROCEDURE — 86140 C-REACTIVE PROTEIN: CPT

## 2019-12-12 PROCEDURE — 86800 THYROGLOBULIN ANTIBODY: CPT

## 2019-12-12 PROCEDURE — 36415 COLL VENOUS BLD VENIPUNCTURE: CPT | Mod: PO

## 2019-12-12 PROCEDURE — 85651 RBC SED RATE NONAUTOMATED: CPT | Mod: PO

## 2019-12-12 PROCEDURE — 85025 COMPLETE CBC W/AUTO DIFF WBC: CPT

## 2019-12-12 PROCEDURE — 80061 LIPID PANEL: CPT

## 2019-12-12 PROCEDURE — 86038 ANTINUCLEAR ANTIBODIES: CPT

## 2019-12-12 PROCEDURE — 84443 ASSAY THYROID STIM HORMONE: CPT

## 2019-12-12 PROCEDURE — 80053 COMPREHEN METABOLIC PANEL: CPT

## 2019-12-13 LAB
ALBUMIN SERPL BCP-MCNC: 4 G/DL (ref 3.5–5.2)
ALP SERPL-CCNC: 65 U/L (ref 55–135)
ALT SERPL W/O P-5'-P-CCNC: 8 U/L (ref 10–44)
ANION GAP SERPL CALC-SCNC: 5 MMOL/L (ref 8–16)
AST SERPL-CCNC: 13 U/L (ref 10–40)
BASOPHILS # BLD AUTO: 0.05 K/UL (ref 0–0.2)
BASOPHILS NFR BLD: 1 % (ref 0–1.9)
BILIRUB SERPL-MCNC: 0.6 MG/DL (ref 0.1–1)
BUN SERPL-MCNC: 13 MG/DL (ref 6–20)
CALCIUM SERPL-MCNC: 9.3 MG/DL (ref 8.7–10.5)
CHLORIDE SERPL-SCNC: 108 MMOL/L (ref 95–110)
CHOLEST SERPL-MCNC: 159 MG/DL (ref 120–199)
CHOLEST/HDLC SERPL: 2.2 {RATIO} (ref 2–5)
CO2 SERPL-SCNC: 28 MMOL/L (ref 23–29)
CREAT SERPL-MCNC: 0.8 MG/DL (ref 0.5–1.4)
CRP SERPL-MCNC: 0.8 MG/L (ref 0–8.2)
DIFFERENTIAL METHOD: ABNORMAL
EOSINOPHIL # BLD AUTO: 0.2 K/UL (ref 0–0.5)
EOSINOPHIL NFR BLD: 3.1 % (ref 0–8)
ERYTHROCYTE [DISTWIDTH] IN BLOOD BY AUTOMATED COUNT: 12.4 % (ref 11.5–14.5)
ERYTHROCYTE [SEDIMENTATION RATE] IN BLOOD BY WESTERGREN METHOD: 5 MM/HR (ref 0–20)
EST. GFR  (AFRICAN AMERICAN): >60 ML/MIN/1.73 M^2
EST. GFR  (NON AFRICAN AMERICAN): >60 ML/MIN/1.73 M^2
GLUCOSE SERPL-MCNC: 88 MG/DL (ref 70–110)
HCT VFR BLD AUTO: 39.4 % (ref 37–48.5)
HDLC SERPL-MCNC: 73 MG/DL (ref 40–75)
HDLC SERPL: 45.9 % (ref 20–50)
HGB BLD-MCNC: 12.3 G/DL (ref 12–16)
IMM GRANULOCYTES # BLD AUTO: 0.01 K/UL (ref 0–0.04)
IMM GRANULOCYTES NFR BLD AUTO: 0.2 % (ref 0–0.5)
LDLC SERPL CALC-MCNC: 76 MG/DL (ref 63–159)
LYMPHOCYTES # BLD AUTO: 1.2 K/UL (ref 1–4.8)
LYMPHOCYTES NFR BLD: 25.1 % (ref 18–48)
MCH RBC QN AUTO: 28.4 PG (ref 27–31)
MCHC RBC AUTO-ENTMCNC: 31.2 G/DL (ref 32–36)
MCV RBC AUTO: 91 FL (ref 82–98)
MONOCYTES # BLD AUTO: 0.3 K/UL (ref 0.3–1)
MONOCYTES NFR BLD: 6.9 % (ref 4–15)
NEUTROPHILS # BLD AUTO: 3.1 K/UL (ref 1.8–7.7)
NEUTROPHILS NFR BLD: 63.7 % (ref 38–73)
NONHDLC SERPL-MCNC: 86 MG/DL
NRBC BLD-RTO: 0 /100 WBC
PLATELET # BLD AUTO: 210 K/UL (ref 150–350)
PMV BLD AUTO: 10.8 FL (ref 9.2–12.9)
POTASSIUM SERPL-SCNC: 3.8 MMOL/L (ref 3.5–5.1)
PROT SERPL-MCNC: 7.3 G/DL (ref 6–8.4)
RBC # BLD AUTO: 4.33 M/UL (ref 4–5.4)
SODIUM SERPL-SCNC: 141 MMOL/L (ref 136–145)
THYROGLOB AB SERPL IA-ACNC: 45.7 IU/ML (ref 0–3.9)
TRIGL SERPL-MCNC: 50 MG/DL (ref 30–150)
TSH SERPL DL<=0.005 MIU/L-ACNC: 0.61 UIU/ML (ref 0.4–4)
WBC # BLD AUTO: 4.79 K/UL (ref 3.9–12.7)

## 2019-12-17 ENCOUNTER — TELEPHONE (OUTPATIENT)
Dept: RHEUMATOLOGY | Facility: CLINIC | Age: 29
End: 2019-12-17

## 2019-12-17 ENCOUNTER — OFFICE VISIT (OUTPATIENT)
Dept: RHEUMATOLOGY | Facility: CLINIC | Age: 29
End: 2019-12-17
Payer: COMMERCIAL

## 2019-12-17 VITALS
HEART RATE: 76 BPM | WEIGHT: 141.13 LBS | HEIGHT: 62 IN | DIASTOLIC BLOOD PRESSURE: 69 MMHG | BODY MASS INDEX: 25.97 KG/M2 | SYSTOLIC BLOOD PRESSURE: 104 MMHG

## 2019-12-17 DIAGNOSIS — M32.9 SYSTEMIC LUPUS ERYTHEMATOSUS, UNSPECIFIED SLE TYPE, UNSPECIFIED ORGAN INVOLVEMENT STATUS: ICD-10-CM

## 2019-12-17 DIAGNOSIS — I27.20 PULMONARY HTN: Primary | ICD-10-CM

## 2019-12-17 DIAGNOSIS — M25.572 CHRONIC PAIN OF BOTH ANKLES: ICD-10-CM

## 2019-12-17 DIAGNOSIS — E06.3 HASHIMOTO'S THYROIDITIS: ICD-10-CM

## 2019-12-17 DIAGNOSIS — G89.29 CHRONIC PAIN OF BOTH ANKLES: ICD-10-CM

## 2019-12-17 DIAGNOSIS — M25.571 CHRONIC PAIN OF BOTH ANKLES: ICD-10-CM

## 2019-12-17 DIAGNOSIS — M54.32 SCIATIC PAIN, LEFT: ICD-10-CM

## 2019-12-17 DIAGNOSIS — R76.8 THYROGLOBULIN ANTIBODY POSITIVE: ICD-10-CM

## 2019-12-17 PROCEDURE — 3008F PR BODY MASS INDEX (BMI) DOCUMENTED: ICD-10-PCS | Mod: CPTII,S$GLB,, | Performed by: INTERNAL MEDICINE

## 2019-12-17 PROCEDURE — 99215 PR OFFICE/OUTPT VISIT, EST, LEVL V, 40-54 MIN: ICD-10-PCS | Mod: S$GLB,,, | Performed by: INTERNAL MEDICINE

## 2019-12-17 PROCEDURE — 3008F BODY MASS INDEX DOCD: CPT | Mod: CPTII,S$GLB,, | Performed by: INTERNAL MEDICINE

## 2019-12-17 PROCEDURE — 99215 OFFICE O/P EST HI 40 MIN: CPT | Mod: S$GLB,,, | Performed by: INTERNAL MEDICINE

## 2019-12-17 PROCEDURE — 99999 PR PBB SHADOW E&M-EST. PATIENT-LVL III: ICD-10-PCS | Mod: PBBFAC,,, | Performed by: INTERNAL MEDICINE

## 2019-12-17 PROCEDURE — 99999 PR PBB SHADOW E&M-EST. PATIENT-LVL III: CPT | Mod: PBBFAC,,, | Performed by: INTERNAL MEDICINE

## 2019-12-17 RX ORDER — SPIRONOLACTONE 25 MG/1
25 TABLET ORAL DAILY
COMMUNITY
End: 2019-12-27

## 2019-12-17 NOTE — PROGRESS NOTES
Subjective:       Patient ID: Jne Lazar is a 29 y.o. female.    Chief Complaint: No chief complaint on file.    Follow  Up: SLE.  plaquenil BID and is taking prednisone 2 times per week on average.her chest wall improved, TITO had PA pressure 31,  Arthralgias include hands, R elbow, toes and L ankle. X-ray ankle recently was negative. NO SOB, No cough, no raynauds no sign crest, chest xray clear.  Current treatment:  1. Plaquenil 200 mg BID  2. Prednisone 5 mg daily PRN      Review of Systems   Constitutional: Positive for activity change, chills and fatigue. Negative for appetite change, diaphoresis, fever and unexpected weight change.   HENT: Negative for congestion, dental problem, ear discharge, ear pain, facial swelling, mouth sores, nosebleeds, postnasal drip, rhinorrhea, sinus pressure, sinus pain, sneezing, sore throat, tinnitus, trouble swallowing and voice change.    Eyes: Negative for photophobia, pain, discharge, redness, itching and visual disturbance.   Respiratory: Negative for apnea, cough, chest tightness, shortness of breath and wheezing.    Cardiovascular: Negative for chest pain, palpitations and leg swelling.   Gastrointestinal: Positive for abdominal pain. Negative for abdominal distention, constipation, diarrhea, nausea and vomiting.   Endocrine: Negative for cold intolerance, heat intolerance, polydipsia and polyuria.   Genitourinary: Negative for decreased urine volume, difficulty urinating, dysuria, flank pain, frequency, hematuria and urgency.   Musculoskeletal: Positive for arthralgias, joint swelling, neck pain and neck stiffness. Negative for back pain and gait problem.   Skin: Negative for pallor, rash and wound.        Sweating   Allergic/Immunologic: Negative for immunocompromised state.   Neurological: Positive for weakness. Negative for dizziness, tremors, light-headedness, numbness and headaches.   Hematological: Positive for adenopathy. Does not bruise/bleed easily.    Psychiatric/Behavioral: Positive for sleep disturbance. Negative for dysphoric mood. The patient is not nervous/anxious.          Objective:     Vitals:    12/17/19 1303   BP: 104/69   Pulse: 76            Physical Exam   Vitals reviewed.  Constitutional: She is oriented to person, place, and time and well-developed, well-nourished, and in no distress.   HENT:   Head: Normocephalic and atraumatic.   Nose: Mucosal edema present. No rhinorrhea. Right sinus exhibits no maxillary sinus tenderness and no frontal sinus tenderness. Left sinus exhibits no maxillary sinus tenderness and no frontal sinus tenderness.   Mouth/Throat: Oropharynx is clear and moist.   Eyes: EOM are normal. Pupils are equal, round, and reactive to light. Right conjunctiva is not injected. Left conjunctiva is not injected. Right eye exhibits normal extraocular motion. Left eye exhibits normal extraocular motion.   Neck: Neck supple. No JVD present. No thyromegaly present.   Cardiovascular: Normal rate, regular rhythm and normal heart sounds.  Exam reveals no gallop, no friction rub and no decreased pulses.    No murmur heard.  Pulmonary/Chest: Breath sounds normal. She has no wheezes. She has no rhonchi. She has no rales. She exhibits no tenderness.   Abdominal: There is no tenderness. There is no rebound and no guarding.       Right Side Rheumatological Exam     Examination finds the shoulder, elbow, knee, 1st MCP, 2nd MCP, 3rd MCP, 4th PIP, 4th MCP and 5th MCP normal.    The patient is tender to palpation of the shoulder, elbow, wrist, knee, 1st PIP, 1st MCP, 2nd PIP, 2nd MCP, 3rd PIP, 3rd MCP, 4th PIP, 4th MCP, 5th PIP and 5th MCP    She has swelling of the elbow and wrist    Shoulder Exam   Tenderness Location: acromion    Range of Motion   Active abduction: abnormal   Adduction: abnormal  Sensation: normal    Knee Exam   Tenderness Location: medial joint line and LCL  Patellofemoral Crepitus: positive  Effusion: positive  Sensation:  normal    Hip Exam   Tenderness Location: posterior and lateral  Sensation: normal    Elbow/Wrist Exam   Tenderness Location: no tenderness  Sensation: normal    Left Side Rheumatological Exam     Examination finds the shoulder, knee, 1st MCP, 2nd MCP, 3rd MCP, 4th PIP, 4th MCP, 5th PIP and 5th MCP normal.    The patient is tender to palpation of the shoulder, elbow, wrist, knee, 1st PIP, 1st MCP, 2nd PIP, 2nd MCP, 3rd PIP, 3rd MCP, 4th PIP, 4th MCP, 5th PIP and 5th MCP.    Shoulder Exam   Tenderness Location: acromion    Range of Motion   Active abduction: abnormal   Sensation: normal    Knee Exam   Tenderness Location: lateral joint line and medial joint line    Patellofemoral Crepitus: positive  Effusion: positive  Sensation: normal    Hip Exam   Tenderness Location: posterior and lateral  Sensation: normal    Elbow/Wrist Exam   Sensation: normal    Foot Exam     Ankle Warmth: negative  Ankle Swelling: positive (lateral)      Back/Neck Exam   Tenderness Right paramedian tenderness of the Upper C-Spine, Upper T-Spine, Upper L-Spine and SI Joint.Left paramedian tenderness of the Upper C-Spine, Upper T-Spine, SI Joint and Upper L-Spine.      Lymphadenopathy:     She has no cervical adenopathy.   Neurological: She is alert and oriented to person, place, and time. Gait normal.   Skin: No rash noted. There is erythema. There is pallor.     Psychiatric: Mood, memory, affect and judgment normal.   Musculoskeletal: She exhibits tenderness and deformity.        Results for orders placed or performed in visit on 12/12/19   CBC auto differential   Result Value Ref Range    WBC 4.79 3.90 - 12.70 K/uL    RBC 4.33 4.00 - 5.40 M/uL    Hemoglobin 12.3 12.0 - 16.0 g/dL    Hematocrit 39.4 37.0 - 48.5 %    Mean Corpuscular Volume 91 82 - 98 fL    Mean Corpuscular Hemoglobin 28.4 27.0 - 31.0 pg    Mean Corpuscular Hemoglobin Conc 31.2 (L) 32.0 - 36.0 g/dL    RDW 12.4 11.5 - 14.5 %    Platelets 210 150 - 350 K/uL    MPV 10.8 9.2 -  12.9 fL    Immature Granulocytes 0.2 0.0 - 0.5 %    Gran # (ANC) 3.1 1.8 - 7.7 K/uL    Immature Grans (Abs) 0.01 0.00 - 0.04 K/uL    Lymph # 1.2 1.0 - 4.8 K/uL    Mono # 0.3 0.3 - 1.0 K/uL    Eos # 0.2 0.0 - 0.5 K/uL    Baso # 0.05 0.00 - 0.20 K/uL    nRBC 0 0 /100 WBC    Gran% 63.7 38.0 - 73.0 %    Lymph% 25.1 18.0 - 48.0 %    Mono% 6.9 4.0 - 15.0 %    Eosinophil% 3.1 0.0 - 8.0 %    Basophil% 1.0 0.0 - 1.9 %    Differential Method Automated    Comprehensive metabolic panel   Result Value Ref Range    Sodium 141 136 - 145 mmol/L    Potassium 3.8 3.5 - 5.1 mmol/L    Chloride 108 95 - 110 mmol/L    CO2 28 23 - 29 mmol/L    Glucose 88 70 - 110 mg/dL    BUN, Bld 13 6 - 20 mg/dL    Creatinine 0.8 0.5 - 1.4 mg/dL    Calcium 9.3 8.7 - 10.5 mg/dL    Total Protein 7.3 6.0 - 8.4 g/dL    Albumin 4.0 3.5 - 5.2 g/dL    Total Bilirubin 0.6 0.1 - 1.0 mg/dL    Alkaline Phosphatase 65 55 - 135 U/L    AST 13 10 - 40 U/L    ALT 8 (L) 10 - 44 U/L    Anion Gap 5 (L) 8 - 16 mmol/L    eGFR if African American >60.0 >60 mL/min/1.73 m^2    eGFR if non African American >60.0 >60 mL/min/1.73 m^2   BISI Screen w/Reflex   Result Value Ref Range    BISI Screen Negative <1:160 Negative <1:160   Sedimentation rate   Result Value Ref Range    Sed Rate 5 0 - 20 mm/Hr   C-reactive protein   Result Value Ref Range    CRP 0.8 0.0 - 8.2 mg/L   Anti-thyroglobulin antibody   Result Value Ref Range    Thyroglobulin Ab Screen 45.7 (H) 0.0 - 3.9 IU/mL   TSH   Result Value Ref Range    TSH 0.611 0.400 - 4.000 uIU/mL   Lipid panel   Result Value Ref Range    Cholesterol 159 120 - 199 mg/dL    Triglycerides 50 30 - 150 mg/dL    HDL 73 40 - 75 mg/dL    LDL Cholesterol 76.0 63.0 - 159.0 mg/dL    Hdl/Cholesterol Ratio 45.9 20.0 - 50.0 %    Total Cholesterol/HDL Ratio 2.2 2.0 - 5.0    Non-HDL Cholesterol 86 mg/dL   echo 12/19  · Normal left ventricular systolic function. The estimated ejection fraction is 60%  · Normal LV diastolic function.  · Normal right  ventricular systolic function.  · Amplatzer closure device used.  · Mild tricuspid regurgitation.  · Normal central venous pressure (3 mm Hg).  · The estimated PA systolic pressure is 31 mm Hg   echo 4/19  · Normal left ventricular systolic function. The estimated ejection fraction is 65%  · Normal LV diastolic function.  · Mild left ventricular enlargement.  · No wall motion abnormalities.  · Normal right ventricular systolic function.  · Mild tricuspid regurgitation.  · The estimated PA systolic pressure is 20 mm Hg  · Normal central venous pressure (3 mm Hg).  · Amplatzer closure device used.     reviewed labs with patient during this visit     Assessment:       1. Pulmonary HTN    2. Systemic lupus erythematosus, unspecified SLE type, unspecified organ involvement status    3. Chronic pain of both ankles    4. Sciatic pain, left    5. Hashimoto's thyroiditis    6. Thyroglobulin antibody positive            Plan:       Diagnoses and all orders for this visit:    Pulmonary HTN  Comments:  PA pressure 31 TITO, ? pulmonary AVN  Orders:  -     CBC auto differential; Future  -     Comprehensive metabolic panel; Future  -     C-reactive protein; Future  -     Sedimentation rate; Future  -     Ambulatory consult to Pulmonology    Systemic lupus erythematosus, unspecified SLE type, unspecified organ involvement status  -     CBC auto differential; Future  -     Comprehensive metabolic panel; Future  -     C-reactive protein; Future  -     Sedimentation rate; Future    Chronic pain of both ankles  -     CBC auto differential; Future  -     Comprehensive metabolic panel; Future  -     C-reactive protein; Future  -     Sedimentation rate; Future    Sciatic pain, left  -     CBC auto differential; Future  -     Comprehensive metabolic panel; Future  -     C-reactive protein; Future  -     Sedimentation rate; Future    Hashimoto's thyroiditis  -     CBC auto differential; Future  -     Comprehensive metabolic panel; Future  -      C-reactive protein; Future  -     Sedimentation rate; Future    Thyroglobulin antibody positive  -     CBC auto differential; Future  -     Comprehensive metabolic panel; Future  -     C-reactive protein; Future  -     Sedimentation rate; Future      Doing fair, needs PFT and lung eval and  Cardiovascular surgery to  Help, 2 trimester miscarriages no DVT or PE

## 2019-12-17 NOTE — TELEPHONE ENCOUNTER
Returned patient call and informed to head this way now and get checked in. Will bring back to exam room once a room is available . Patient stated she did not know she had appointment today when she scheduled her appointment this evening in [place] for 5 pm.

## 2019-12-17 NOTE — TELEPHONE ENCOUNTER
----- Message from Xin Knapp sent at 12/17/2019  8:18 AM CST -----  Contact: Jen pt  Type:  Sooner Apoointment Request    Caller is requesting a sooner appointment.  Caller declined first available appointment listed below.  Caller will not accept being placed on the waitlist and is requesting a message be sent to doctor.    Name of Caller:  Jen  When is the first available appointment?  Today at 330  Symptoms:  n/a  Best Call Back Number:  [phone]  Additional Information:  Pls call pt to see if she can be seen earlier today. She has another appt in [employer] for 5

## 2019-12-18 ENCOUNTER — TELEPHONE (OUTPATIENT)
Dept: RHEUMATOLOGY | Facility: CLINIC | Age: 29
End: 2019-12-18

## 2019-12-19 ENCOUNTER — TELEPHONE (OUTPATIENT)
Dept: TRANSPLANT | Facility: CLINIC | Age: 29
End: 2019-12-19

## 2019-12-19 DIAGNOSIS — I27.9 CHRONIC PULMONARY HEART DISEASE: ICD-10-CM

## 2019-12-19 DIAGNOSIS — R06.82 TACHYPNEA: ICD-10-CM

## 2019-12-19 DIAGNOSIS — Z79.899 POLYPHARMACY: Primary | ICD-10-CM

## 2019-12-19 LAB — ANA SER QL IF: NORMAL

## 2019-12-19 NOTE — TELEPHONE ENCOUNTER
----- Message from Mell  sent at 12/18/2019  9:20 AM CST -----  Contact: pt  Type: Needs Medical Advice    Who Called:      Best Call Back Number:     Additional Information: Requesting a call back from Nurse, regarding pt has questions about which Pulmonary or Pulmonary hypertension Dr or does pt needs to see both, please call with advice

## 2019-12-20 NOTE — TELEPHONE ENCOUNTER
Spoke to pt and she would like to know if she is supposed to see the pulmonologist you referred her to or the pulmonary hypertension specialist that the cardiologist referred her to or both. Please advise. Thanks.

## 2019-12-27 ENCOUNTER — INITIAL CONSULT (OUTPATIENT)
Dept: TRANSPLANT | Facility: CLINIC | Age: 29
End: 2019-12-27
Payer: COMMERCIAL

## 2019-12-27 VITALS
BODY MASS INDEX: 26.05 KG/M2 | HEART RATE: 72 BPM | SYSTOLIC BLOOD PRESSURE: 101 MMHG | HEIGHT: 62 IN | WEIGHT: 141.56 LBS | DIASTOLIC BLOOD PRESSURE: 70 MMHG

## 2019-12-27 DIAGNOSIS — Q27.30 AVM (ARTERIOVENOUS MALFORMATION): ICD-10-CM

## 2019-12-27 DIAGNOSIS — O99.891 SYSTEMIC LUPUS ERYTHEMATOSUS (SLE) AFFECTING PREGNANCY, ANTEPARTUM: Primary | ICD-10-CM

## 2019-12-27 DIAGNOSIS — I27.20 PULMONARY HYPERTENSION: ICD-10-CM

## 2019-12-27 DIAGNOSIS — M32.9 SYSTEMIC LUPUS ERYTHEMATOSUS (SLE) AFFECTING PREGNANCY, ANTEPARTUM: Primary | ICD-10-CM

## 2019-12-27 PROCEDURE — 99999 PR PBB SHADOW E&M-EST. PATIENT-LVL III: ICD-10-PCS | Mod: PBBFAC,,, | Performed by: INTERNAL MEDICINE

## 2019-12-27 PROCEDURE — 3008F PR BODY MASS INDEX (BMI) DOCUMENTED: ICD-10-PCS | Mod: CPTII,S$GLB,, | Performed by: INTERNAL MEDICINE

## 2019-12-27 PROCEDURE — 99205 OFFICE O/P NEW HI 60 MIN: CPT | Mod: S$GLB,,, | Performed by: INTERNAL MEDICINE

## 2019-12-27 PROCEDURE — 99999 PR PBB SHADOW E&M-EST. PATIENT-LVL III: CPT | Mod: PBBFAC,,, | Performed by: INTERNAL MEDICINE

## 2019-12-27 PROCEDURE — 3008F BODY MASS INDEX DOCD: CPT | Mod: CPTII,S$GLB,, | Performed by: INTERNAL MEDICINE

## 2019-12-27 PROCEDURE — 99205 PR OFFICE/OUTPT VISIT, NEW, LEVL V, 60-74 MIN: ICD-10-PCS | Mod: S$GLB,,, | Performed by: INTERNAL MEDICINE

## 2019-12-27 RX ORDER — SPIRONOLACTONE 50 MG/1
TABLET, FILM COATED ORAL
COMMUNITY
Start: 2019-12-17 | End: 2020-08-13

## 2019-12-27 NOTE — PROGRESS NOTES
Subjective:    Patient ID:  Jen Lazar is a 29 y.o. female who presents for evaluation of Pulmonary Hypertension.    HPI  30 yo F w/ PFO s/p closure  and SLE 2017 who presents for  evaluation of PH (referred by Dr Juarez.)      Pt followed with Dr Crisostomo for years- would get CP, SOB, though she was fairly active, things got harder for her to do and she would get OOB easily. He mentioned the possibility of a pulm AVM and a coil however this was never explored    reports her lupus affects mostly her joints and skin. Occasionally feels like someone is sitting on her chest and she cant get enough air in- a couple of times in the last couple months has felt like she was going to pass out. Occurs when she is sitting, not exertional- does not exercise regularly as she has 4 kids (3 are little) and she does run around and play with them- occasionally has to stop and catch and her breath but not often. Thinks she has some fluid retention in her legs. Still gets occasional CP too but no where near as often as before, says it's rare. This occurs at random, also not exertional and lasts a minute and passes.     When pregnant with her daughter had an echo and her PAP was a little high ()      SH nonsmoker, occasional etoh, no drugs, no anorexogens    FH: adopted, but MGM has pulm fibrosis and her great aunt  of pulm edema and lupus    TTE   · Normal left ventricular systolic function. The estimated ejection fraction is 60%  · Normal LV diastolic function.  · Normal right ventricular systolic function.  · Amplatzer closure device used.  · Mild tricuspid regurgitation.  · Normal central venous pressure (3 mm Hg).  · The estimated PA systolic pressure is 31 mm Hg     TTE   · Normal left ventricular systolic function. The estimated ejection fraction is 65%  · Normal LV diastolic function.  · Mild left ventricular enlargement.  · No wall motion abnormalities.  · Normal right ventricular systolic  "function.  · Mild tricuspid regurgitation.  · The estimated PA systolic pressure is 20 mm Hg  · Normal central venous pressure (3 mm Hg).  · Amplatzer closure device used.    Six Minute Walk Test:   n/a    Echo        Results for orders placed or performed during the hospital encounter of 01/31/18   2D Echo w/ Color Flow Doppler   Result Value Ref Range    QEF 55 55 - 65    Diastolic Dysfunction No     Mitral Valve Mobility NORMAL        EKG   /    /      RHC  2016  BP: 103/59  SPO2: 100  PA: 19/6 (12)  PW: 8/12 (5)  RPA: 17/5 (10)  RV: 20  RVEDP: 4     RA: 3  RA: /12 (6)  LA: 14/12 (6)  RV_SAT: 85  PA_SAT: 87  RPA_SAT: 87    E. Angiographic Results     Diagnostic:        - Left Pulmonary Artery:             The Left Pulmonary Artery is normal. There is DANIELE 3 flow.     - Right Pulmonary Artery:             The Right Pulmonary Artery is normal. There is DANIELE 3 flow.        Review of Systems   Constitution: Negative for chills, fever, malaise/fatigue and weight gain.   HENT: Negative.    Eyes: Negative.    Cardiovascular: Positive for chest pain and dyspnea on exertion. Negative for leg swelling, near-syncope, orthopnea, palpitations, paroxysmal nocturnal dyspnea and syncope.   Respiratory: Negative for cough and shortness of breath.    Endocrine: Negative.    Skin: Negative.    Musculoskeletal: Negative.    Gastrointestinal: Negative for bloating, abdominal pain and change in bowel habit.   Neurological: Negative for dizziness and light-headedness.   Psychiatric/Behavioral: Negative for depression.        Objective:  /70 (BP Location: Right arm, Patient Position: Sitting, BP Method: Medium (Automatic))   Pulse 72   Ht 5' 2" (1.575 m)   Wt 64.2 kg (141 lb 8.6 oz)   BMI 25.89 kg/m²       Physical Exam   Constitutional: She is oriented to person, place, and time. She appears well-developed and well-nourished.   HENT:   Head: Normocephalic and atraumatic.   Eyes: Right eye exhibits no discharge. Left eye " exhibits no discharge.   Neck: Neck supple. No JVD present. No thyromegaly present.   Cardiovascular: Normal rate and regular rhythm. Exam reveals no gallop and no friction rub.   No murmur heard.       Pulmonary/Chest: Effort normal and breath sounds normal. No respiratory distress. She has no wheezes. She has no rales.   Abdominal: Soft. Bowel sounds are normal. She exhibits no distension. There is no tenderness.   Musculoskeletal: Normal range of motion. She exhibits no edema or tenderness.   Neurological: She is alert and oriented to person, place, and time. No cranial nerve deficit. Coordination normal.   Skin: Skin is warm and dry. No rash noted.   Psychiatric: She has a normal mood and affect. Judgment and thought content normal.           Chemistry        Component Value Date/Time     12/13/2019 0830    K 3.8 12/13/2019 0830     12/13/2019 0830    CO2 28 12/13/2019 0830    BUN 13 12/13/2019 0830    CREATININE 0.8 12/13/2019 0830    CREATININE 0.7 05/18/2013 0010    GLU 88 12/13/2019 0830        Component Value Date/Time    CALCIUM 9.3 12/13/2019 0830    CALCIUM 9.5 05/18/2013 0010    ALKPHOS 65 12/13/2019 0830    ALKPHOS 60 05/18/2013 0010    AST 13 12/13/2019 0830    AST 18 05/11/2016 1117    ALT 8 (L) 12/13/2019 0830    BILITOT 0.6 12/13/2019 0830    ESTGFRAFRICA >60.0 12/13/2019 0830    EGFRNONAA >60.0 12/13/2019 0830            No results found for: MG    Lab Results   Component Value Date    WBC 4.79 12/13/2019    HGB 12.3 12/13/2019    HCT 39.4 12/13/2019    MCV 91 12/13/2019     12/13/2019       Lab Results   Component Value Date    INR 0.9 12/22/2016    INR 1.1 02/28/2012       No results found for: BNP    No results found for: LDH          Assessment:       1. Systemic lupus erythematosus (SLE) affecting pregnancy, antepartum    2. Pulmonary hypertension    3. AVM (arteriovenous malformation)      WHO Group:1   Functional Class2     Plan:       Nuclear quant perf scan to assess  for presence and size of shunting as pt's last RHC/PA gram mentions the presence of a pulmonary AVM- if significant will refer back to interventional card for coil    PAP by echo alone not severe enough to repeat RHC at this time- would cont annual echos given presence of SLE and plan for RHC when PASP>40 or if RV starts to dilate or become depressed (can be ordered by her rheumatologist Dr Mims). If she does in fact has a pulm AVM I anticipate that she will undergo repeat RHC at the time that interventional cardiology places a coil.

## 2020-01-10 ENCOUNTER — HOSPITAL ENCOUNTER (OUTPATIENT)
Dept: RADIOLOGY | Facility: HOSPITAL | Age: 30
Discharge: HOME | End: 2020-01-10
Attending: INTERNAL MEDICINE
Payer: COMMERCIAL

## 2020-01-10 DIAGNOSIS — Q27.30 AVM (ARTERIOVENOUS MALFORMATION): ICD-10-CM

## 2020-01-10 PROCEDURE — 78597 LUNG PERFUSION DIFFERENTIAL: CPT | Mod: TC

## 2020-01-10 PROCEDURE — 78597 NM LUNG QUANT DIFFERENTIAL PERFUSION W IMAGING: ICD-10-PCS | Mod: 26,,, | Performed by: RADIOLOGY

## 2020-01-10 PROCEDURE — A9540 TC99M MAA: HCPCS

## 2020-01-10 PROCEDURE — 78597 LUNG PERFUSION DIFFERENTIAL: CPT | Mod: 26,,, | Performed by: RADIOLOGY

## 2020-01-13 ENCOUNTER — PATIENT MESSAGE (OUTPATIENT)
Dept: TRANSPLANT | Facility: CLINIC | Age: 30
End: 2020-01-13

## 2020-01-13 DIAGNOSIS — Q25.72 PULMONARY ARTERIOVENOUS MALFORMATION: ICD-10-CM

## 2020-01-17 ENCOUNTER — HOSPITAL ENCOUNTER (OUTPATIENT)
Dept: RADIOLOGY | Facility: HOSPITAL | Age: 30
Discharge: HOME | End: 2020-01-17
Attending: INTERNAL MEDICINE
Payer: COMMERCIAL

## 2020-01-17 DIAGNOSIS — Q25.72 PULMONARY ARTERIOVENOUS MALFORMATION: ICD-10-CM

## 2020-01-17 PROCEDURE — 25500020 PHARM REV CODE 255: Performed by: INTERNAL MEDICINE

## 2020-01-17 PROCEDURE — 71275 CT ANGIOGRAPHY CHEST: CPT | Mod: TC

## 2020-01-17 PROCEDURE — 71275 CTA CHEST NON CORONARY: ICD-10-PCS | Mod: 26,,, | Performed by: RADIOLOGY

## 2020-01-17 PROCEDURE — 71275 CT ANGIOGRAPHY CHEST: CPT | Mod: 26,,, | Performed by: RADIOLOGY

## 2020-01-17 RX ADMIN — IOHEXOL 100 ML: 350 INJECTION, SOLUTION INTRAVENOUS at 08:01

## 2020-01-20 ENCOUNTER — PATIENT MESSAGE (OUTPATIENT)
Dept: RHEUMATOLOGY | Facility: CLINIC | Age: 30
End: 2020-01-20

## 2020-01-20 ENCOUNTER — PATIENT MESSAGE (OUTPATIENT)
Dept: TRANSPLANT | Facility: CLINIC | Age: 30
End: 2020-01-20

## 2020-01-20 DIAGNOSIS — M25.519 ACROMIOCLAVICULAR JOINT PAIN, UNSPECIFIED LATERALITY: ICD-10-CM

## 2020-01-20 DIAGNOSIS — M32.9 SYSTEMIC LUPUS ERYTHEMATOSUS, UNSPECIFIED SLE TYPE, UNSPECIFIED ORGAN INVOLVEMENT STATUS: ICD-10-CM

## 2020-01-20 DIAGNOSIS — S16.9XXA: ICD-10-CM

## 2020-01-20 DIAGNOSIS — R07.89 STERNAL PAIN: Primary | ICD-10-CM

## 2020-01-20 DIAGNOSIS — M25.611: ICD-10-CM

## 2020-01-29 RX ORDER — DEXTROAMPHETAMINE SACCHARATE, AMPHETAMINE ASPARTATE, DEXTROAMPHETAMINE SULFATE AND AMPHETAMINE SULFATE 2.5; 2.5; 2.5; 2.5 MG/1; MG/1; MG/1; MG/1
1 TABLET ORAL DAILY PRN
Qty: 30 TABLET | Refills: 0 | Status: SHIPPED | OUTPATIENT
Start: 2020-01-29 | End: 2020-03-12 | Stop reason: SDUPTHER

## 2020-02-04 ENCOUNTER — PATIENT MESSAGE (OUTPATIENT)
Dept: RHEUMATOLOGY | Facility: CLINIC | Age: 30
End: 2020-02-04

## 2020-02-10 ENCOUNTER — HOSPITAL ENCOUNTER (OUTPATIENT)
Dept: RADIOLOGY | Facility: HOSPITAL | Age: 30
Discharge: HOME | End: 2020-02-10
Attending: INTERNAL MEDICINE
Payer: COMMERCIAL

## 2020-02-10 ENCOUNTER — TELEPHONE (OUTPATIENT)
Dept: TRANSPLANT | Facility: CLINIC | Age: 30
End: 2020-02-10

## 2020-02-10 DIAGNOSIS — S16.9XXA: ICD-10-CM

## 2020-02-10 DIAGNOSIS — R07.89 STERNAL PAIN: ICD-10-CM

## 2020-02-10 DIAGNOSIS — M25.611: ICD-10-CM

## 2020-02-10 DIAGNOSIS — M25.519 ACROMIOCLAVICULAR JOINT PAIN, UNSPECIFIED LATERALITY: ICD-10-CM

## 2020-02-10 DIAGNOSIS — M32.9 SYSTEMIC LUPUS ERYTHEMATOSUS, UNSPECIFIED SLE TYPE, UNSPECIFIED ORGAN INVOLVEMENT STATUS: ICD-10-CM

## 2020-02-10 PROCEDURE — 70490 CT SOFT TISSUE NECK W/O DYE: CPT | Mod: 26,,, | Performed by: RADIOLOGY

## 2020-02-10 PROCEDURE — 70490 CT SOFT TISSUE NECK WITHOUT CONTRAST: ICD-10-PCS | Mod: 26,,, | Performed by: RADIOLOGY

## 2020-02-10 PROCEDURE — 70490 CT SOFT TISSUE NECK W/O DYE: CPT | Mod: TC,PO

## 2020-02-10 NOTE — TELEPHONE ENCOUNTER
Returned call to pt. Labs not needed. Pt reports she can't make 6 min walk tomorrow, but suspect this is also not needed. Pt having CT scan to r/o AVM. F/U only as needed.

## 2020-02-10 NOTE — TELEPHONE ENCOUNTER
----- Message from Fiona Livingston sent at 2/10/2020 10:09 AM CST -----  Contact: Pt  Pt requesting a call back in regards to Labs, would like to know if Labs is needed, states that she already did labs    Please call and advise    Phone [phone]

## 2020-03-12 ENCOUNTER — OFFICE VISIT (OUTPATIENT)
Dept: FAMILY MEDICINE | Facility: CLINIC | Age: 30
End: 2020-03-12
Payer: COMMERCIAL

## 2020-03-12 VITALS
SYSTOLIC BLOOD PRESSURE: 100 MMHG | TEMPERATURE: 98 F | DIASTOLIC BLOOD PRESSURE: 70 MMHG | HEIGHT: 62 IN | BODY MASS INDEX: 26.86 KG/M2 | OXYGEN SATURATION: 97 % | HEART RATE: 91 BPM | WEIGHT: 145.94 LBS

## 2020-03-12 DIAGNOSIS — L70.9 ACNE, UNSPECIFIED ACNE TYPE: ICD-10-CM

## 2020-03-12 DIAGNOSIS — F90.2 ADHD (ATTENTION DEFICIT HYPERACTIVITY DISORDER), COMBINED TYPE: Primary | ICD-10-CM

## 2020-03-12 PROCEDURE — 99999 PR PBB SHADOW E&M-EST. PATIENT-LVL III: CPT | Mod: PBBFAC,,, | Performed by: FAMILY MEDICINE

## 2020-03-12 PROCEDURE — 99213 PR OFFICE/OUTPT VISIT, EST, LEVL III, 20-29 MIN: ICD-10-PCS | Mod: S$GLB,,, | Performed by: FAMILY MEDICINE

## 2020-03-12 PROCEDURE — 99213 OFFICE O/P EST LOW 20 MIN: CPT | Mod: S$GLB,,, | Performed by: FAMILY MEDICINE

## 2020-03-12 PROCEDURE — 3008F PR BODY MASS INDEX (BMI) DOCUMENTED: ICD-10-PCS | Mod: CPTII,S$GLB,, | Performed by: FAMILY MEDICINE

## 2020-03-12 PROCEDURE — 3008F BODY MASS INDEX DOCD: CPT | Mod: CPTII,S$GLB,, | Performed by: FAMILY MEDICINE

## 2020-03-12 PROCEDURE — 99999 PR PBB SHADOW E&M-EST. PATIENT-LVL III: ICD-10-PCS | Mod: PBBFAC,,, | Performed by: FAMILY MEDICINE

## 2020-03-12 RX ORDER — DEXTROAMPHETAMINE SACCHARATE, AMPHETAMINE ASPARTATE, DEXTROAMPHETAMINE SULFATE AND AMPHETAMINE SULFATE 2.5; 2.5; 2.5; 2.5 MG/1; MG/1; MG/1; MG/1
1 TABLET ORAL DAILY PRN
Qty: 30 TABLET | Refills: 0 | Status: SHIPPED | OUTPATIENT
Start: 2020-03-12 | End: 2020-05-05 | Stop reason: SDUPTHER

## 2020-03-12 NOTE — PROGRESS NOTES
Subjective:       Patient ID: Jen Lazar is a 30 y.o. female.    Chief Complaint: Follow-up (6 mo follow up, medication refills )    Here for f/u ADD.  Doing well on current medications. No complaints today.    Review of Systems   Constitutional: Negative for chills and fever.   Respiratory: Negative for cough, chest tightness and shortness of breath.    Cardiovascular: Negative for chest pain, palpitations and leg swelling.   Endocrine: Negative for cold intolerance and heat intolerance.   Psychiatric/Behavioral: Negative for decreased concentration. The patient is not nervous/anxious.        Objective:      Physical Exam   Constitutional: She appears well-developed and well-nourished.   HENT:   Head: Normocephalic and atraumatic.   Cardiovascular: Normal rate, regular rhythm and normal heart sounds.   Pulmonary/Chest: Effort normal and breath sounds normal.   Psychiatric: She has a normal mood and affect.   Nursing note and vitals reviewed.      Assessment:       1. ADHD (attention deficit hyperactivity disorder), combined type    2. Acne, unspecified acne type        Plan:       ADHD (attention deficit hyperactivity disorder), combined type    Acne, unspecified acne type  -     Ambulatory referral/consult to Dermatology; Future; Expected date: 03/19/2020    Other orders  -     dextroamphetamine-amphetamine (ADDERALL) 10 mg Tab; Take 1 tablet (10 mg total) by mouth daily as needed.  Dispense: 30 tablet; Refill: 0        Doing well overall so continue medication.  Ok in  database.  Will monitor chronic medical issues and continue current plan of care.  She has discussed current meds with cardiology and will continue and monitor closely but may need to change.  Follow up in about 6 months (around 9/12/2020), or if symptoms worsen or fail to improve.

## 2020-03-17 ENCOUNTER — PATIENT MESSAGE (OUTPATIENT)
Dept: RHEUMATOLOGY | Facility: CLINIC | Age: 30
End: 2020-03-17

## 2020-04-30 RX ORDER — DEXTROAMPHETAMINE SACCHARATE, AMPHETAMINE ASPARTATE, DEXTROAMPHETAMINE SULFATE AND AMPHETAMINE SULFATE 2.5; 2.5; 2.5; 2.5 MG/1; MG/1; MG/1; MG/1
1 TABLET ORAL DAILY PRN
Qty: 30 TABLET | Refills: 0 | Status: CANCELLED | OUTPATIENT
Start: 2020-04-30

## 2020-05-05 ENCOUNTER — PATIENT MESSAGE (OUTPATIENT)
Dept: FAMILY MEDICINE | Facility: CLINIC | Age: 30
End: 2020-05-05

## 2020-05-05 ENCOUNTER — TELEPHONE (OUTPATIENT)
Dept: FAMILY MEDICINE | Facility: CLINIC | Age: 30
End: 2020-05-05

## 2020-05-05 ENCOUNTER — DOCUMENTATION ONLY (OUTPATIENT)
Dept: FAMILY MEDICINE | Facility: CLINIC | Age: 30
End: 2020-05-05

## 2020-05-05 RX ORDER — DEXTROAMPHETAMINE SACCHARATE, AMPHETAMINE ASPARTATE, DEXTROAMPHETAMINE SULFATE AND AMPHETAMINE SULFATE 2.5; 2.5; 2.5; 2.5 MG/1; MG/1; MG/1; MG/1
1 TABLET ORAL DAILY PRN
Qty: 30 TABLET | Refills: 0 | Status: SHIPPED | OUTPATIENT
Start: 2020-05-05 | End: 2020-07-03 | Stop reason: SDUPTHER

## 2020-05-05 NOTE — PROGRESS NOTES
PA:  Jessenia-Dextroamphetamine 10 mg tablets    [facility] Key: [id number]  Case ID:   [id number]  [employer]  --------------------------------------  APPROVED  Valid 5/5/2020 - 5/5/2023

## 2020-05-05 NOTE — TELEPHONE ENCOUNTER
I called and spoke with the Pt and she stated that her pharmacy will not fill her medication because they need a PA.

## 2020-05-05 NOTE — TELEPHONE ENCOUNTER
----- Message from Leander Pinon sent at 5/5/2020  1:34 PM CDT -----  Contact: pt  Type:  Pharmacy Calling to Clarify an RX    Name of Caller:  pt  Pharmacy Name:    [facility] - [place] - [address]  [address]  [place] [address]  Phone: [phone] Fax: [phone]    Prescription Name:  dextroamphetamine-amphetamine (ADDERALL) 10 mg Tab  What do they need to clarify?:  New ins needs PA  Best Call Back Number:  [phone]  Additional Information:  Please call to advise

## 2020-07-03 ENCOUNTER — PATIENT MESSAGE (OUTPATIENT)
Dept: FAMILY MEDICINE | Facility: CLINIC | Age: 30
End: 2020-07-03

## 2020-07-03 RX ORDER — DEXTROAMPHETAMINE SACCHARATE, AMPHETAMINE ASPARTATE, DEXTROAMPHETAMINE SULFATE AND AMPHETAMINE SULFATE 2.5; 2.5; 2.5; 2.5 MG/1; MG/1; MG/1; MG/1
1 TABLET ORAL DAILY PRN
Qty: 30 TABLET | Refills: 0 | Status: SHIPPED | OUTPATIENT
Start: 2020-07-03 | End: 2020-08-20 | Stop reason: SDUPTHER

## 2020-07-10 ENCOUNTER — PATIENT MESSAGE (OUTPATIENT)
Dept: FAMILY MEDICINE | Facility: CLINIC | Age: 30
End: 2020-07-10

## 2020-07-21 ENCOUNTER — PATIENT MESSAGE (OUTPATIENT)
Dept: RHEUMATOLOGY | Facility: CLINIC | Age: 30
End: 2020-07-21

## 2020-08-13 ENCOUNTER — OFFICE VISIT (OUTPATIENT)
Dept: NEUROLOGY | Facility: CLINIC | Age: 30
End: 2020-08-13
Payer: COMMERCIAL

## 2020-08-13 VITALS
RESPIRATION RATE: 17 BRPM | DIASTOLIC BLOOD PRESSURE: 72 MMHG | SYSTOLIC BLOOD PRESSURE: 109 MMHG | HEIGHT: 62 IN | BODY MASS INDEX: 26.86 KG/M2 | HEART RATE: 87 BPM | WEIGHT: 145.94 LBS

## 2020-08-13 DIAGNOSIS — R29.810 FACIAL WEAKNESS: Primary | ICD-10-CM

## 2020-08-13 PROCEDURE — 99999 PR PBB SHADOW E&M-EST. PATIENT-LVL III: CPT | Mod: PBBFAC,,, | Performed by: PSYCHIATRY & NEUROLOGY

## 2020-08-13 PROCEDURE — 99214 PR OFFICE/OUTPT VISIT, EST, LEVL IV, 30-39 MIN: ICD-10-PCS | Mod: S$GLB,,, | Performed by: PSYCHIATRY & NEUROLOGY

## 2020-08-13 PROCEDURE — 99214 OFFICE O/P EST MOD 30 MIN: CPT | Mod: S$GLB,,, | Performed by: PSYCHIATRY & NEUROLOGY

## 2020-08-13 PROCEDURE — 99999 PR PBB SHADOW E&M-EST. PATIENT-LVL III: ICD-10-PCS | Mod: PBBFAC,,, | Performed by: PSYCHIATRY & NEUROLOGY

## 2020-08-13 RX ORDER — PREDNISONE 10 MG/1
TABLET ORAL
Qty: 45 TABLET | Refills: 0 | Status: SHIPPED | OUTPATIENT
Start: 2020-08-13 | End: 2020-08-23

## 2020-08-13 NOTE — PROGRESS NOTES
Date of service: 8/13/2020  Referring provider: No ref. provider found    Subjective:      Chief complaint: Headache and Facial Droop       Patient ID: Jen Lazar is a 30 y.o. lady with atrial septic defect, right bundle branch block, SLE on Plaquenil (feb 2017), history of syncope, Ibs, fibromyalgia, pineal cyst, and migraine with and without aura who is presenting for follow up     History of Present Illness    INTERVAL HISTORY - 8/13/2020    Last seen in 2017 for the follow up of severe visual auras during pregnancy that resolved with IV magnesium bridge.     Last night she was seen in the emergency department for right-sided facial droop that was noted upon waking up.  Her vital signs were within normal limits.  The emergency department physical exam noted right facial droop otherwise normal exam.  Brain MRI was performed which showed only mild bilateral maxillary sinus inflammation.  The case was discussed with  who agreed she could be discharged home with follow-up for presumed Bell's palsy.  She was discharged on a Medrol Dosepak.    Today she reports - the night before the facial droop, she had a head and some palpitations. She woke up at 620am and the right corner of her mouth felt swollen and heavy. When she smiled, there was a droop on the right side. Later on the rest of the right face (in the cheek) felt like it was numb and pulling. Today it feels like it is slightly better.     No double vision, no blurred vision, no slurring,no dysphagia, no aphasia, no issues with extremities. Cold a few weeks ago no recent sick symptoms.    + chest pain / palpations - under left breast, ache, not pleuritic, no shortness of breath, his has resolved  + right thigh pain - knee to groin      INTERVAL HISTORY - 12/21/17    Last seen as a new consult for constant visual auras in the setting of pregnancy, 2 months ago. I referred for magnesium infusions (Magnesium 2g x 1 hour infusion at [facility] x 3 days in a row, then once per week x 2 weeks, then once every 2 weeks x 2). She could not tolerate the wean to once per week (auras returned right away) so on 11/8/17 I increased the infusions to:     Banana bag over 2 hours + magnesium 2g over 1 hour  3 times per week x 1 week  2 times per week x 2 weeks  1 time per week standing     This month she had infusion on 12/6; 12/12; 12/15. Her next one 12/28.      Today she reports she is much better. She has not had any further auras since I increased her magnesium infusions. She is not really having headaches other than yesterday when she was sick from stomach virus. Her current head pain is 0 with a range of 1 to 5. She is taking magnesium citrate orally as well.     ORIGINAL HEADACHE HISTORY - 10/16/17   Age at onset and course over time: migraines began 20 years ago but have become more frequent in the last 2 months (she is 19 weeks pregnant). Also getting many more acephalgic visual auras - happened fairly continuously now and multiple times per day; classic central, scintillating scotoma but also sometimes just tightness in right upper arm. Prior to that happened only 2-3 days per month. This did not occur with her previous pregnancies and in fact migraines improved. Auras are more than migraines.   Location: frontotemporal   Quality: pressure, throbbing  Severity: 3-6, current 0  Duration: hours to days  Frequency: every 2-3 days (1-2 days per month prior)   Alleviated by: sleep,darkness   Exacerbated by: light, noise, cough, sneezing, bending over  Associated with: Photophobia, phonophobia, blurred vision, nausea, dizziness, problems with concentration + visual aura (spots) and tightness in right arm (had it a while ago)   Sleep habits:  Caffeine intake: 1    Current acute treatment:  -- tylenol - not used   (ASA 81mg)    Current prevention:  -- magnesium citrate daily     Previously tried/failed acute treatment:  -- ibuprofen 800mg  -- treximet  --  maxalt   -- sumatriptan   -- tylenol  -- naproxen  -- excedrin     Previously tried/failed preventative treatment:  -- PFO closed in Jan 2017   -- lamotrigine  -- topiramate  -- metoprolol    Review of patient's allergies indicates:   Allergen Reactions    Codeine Nausea And Vomiting    Vicodin [hydrocodone-acetaminophen] Nausea And Vomiting     Current Outpatient Medications   Medication Sig Dispense Refill    dextroamphetamine-amphetamine (ADDERALL) 10 mg Tab Take 1 tablet (10 mg total) by mouth daily as needed. 30 tablet 0    hydroxychloroquine (PLAQUENIL) 200 mg tablet Take 1 tablet (200 mg total) by mouth 2 (two) times daily. 60 tablet 7    ibuprofen-famotidine (DUEXIS) 800-26.6 mg Tab Take 1 tablet by mouth after meals as needed.      levonorgestrel (MIRENA) 20 mcg/24 hr (5 years) IUD Mirena 20 mcg/24 hr (5 years) intrauterine device   Take by intrauterine route.      predniSONE (DELTASONE) 10 MG tablet 60mg PO daily x 5 days then reduce by 10mg daily until off (10 days total) 45 tablet 0     No current facility-administered medications for this visit.        Past Medical History  Past Medical History:   Diagnosis Date    Anemia     ASD (atrial septal defect)     s/p device occlusion 12/2016    Bundle branch block, right     Fibromyalgia     pt denies    Ganglion cyst     Headache(784.0)     IBS (irritable bowel syndrome)     Kidney stone     PONV (postoperative nausea and vomiting)     Sinus tachycardia     SLE (systemic lupus erythematosus related syndrome)     Syncope and collapse        Past Surgical History  Past Surgical History:   Procedure Laterality Date    ASD REPAIR  12/2016    BREAST SURGERY      breast augmentation    GANGLION CYST EXCISION Left     KNEE ARTHROSCOPY Right        Family History  Family History   Adopted: Yes   Problem Relation Age of Onset    Anemia Mother     Fainting Mother     Hypertension Father     Arrhythmia Father     Congenital heart disease  Daughter         pda    No Known Problems Maternal Grandmother     No Known Problems Maternal Grandfather     No Known Problems Paternal Grandmother     No Known Problems Paternal Grandfather     No Known Problems Maternal Aunt     No Known Problems Maternal Uncle     No Known Problems Paternal Aunt     No Known Problems Paternal Uncle     Asthma Neg Hx     Clotting disorder Neg Hx     Heart attack Neg Hx     Heart disease Neg Hx     Heart failure Neg Hx     Hyperlipidemia Neg Hx     Stroke Neg Hx     Atrial Septal Defect Neg Hx     Pacemaker/defibrilator Neg Hx     Early death Neg Hx        Social History  Social History     Socioeconomic History    Marital status:      Spouse name: Not on file    Number of children: 1    Years of education: Not on file    Highest education level: Not on file   Occupational History    Occupation: Housewife   Social Needs    Financial resource strain: Not on file    Food insecurity     Worry: Not on file     Inability: Not on file    Transportation needs     Medical: Not on file     Non-medical: Not on file   Tobacco Use    Smoking status: Never Smoker    Smokeless tobacco: Never Used   Substance and Sexual Activity    Alcohol use: No    Drug use: No    Sexual activity: Yes     Partners: Male   Lifestyle    Physical activity     Days per week: Not on file     Minutes per session: Not on file    Stress: Not on file   Relationships    Social connections     Talks on phone: Not on file     Gets together: Not on file     Attends [employer]ical service: Not on file     Active member of club or organization: Not on file     Attends meetings of clubs or organizations: Not on file     Relationship status: Not on file   Other Topics Concern    Not on file   Social History Narrative    Not on file        Review of Systems    14-point review of systems as follows:   No check vira indicates NEGATIVE response   Constitutional: [] weight loss, [] change to  appetite   Eyes: [] change in vision, [] double vision   Ears, nose, mouth, throat: [] frequent nose bleeds, [] ringing in the ears   Respiratory: [] cough, [] wheezing   Cardiovascular: [] chest pain, [x] palpitations   Gastrointestinal: [] jaundice, [] nausea/vomiting   Genitourinary: [] incontinence, [] burning with urination   Hematologic/lymphatic: [] easy bruising/bleeding, [] night sweats   Neurological: [x] numbness, [x] weakness   Endocrine: [] fatigue, [] heat/cold intolerance   Allergy/Immunologic: [] fevers, [] chills   Musculoskeletal: [x] muscle pain, [x] joint pain   Psychiatric: [] thoughts of harming self/others, [] depression   Integumentary: [] rashes, [x] sores that do not heal     Objective:        Vitals:    08/13/20 1307   BP: 109/72   Pulse: 87   Resp: 17     Body mass index is 26.69 kg/m².     08/13/2020  General:  Appears comfortable and in no distress    CV: RRR, no murmur, rub or gallop    Chest:  Unlabored respirations, breath sounds clear    Neurologic:   Cortical functions: recent and remote memory intact, normal attention span and concentration, speech fluent, adequate fund of knowledge   Cranial nerves: visual fields full, PERRLA, EOMI, hearing intact, face - forehead raises normally, eye closure is full, there is no right facial (lip) droop at rest or with involuntary activation, there is a mild weakness of the right lips when asked to smile,  shoulder shrug 5/5, tongue protrudes midline   Reflexes: 2+ in the upper and lower extremities, no Stratton  Sensation: intact to temperature throughout  Coordination: normal finger to nose, heel to shin, tandem gait     No rashes or lesions around the ears of face  The TMJ and parotid are non tender   The mastoid is not tender        Data Review:     08/12/2020 MRI brain with and without contrast - I personally reviewed this study which is normal    11/2/17 MRI brain:  I personally reviewed this study which I interpreted to show stable pineal  cyst     11/2/17 MRA brain:   I personally reviewed this study which I interpreted to be normal    11/2/17 MRV head  I personally reviewed this study which I interpreted to be normal     Results for orders placed or performed during the hospital encounter of 04/19/13   MRI Brain W WO Contrast    Narrative    Sagittal and axial images are obtained with T1 weighting.  Additional axial images are obtained with T2, flair, diffusion weighting and ADC map.  Following the administration of IV Multihance gadolinium contrast 10mls, axial and coronal images are   obtained.    The general brain anatomy appears within normal limits.  There is normal medulla, cynthia, brainstem, basal ganglia, corpus callosum, cerebral and cerebellar lobar structure.  The pituitary is not enlarged.  The internal auditory canals are sharp.  The   basal cisterns are clear and symmetric.    There is an 8mm a pineal cyst unchanged from the prior MR of 01/28/10.    There is no mass-effect or midline shift.  The ventricles are of normal size and symmetric.  The gray-white matter differentiation is normal.  Within the brain parenchyma no hyper or hypo-intensity lesions consistent with tumor, edema, CVA or hemorrhage   is seen..    Following administration of gadolinium contrast, no abnormal areas of contrast enhancement are seen.    Impression     Stable 8mm pineal cyst otherwise negative MRI of the brain with and without gadolinium      Electronically signed by: Marko Hernandez MD  Date:     04/19/13  Time:    15:50        Lab Results   Component Value Date     08/12/2020    K 4.8 08/12/2020     08/12/2020    CO2 26 08/12/2020    BUN 13 08/12/2020    CREATININE 0.62 08/12/2020    CREATININE 0.7 05/18/2013    GLU 86 08/12/2020    AST 36 08/12/2020    AST 18 05/11/2016    ALT 19 08/12/2020    ALBUMIN 5.1 08/12/2020    PROT 8.8 (H) 08/12/2020    BILITOT 1.0 08/12/2020    CHOL 159 12/13/2019    HDL 73 12/13/2019    LDLCALC 76.0 12/13/2019    TRIG  50 12/13/2019       Lab Results   Component Value Date    WBC 7.06 08/12/2020    HGB 13.8 08/12/2020    HCT 41.7 08/12/2020    MCV 91 08/12/2020     08/12/2020       Lab Results   Component Value Date    TSH 0.611 12/13/2019       Assessment & Plan:       Problem List Items Addressed This Visit     None      Visit Diagnoses     Facial weakness    -  Primary    Relevant Medications    predniSONE (DELTASONE) 10 MG tablet              Patient woke up on the morning of 08/12/2020 with subtle right facial droop, affecting only the lips.  The lower face overall has a heavy sensation.  This progressed as the day went on.  Brain imaging performed in the emergency room was negative for acute stroke, there is also no enhancement of the facial nerve in the auditory canal however this may not be seen without an IAC protocol.  The physical exam is atypical for Bell's palsy of the weakness is very mild and does not involve the upper face.  We may be in a very early Bell's palsy thus I do recommend treating with steroids, however I will maximize the dosage as what she was prescribed in the ED was a bit too low.  Alternatively, it may be just a terminal branch of the facial nerve that is inflamed or infected relating to her known lupus or immunosuppression.  Treatment at this stage largely the same.  As patient to message me in approximately 2 weeks and update me on her progress.  If she is not making progress, or if she is getting worse, then we will reimage within IAC protocol.    TESTING:  -- repeat brain MRI if not making progress in a couple of weeks     TREATMENT FOR BELL'S PALSY    10 day steroid course, to be used in the morning, with food, and antacid medication (over the counter)  DAY 1: 60mg  DAY 2: 60mg  DAY 3: 60mg  DAY 4: 60mg  DAY 5: 60mg  DAY 6: 50mg  DAY 7: 40mg  DAY 8: 30mg  DAY 9: 20mg  DAY 10: 10mg, then stop     EYE CHAMBER:  Your eye muscles are weak, which means that at night, your eye cannot close  "completely to protect your eyeball from dust, debris, and bedding. This puts you at risk for your eye drying out and developing PAINFUL scratches on the cornea. You will need to protect your eye every night using an eye chamber and lubricant, until your eye muscles return to normal strength. Normal strength means that no one is able to force your eyelids open while you are trying to close them with maximal force.     Cover your eye-ball with eye lubrictant called Lacri-lube available in any pharmacy, such as the one below:      Next, cover your eye ball with an "eye chamber."  Do NOT put gauze on your eye!     An "eye chamber" for Bell's palsy can be purchased online or certain pharmacies and looks like this, or you can create one at home using some routine supplies:              To make an eye chamber at home, you will need to cut heavy- duty plastic wrap (Saran wrap) to fit over your eye socket, and tape it to the bony parts around your eye (forehead, bridge of nose, temple, and cheek). Using alcohol to dry the skin can make the tape stick better if it comes loose. Be careful for to drip alcohol into the eye.   ;    No follow-ups on file.     Isabel Wilder MD            "

## 2020-08-13 NOTE — PATIENT INSTRUCTIONS
"  TESTING:  -- repeat brain MRI if not making progress in a couple of weeks     TREATMENT FOR BELL'S PALSY    10 day steroid course, to be used in the morning, with food, and antacid medication (over the counter)  DAY 1: 60mg  DAY 2: 60mg  DAY 3: 60mg  DAY 4: 60mg  DAY 5: 60mg  DAY 6: 50mg  DAY 7: 40mg  DAY 8: 30mg  DAY 9: 20mg  DAY 10: 10mg, then stop     EYE CHAMBER:  Your eye muscles are weak, which means that at night, your eye cannot close completely to protect your eyeball from dust, debris, and bedding. This puts you at risk for your eye drying out and developing PAINFUL scratches on the cornea. You will need to protect your eye every night using an eye chamber and lubricant, until your eye muscles return to normal strength. Normal strength means that no one is able to force your eyelids open while you are trying to close them with maximal force.     Cover your eye-ball with eye lubrictant called Lacri-lube available in any pharmacy, such as the one below:      Next, cover your eye ball with an "eye chamber."  Do NOT put gauze on your eye!     An "eye chamber" for Bell's palsy can be purchased online or certain pharmacies and looks like this, or you can create one at home using some routine supplies:              To make an eye chamber at home, you will need to cut heavy- duty plastic wrap (Saran wrap) to fit over your eye socket, and tape it to the bony parts around your eye (forehead, bridge of nose, temple, and cheek). Using alcohol to dry the skin can make the tape stick better if it comes loose. Be careful for to drip alcohol into the eye.   ;      "

## 2020-09-14 ENCOUNTER — OFFICE VISIT (OUTPATIENT)
Dept: FAMILY MEDICINE | Facility: CLINIC | Age: 30
End: 2020-09-14
Payer: COMMERCIAL

## 2020-09-14 DIAGNOSIS — F90.2 ADHD (ATTENTION DEFICIT HYPERACTIVITY DISORDER), COMBINED TYPE: Primary | ICD-10-CM

## 2020-09-14 PROCEDURE — 99213 OFFICE O/P EST LOW 20 MIN: CPT | Mod: 95,,, | Performed by: FAMILY MEDICINE

## 2020-09-14 PROCEDURE — 99213 PR OFFICE/OUTPT VISIT, EST, LEVL III, 20-29 MIN: ICD-10-PCS | Mod: 95,,, | Performed by: FAMILY MEDICINE

## 2020-09-14 RX ORDER — DEXTROAMPHETAMINE SACCHARATE, AMPHETAMINE ASPARTATE, DEXTROAMPHETAMINE SULFATE AND AMPHETAMINE SULFATE 2.5; 2.5; 2.5; 2.5 MG/1; MG/1; MG/1; MG/1
1 TABLET ORAL DAILY PRN
Qty: 30 TABLET | Refills: 0 | Status: SHIPPED | OUTPATIENT
Start: 2020-09-18 | End: 2020-10-26 | Stop reason: SDUPTHER

## 2020-09-14 NOTE — PROGRESS NOTES
Subjective:       Patient ID: Jen Lazar is a 30 y.o. female.    Chief Complaint: No chief complaint on file.    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      The patient location is: home in [place]  The chief complaint leading to consultation is: ADD    Visit type: audiovisual    Face to Face time with patient: 5 minutes  10 minutes of total time spent on the encounter, which includes face to face time and non-face to face time preparing to see the patient (eg, review of tests), Obtaining and/or reviewing separately obtained history, Documenting clinical information in the electronic or other health record, Independently interpreting results (not separately reported) and communicating results to the patient/family/caregiver, or Care coordination (not separately reported).         Each patient to whom he or she provides medical services by telemedicine is:  (1) informed of the relationship between the physician and patient and the respective role of any other health care provider with respect to management of the patient; and (2) notified that he or she may decline to receive medical services by telemedicine and may withdraw from such care at any time.    Notes:   Review of Systems   Constitutional: Negative for activity change, chills, fever and unexpected weight change.   HENT: Negative for hearing loss, rhinorrhea and trouble swallowing.    Eyes: Negative for discharge and visual disturbance.   Respiratory: Negative for cough, chest tightness, shortness of breath and wheezing.    Cardiovascular: Negative for chest pain, palpitations and leg swelling.   Gastrointestinal: Negative for blood in stool, constipation, diarrhea and vomiting.   Endocrine: Negative for cold intolerance, heat intolerance, polydipsia and polyuria.   Genitourinary: Negative for difficulty urinating, dysuria and hematuria.   Musculoskeletal: Negative for arthralgias, joint swelling and neck pain.   Neurological:  Negative for weakness and headaches.   Psychiatric/Behavioral: Negative for confusion, decreased concentration and dysphoric mood. The patient is not nervous/anxious.        Objective:      Physical Exam  Vitals signs and nursing note reviewed.   Constitutional:       Appearance: She is well-developed.   Psychiatric:         Mood and Affect: Mood normal.         Behavior: Behavior normal.         Assessment:       1. ADHD (attention deficit hyperactivity disorder), combined type        Plan:       ADHD (attention deficit hyperactivity disorder), combined type    Other orders  -     dextroamphetamine-amphetamine (ADDERALL) 10 mg Tab; Take 1 tablet (10 mg total) by mouth daily as needed.  Dispense: 30 tablet; Refill: 0      Ok in  database  Refill med and monitor  Will monitor chronic medical issues and continue current plan of care.      Follow up in about 6 months (around 3/14/2021), or if symptoms worsen or fail to improve.

## 2020-10-26 ENCOUNTER — PATIENT MESSAGE (OUTPATIENT)
Dept: FAMILY MEDICINE | Facility: CLINIC | Age: 30
End: 2020-10-26

## 2020-10-26 RX ORDER — DEXTROAMPHETAMINE SACCHARATE, AMPHETAMINE ASPARTATE, DEXTROAMPHETAMINE SULFATE AND AMPHETAMINE SULFATE 2.5; 2.5; 2.5; 2.5 MG/1; MG/1; MG/1; MG/1
1 TABLET ORAL DAILY PRN
Qty: 30 TABLET | Refills: 0 | Status: SHIPPED | OUTPATIENT
Start: 2020-10-26 | End: 2020-11-30 | Stop reason: SDUPTHER

## 2020-11-30 ENCOUNTER — PATIENT MESSAGE (OUTPATIENT)
Dept: FAMILY MEDICINE | Facility: CLINIC | Age: 30
End: 2020-11-30

## 2020-12-01 RX ORDER — DEXTROAMPHETAMINE SACCHARATE, AMPHETAMINE ASPARTATE, DEXTROAMPHETAMINE SULFATE AND AMPHETAMINE SULFATE 2.5; 2.5; 2.5; 2.5 MG/1; MG/1; MG/1; MG/1
1 TABLET ORAL DAILY PRN
Qty: 30 TABLET | Refills: 0 | Status: SHIPPED | OUTPATIENT
Start: 2020-12-01 | End: 2021-01-20 | Stop reason: SDUPTHER

## 2021-01-20 ENCOUNTER — PATIENT MESSAGE (OUTPATIENT)
Dept: RHEUMATOLOGY | Facility: CLINIC | Age: 31
End: 2021-01-20

## 2021-01-20 ENCOUNTER — PATIENT MESSAGE (OUTPATIENT)
Dept: FAMILY MEDICINE | Facility: CLINIC | Age: 31
End: 2021-01-20

## 2021-01-20 RX ORDER — DEXTROAMPHETAMINE SACCHARATE, AMPHETAMINE ASPARTATE, DEXTROAMPHETAMINE SULFATE AND AMPHETAMINE SULFATE 2.5; 2.5; 2.5; 2.5 MG/1; MG/1; MG/1; MG/1
1 TABLET ORAL DAILY PRN
Qty: 30 TABLET | Refills: 0 | Status: SHIPPED | OUTPATIENT
Start: 2021-01-20 | End: 2021-02-24 | Stop reason: SDUPTHER

## 2021-01-25 ENCOUNTER — PATIENT OUTREACH (OUTPATIENT)
Dept: ADMINISTRATIVE | Facility: OTHER | Age: 31
End: 2021-01-25

## 2021-01-27 ENCOUNTER — OFFICE VISIT (OUTPATIENT)
Dept: CARDIOLOGY | Facility: CLINIC | Age: 31
End: 2021-01-27
Payer: COMMERCIAL

## 2021-01-27 ENCOUNTER — HOSPITAL ENCOUNTER (OUTPATIENT)
Dept: CARDIOLOGY | Facility: HOSPITAL | Age: 31
Discharge: HOME | End: 2021-01-27
Attending: INTERNAL MEDICINE
Payer: COMMERCIAL

## 2021-01-27 VITALS
SYSTOLIC BLOOD PRESSURE: 120 MMHG | HEART RATE: 91 BPM | SYSTOLIC BLOOD PRESSURE: 106 MMHG | WEIGHT: 140.88 LBS | HEIGHT: 62 IN | DIASTOLIC BLOOD PRESSURE: 78 MMHG | WEIGHT: 150 LBS | BODY MASS INDEX: 25.92 KG/M2 | HEART RATE: 96 BPM | BODY MASS INDEX: 27.6 KG/M2 | OXYGEN SATURATION: 98 % | DIASTOLIC BLOOD PRESSURE: 77 MMHG | HEIGHT: 62 IN

## 2021-01-27 DIAGNOSIS — Q21.12 PFO (PATENT FORAMEN OVALE): Primary | ICD-10-CM

## 2021-01-27 DIAGNOSIS — Q21.12 PFO (PATENT FORAMEN OVALE): ICD-10-CM

## 2021-01-27 LAB
ASCENDING AORTA: 2.67 CM
AV INDEX (PROSTH): 0.84
AV MEAN GRADIENT: 4 MMHG
AV PEAK GRADIENT: 7 MMHG
AV VALVE AREA: 3.29 CM2
AV VELOCITY RATIO: 0.91
BSA FOR ECHO PROCEDURE: 1.73 M2
CV ECHO LV RWT: 0.4 CM
DOP CALC AO PEAK VEL: 1.28 M/S
DOP CALC AO VTI: 23.51 CM
DOP CALC LVOT AREA: 3.9 CM2
DOP CALC LVOT DIAMETER: 2.23 CM
DOP CALC LVOT PEAK VEL: 1.17 M/S
DOP CALC LVOT STROKE VOLUME: 77.41 CM3
DOP CALCLVOT PEAK VEL VTI: 19.83 CM
E WAVE DECELERATION TIME: 213.62 MSEC
E/A RATIO: 1.2
E/E' RATIO: 6.72 M/S
ECHO LV POSTERIOR WALL: 0.76 CM (ref 0.6–1.1)
FRACTIONAL SHORTENING: 31 % (ref 28–44)
INTERVENTRICULAR SEPTUM: 0.66 CM (ref 0.6–1.1)
LA MAJOR: 4.37 CM
LA MINOR: 4.24 CM
LA WIDTH: 3.3 CM
LEFT ATRIUM SIZE: 3.05 CM
LEFT ATRIUM VOLUME INDEX MOD: 17.2 ML/M2
LEFT ATRIUM VOLUME INDEX: 21.8 ML/M2
LEFT ATRIUM VOLUME MOD: 29.01 CM3
LEFT ATRIUM VOLUME: 36.82 CM3
LEFT INTERNAL DIMENSION IN SYSTOLE: 2.66 CM (ref 2.1–4)
LEFT VENTRICLE DIASTOLIC VOLUME INDEX: 37.41 ML/M2
LEFT VENTRICLE DIASTOLIC VOLUME: 63.23 ML
LEFT VENTRICLE MASS INDEX: 44 G/M2
LEFT VENTRICLE SYSTOLIC VOLUME INDEX: 15.4 ML/M2
LEFT VENTRICLE SYSTOLIC VOLUME: 26.11 ML
LEFT VENTRICULAR INTERNAL DIMENSION IN DIASTOLE: 3.83 CM (ref 3.5–6)
LEFT VENTRICULAR MASS: 74.25 G
LV LATERAL E/E' RATIO: 5.6 M/S
LV SEPTAL E/E' RATIO: 8.4 M/S
MV A" WAVE DURATION": 9.9 MSEC
MV PEAK A VEL: 0.7 M/S
MV PEAK E VEL: 0.84 M/S
PISA TR MAX VEL: 2.01 M/S
PULM VEIN S/D RATIO: 1.16
PV PEAK D VEL: 0.51 M/S
PV PEAK S VEL: 0.59 M/S
RA MAJOR: 3.32 CM
RA PRESSURE: 3 MMHG
RA WIDTH: 3.14 CM
RIGHT VENTRICULAR END-DIASTOLIC DIMENSION: 2.82 CM
SINUS: 3.28 CM
STJ: 2.36 CM
TDI LATERAL: 0.15 M/S
TDI SEPTAL: 0.1 M/S
TDI: 0.13 M/S
TR MAX PG: 16 MMHG
TRICUSPID ANNULAR PLANE SYSTOLIC EXCURSION: 2.18 CM
TV REST PULMONARY ARTERY PRESSURE: 19 MMHG

## 2021-01-27 PROCEDURE — 99999 PR PBB SHADOW E&M-EST. PATIENT-LVL III: CPT | Mod: PBBFAC,,, | Performed by: INTERNAL MEDICINE

## 2021-01-27 PROCEDURE — 93306 TTE W/DOPPLER COMPLETE: CPT | Mod: 26,,, | Performed by: INTERNAL MEDICINE

## 2021-01-27 PROCEDURE — 99212 OFFICE O/P EST SF 10 MIN: CPT | Mod: S$GLB,,, | Performed by: INTERNAL MEDICINE

## 2021-01-27 PROCEDURE — 93306 TTE W/DOPPLER COMPLETE: CPT

## 2021-01-27 PROCEDURE — 93306 ECHO (CUPID ONLY): ICD-10-PCS | Mod: 26,,, | Performed by: INTERNAL MEDICINE

## 2021-01-27 PROCEDURE — 99999 PR PBB SHADOW E&M-EST. PATIENT-LVL III: ICD-10-PCS | Mod: PBBFAC,,, | Performed by: INTERNAL MEDICINE

## 2021-01-27 PROCEDURE — 99212 PR OFFICE/OUTPT VISIT, EST, LEVL II, 10-19 MIN: ICD-10-PCS | Mod: S$GLB,,, | Performed by: INTERNAL MEDICINE

## 2021-02-10 ENCOUNTER — OFFICE VISIT (OUTPATIENT)
Dept: ORTHOPEDICS | Facility: CLINIC | Age: 31
End: 2021-02-10
Payer: COMMERCIAL

## 2021-02-10 VITALS
DIASTOLIC BLOOD PRESSURE: 70 MMHG | HEART RATE: 82 BPM | HEIGHT: 62 IN | BODY MASS INDEX: 25.4 KG/M2 | SYSTOLIC BLOOD PRESSURE: 111 MMHG | WEIGHT: 138 LBS

## 2021-02-10 DIAGNOSIS — G56.21 ULNAR NEUROPATHY AT ELBOW, RIGHT: ICD-10-CM

## 2021-02-10 DIAGNOSIS — M54.12 CERVICAL RADICULITIS: Primary | ICD-10-CM

## 2021-02-10 PROCEDURE — 99203 OFFICE O/P NEW LOW 30 MIN: CPT | Mod: S$GLB,,, | Performed by: ORTHOPAEDIC SURGERY

## 2021-02-10 PROCEDURE — 99203 PR OFFICE/OUTPT VISIT, NEW, LEVL III, 30-44 MIN: ICD-10-PCS | Mod: S$GLB,,, | Performed by: ORTHOPAEDIC SURGERY

## 2021-02-10 RX ORDER — MELOXICAM 15 MG/1
15 TABLET ORAL DAILY
Qty: 30 TABLET | Refills: 2 | Status: SHIPPED | OUTPATIENT
Start: 2021-02-10 | End: 2021-03-12

## 2021-02-10 RX ORDER — LEVONORGESTREL 52 MG/1
INTRAUTERINE DEVICE INTRAUTERINE
COMMUNITY

## 2021-02-24 ENCOUNTER — PATIENT MESSAGE (OUTPATIENT)
Dept: FAMILY MEDICINE | Facility: CLINIC | Age: 31
End: 2021-02-24

## 2021-02-24 RX ORDER — DEXTROAMPHETAMINE SACCHARATE, AMPHETAMINE ASPARTATE, DEXTROAMPHETAMINE SULFATE AND AMPHETAMINE SULFATE 2.5; 2.5; 2.5; 2.5 MG/1; MG/1; MG/1; MG/1
1 TABLET ORAL DAILY PRN
Qty: 30 TABLET | Refills: 0 | Status: SHIPPED | OUTPATIENT
Start: 2021-02-24 | End: 2021-04-17 | Stop reason: SDUPTHER

## 2021-03-29 ENCOUNTER — PATIENT MESSAGE (OUTPATIENT)
Dept: FAMILY MEDICINE | Facility: CLINIC | Age: 31
End: 2021-03-29

## 2021-03-29 ENCOUNTER — OFFICE VISIT (OUTPATIENT)
Dept: FAMILY MEDICINE | Facility: CLINIC | Age: 31
End: 2021-03-29
Payer: COMMERCIAL

## 2021-03-29 VITALS
WEIGHT: 134.5 LBS | DIASTOLIC BLOOD PRESSURE: 74 MMHG | SYSTOLIC BLOOD PRESSURE: 114 MMHG | BODY MASS INDEX: 24.6 KG/M2 | HEART RATE: 99 BPM | OXYGEN SATURATION: 98 %

## 2021-03-29 DIAGNOSIS — F90.2 ADHD (ATTENTION DEFICIT HYPERACTIVITY DISORDER), COMBINED TYPE: ICD-10-CM

## 2021-03-29 DIAGNOSIS — Z00.00 WELLNESS EXAMINATION: Primary | ICD-10-CM

## 2021-03-29 PROCEDURE — 99395 PR PREVENTIVE VISIT,EST,18-39: ICD-10-PCS | Mod: S$GLB,,, | Performed by: FAMILY MEDICINE

## 2021-03-29 PROCEDURE — 99999 PR PBB SHADOW E&M-EST. PATIENT-LVL III: ICD-10-PCS | Mod: PBBFAC,,, | Performed by: FAMILY MEDICINE

## 2021-03-29 PROCEDURE — 99999 PR PBB SHADOW E&M-EST. PATIENT-LVL III: CPT | Mod: PBBFAC,,, | Performed by: FAMILY MEDICINE

## 2021-03-29 PROCEDURE — 99395 PREV VISIT EST AGE 18-39: CPT | Mod: S$GLB,,, | Performed by: FAMILY MEDICINE

## 2021-04-16 ENCOUNTER — PATIENT MESSAGE (OUTPATIENT)
Dept: FAMILY MEDICINE | Facility: CLINIC | Age: 31
End: 2021-04-16

## 2021-04-16 DIAGNOSIS — Z02.0 SCHOOL PHYSICAL EXAM: Primary | ICD-10-CM

## 2021-04-20 ENCOUNTER — PATIENT MESSAGE (OUTPATIENT)
Dept: FAMILY MEDICINE | Facility: CLINIC | Age: 31
End: 2021-04-20

## 2021-04-21 RX ORDER — DEXTROAMPHETAMINE SACCHARATE, AMPHETAMINE ASPARTATE, DEXTROAMPHETAMINE SULFATE AND AMPHETAMINE SULFATE 2.5; 2.5; 2.5; 2.5 MG/1; MG/1; MG/1; MG/1
1 TABLET ORAL DAILY PRN
Qty: 30 TABLET | Refills: 0 | Status: SHIPPED | OUTPATIENT
Start: 2021-04-21 | End: 2021-07-20 | Stop reason: SDUPTHER

## 2021-04-22 LAB
GAMMA INTERFERON BACKGROUND BLD IA-ACNC: 0 IU/ML
HBV SURFACE AB SER-ACNC: <3.1 MIU/ML
M TB IFN-G BLD-IMP: NEGATIVE
M TB IFN-G CD4+ BCKGRND COR BLD-ACNC: 0 IU/ML
MEV IGG SER IA-ACNC: 36.2 AU/ML
MITOGEN IGNF BLD-ACNC: >10 IU/ML
MUV IGG SER IA-ACNC: 70.3 AU/ML
QUANTIFERON TB GOLD (INCUBATED): NORMAL
QUANTIFERON TB2 AG VALUE: 0 IU/ML
RUBV IGG SERPL IA-ACNC: 2.19 INDEX
SERVICE CMNT-IMP: NORMAL
VZV IGG SER IA-ACNC: 305 INDEX

## 2021-04-23 ENCOUNTER — PATIENT OUTREACH (OUTPATIENT)
Dept: ADMINISTRATIVE | Facility: OTHER | Age: 31
End: 2021-04-23

## 2021-04-26 ENCOUNTER — OFFICE VISIT (OUTPATIENT)
Dept: RHEUMATOLOGY | Facility: CLINIC | Age: 31
End: 2021-04-26
Payer: COMMERCIAL

## 2021-04-26 VITALS
HEIGHT: 62 IN | DIASTOLIC BLOOD PRESSURE: 71 MMHG | WEIGHT: 134.5 LBS | HEART RATE: 76 BPM | SYSTOLIC BLOOD PRESSURE: 101 MMHG | BODY MASS INDEX: 24.75 KG/M2

## 2021-04-26 DIAGNOSIS — M25.561 CHRONIC PAIN OF BOTH KNEES: ICD-10-CM

## 2021-04-26 DIAGNOSIS — G89.29 CHRONIC PAIN OF BOTH KNEES: ICD-10-CM

## 2021-04-26 DIAGNOSIS — M32.9 SYSTEMIC LUPUS ERYTHEMATOSUS, UNSPECIFIED SLE TYPE, UNSPECIFIED ORGAN INVOLVEMENT STATUS: ICD-10-CM

## 2021-04-26 DIAGNOSIS — E06.3 HASHIMOTO'S THYROIDITIS: Primary | ICD-10-CM

## 2021-04-26 DIAGNOSIS — R76.8 THYROGLOBULIN ANTIBODY POSITIVE: ICD-10-CM

## 2021-04-26 DIAGNOSIS — M25.562 CHRONIC PAIN OF BOTH KNEES: ICD-10-CM

## 2021-04-26 PROCEDURE — 99999 PR PBB SHADOW E&M-EST. PATIENT-LVL III: CPT | Mod: PBBFAC,,, | Performed by: INTERNAL MEDICINE

## 2021-04-26 PROCEDURE — 99999 PR PBB SHADOW E&M-EST. PATIENT-LVL III: ICD-10-PCS | Mod: PBBFAC,,, | Performed by: INTERNAL MEDICINE

## 2021-04-26 PROCEDURE — 99214 PR OFFICE/OUTPT VISIT, EST, LEVL IV, 30-39 MIN: ICD-10-PCS | Mod: S$GLB,,, | Performed by: INTERNAL MEDICINE

## 2021-04-26 PROCEDURE — 99214 OFFICE O/P EST MOD 30 MIN: CPT | Mod: S$GLB,,, | Performed by: INTERNAL MEDICINE

## 2021-04-26 RX ORDER — MELOXICAM 7.5 MG/1
7.5 TABLET ORAL DAILY
COMMUNITY
End: 2021-11-04

## 2021-04-26 RX ORDER — METHOCARBAMOL 750 MG/1
750 TABLET, FILM COATED ORAL 3 TIMES DAILY
Qty: 90 TABLET | Refills: 3 | Status: SHIPPED | OUTPATIENT
Start: 2021-04-26 | End: 2021-05-26

## 2021-04-26 RX ORDER — HYDROXYCHLOROQUINE SULFATE 200 MG/1
200 TABLET, FILM COATED ORAL 2 TIMES DAILY
Qty: 60 TABLET | Refills: 7 | Status: SHIPPED | OUTPATIENT
Start: 2021-04-26

## 2021-04-26 ASSESSMENT — ROUTINE ASSESSMENT OF PATIENT INDEX DATA (RAPID3)
TOTAL RAPID3 SCORE: 3.11
PAIN SCORE: 5
PSYCHOLOGICAL DISTRESS SCORE: 2.2
PATIENT GLOBAL ASSESSMENT SCORE: 4
MDHAQ FUNCTION SCORE: 0.1

## 2021-05-12 ENCOUNTER — PATIENT MESSAGE (OUTPATIENT)
Dept: FAMILY MEDICINE | Facility: CLINIC | Age: 31
End: 2021-05-12

## 2021-05-17 ENCOUNTER — CLINICAL SUPPORT (OUTPATIENT)
Dept: FAMILY MEDICINE | Facility: CLINIC | Age: 31
End: 2021-05-17
Payer: COMMERCIAL

## 2021-05-17 ENCOUNTER — LAB VISIT (OUTPATIENT)
Dept: LAB | Facility: HOSPITAL | Age: 31
End: 2021-05-17
Attending: INTERNAL MEDICINE
Payer: COMMERCIAL

## 2021-05-17 VITALS — HEART RATE: 70 BPM | OXYGEN SATURATION: 98 %

## 2021-05-17 DIAGNOSIS — M32.9 SYSTEMIC LUPUS ERYTHEMATOSUS, UNSPECIFIED SLE TYPE, UNSPECIFIED ORGAN INVOLVEMENT STATUS: ICD-10-CM

## 2021-05-17 DIAGNOSIS — R76.8 THYROGLOBULIN ANTIBODY POSITIVE: ICD-10-CM

## 2021-05-17 DIAGNOSIS — Z23 NEED FOR HEPATITIS B VACCINATION: Primary | ICD-10-CM

## 2021-05-17 DIAGNOSIS — E06.3 HASHIMOTO'S THYROIDITIS: ICD-10-CM

## 2021-05-17 LAB
25(OH)D3+25(OH)D2 SERPL-MCNC: 15 NG/ML (ref 30–96)
ALBUMIN SERPL BCP-MCNC: 4.1 G/DL (ref 3.5–5.2)
ALP SERPL-CCNC: 58 U/L (ref 55–135)
ALT SERPL W/O P-5'-P-CCNC: 9 U/L (ref 10–44)
ANION GAP SERPL CALC-SCNC: 10 MMOL/L (ref 8–16)
AST SERPL-CCNC: 17 U/L (ref 10–40)
BASOPHILS # BLD AUTO: 0.07 K/UL (ref 0–0.2)
BASOPHILS NFR BLD: 1.4 % (ref 0–1.9)
BILIRUB SERPL-MCNC: 0.5 MG/DL (ref 0.1–1)
BUN SERPL-MCNC: 13 MG/DL (ref 6–20)
CALCIUM SERPL-MCNC: 9.3 MG/DL (ref 8.7–10.5)
CHLORIDE SERPL-SCNC: 109 MMOL/L (ref 95–110)
CO2 SERPL-SCNC: 23 MMOL/L (ref 23–29)
CREAT SERPL-MCNC: 0.8 MG/DL (ref 0.5–1.4)
CRP SERPL-MCNC: 0.3 MG/L (ref 0–8.2)
DIFFERENTIAL METHOD: NORMAL
EOSINOPHIL # BLD AUTO: 0.1 K/UL (ref 0–0.5)
EOSINOPHIL NFR BLD: 2.2 % (ref 0–8)
ERYTHROCYTE [DISTWIDTH] IN BLOOD BY AUTOMATED COUNT: 11.9 % (ref 11.5–14.5)
ERYTHROCYTE [SEDIMENTATION RATE] IN BLOOD BY WESTERGREN METHOD: 5 MM/HR (ref 0–20)
EST. GFR  (AFRICAN AMERICAN): >60 ML/MIN/1.73 M^2
EST. GFR  (NON AFRICAN AMERICAN): >60 ML/MIN/1.73 M^2
GLUCOSE SERPL-MCNC: 68 MG/DL (ref 70–110)
HCT VFR BLD AUTO: 39.1 % (ref 37–48.5)
HGB BLD-MCNC: 12.5 G/DL (ref 12–16)
IMM GRANULOCYTES # BLD AUTO: 0.01 K/UL (ref 0–0.04)
IMM GRANULOCYTES NFR BLD AUTO: 0.2 % (ref 0–0.5)
LYMPHOCYTES # BLD AUTO: 1.2 K/UL (ref 1–4.8)
LYMPHOCYTES NFR BLD: 23.9 % (ref 18–48)
MCH RBC QN AUTO: 29.6 PG (ref 27–31)
MCHC RBC AUTO-ENTMCNC: 32 G/DL (ref 32–36)
MCV RBC AUTO: 93 FL (ref 82–98)
MONOCYTES # BLD AUTO: 0.3 K/UL (ref 0.3–1)
MONOCYTES NFR BLD: 5.7 % (ref 4–15)
NEUTROPHILS # BLD AUTO: 3.3 K/UL (ref 1.8–7.7)
NEUTROPHILS NFR BLD: 66.6 % (ref 38–73)
NRBC BLD-RTO: 0 /100 WBC
PLATELET # BLD AUTO: 245 K/UL (ref 150–450)
PMV BLD AUTO: 11.7 FL (ref 9.2–12.9)
POTASSIUM SERPL-SCNC: 3.9 MMOL/L (ref 3.5–5.1)
PROT SERPL-MCNC: 7.4 G/DL (ref 6–8.4)
RBC # BLD AUTO: 4.22 M/UL (ref 4–5.4)
SODIUM SERPL-SCNC: 142 MMOL/L (ref 136–145)
WBC # BLD AUTO: 4.89 K/UL (ref 3.9–12.7)

## 2021-05-17 PROCEDURE — 36415 COLL VENOUS BLD VENIPUNCTURE: CPT | Mod: PO | Performed by: INTERNAL MEDICINE

## 2021-05-17 PROCEDURE — 86140 C-REACTIVE PROTEIN: CPT | Performed by: INTERNAL MEDICINE

## 2021-05-17 PROCEDURE — 83516 IMMUNOASSAY NONANTIBODY: CPT | Performed by: INTERNAL MEDICINE

## 2021-05-17 PROCEDURE — 86225 DNA ANTIBODY NATIVE: CPT | Performed by: INTERNAL MEDICINE

## 2021-05-17 PROCEDURE — 86235 NUCLEAR ANTIGEN ANTIBODY: CPT | Mod: 59 | Performed by: INTERNAL MEDICINE

## 2021-05-17 PROCEDURE — 85025 COMPLETE CBC W/AUTO DIFF WBC: CPT | Performed by: INTERNAL MEDICINE

## 2021-05-17 PROCEDURE — 80053 COMPREHEN METABOLIC PANEL: CPT | Performed by: INTERNAL MEDICINE

## 2021-05-17 PROCEDURE — 85651 RBC SED RATE NONAUTOMATED: CPT | Mod: PO | Performed by: INTERNAL MEDICINE

## 2021-05-17 PROCEDURE — 90746 HEPATITIS B VACCINE ADULT IM: ICD-10-PCS | Mod: S$GLB,,, | Performed by: FAMILY MEDICINE

## 2021-05-17 PROCEDURE — 86038 ANTINUCLEAR ANTIBODIES: CPT | Performed by: INTERNAL MEDICINE

## 2021-05-17 PROCEDURE — 82306 VITAMIN D 25 HYDROXY: CPT | Performed by: INTERNAL MEDICINE

## 2021-05-17 PROCEDURE — 90471 HEPATITIS B VACCINE ADULT IM: ICD-10-PCS | Mod: S$GLB,,, | Performed by: FAMILY MEDICINE

## 2021-05-17 PROCEDURE — 99999 PR PBB SHADOW E&M-EST. PATIENT-LVL II: ICD-10-PCS | Mod: PBBFAC,,,

## 2021-05-17 PROCEDURE — 86235 NUCLEAR ANTIGEN ANTIBODY: CPT | Performed by: INTERNAL MEDICINE

## 2021-05-17 PROCEDURE — 90746 HEPB VACCINE 3 DOSE ADULT IM: CPT | Mod: S$GLB,,, | Performed by: FAMILY MEDICINE

## 2021-05-17 PROCEDURE — 90471 IMMUNIZATION ADMIN: CPT | Mod: S$GLB,,, | Performed by: FAMILY MEDICINE

## 2021-05-17 PROCEDURE — 99999 PR PBB SHADOW E&M-EST. PATIENT-LVL II: CPT | Mod: PBBFAC,,,

## 2021-05-18 LAB
ANA SER QL IF: NORMAL
DSDNA AB SER-ACNC: NORMAL [IU]/ML

## 2021-05-19 LAB
ANTI SM ANTIBODY: 0.07 RATIO (ref 0–0.99)
ANTI SM/RNP ANTIBODY: 0.06 RATIO (ref 0–0.99)
ANTI-SM INTERPRETATION: NEGATIVE
ANTI-SM/RNP INTERPRETATION: NEGATIVE
ANTI-SSA ANTIBODY: 0.06 RATIO (ref 0–0.99)
ANTI-SSA INTERPRETATION: NEGATIVE
ANTI-SSB ANTIBODY: 0.05 RATIO (ref 0–0.99)
ANTI-SSB INTERPRETATION: NEGATIVE

## 2021-05-20 LAB — ENA SCL70 AB SER-ACNC: 3 UNITS

## 2021-05-21 LAB — HISTONE IGG SER IA-ACNC: 0.5 UNITS (ref 0–0.9)

## 2021-05-22 ENCOUNTER — TELEPHONE (OUTPATIENT)
Dept: FAMILY MEDICINE | Facility: CLINIC | Age: 31
End: 2021-05-22

## 2021-05-24 ENCOUNTER — PATIENT MESSAGE (OUTPATIENT)
Dept: FAMILY MEDICINE | Facility: CLINIC | Age: 31
End: 2021-05-24

## 2021-05-26 ENCOUNTER — LAB VISIT (OUTPATIENT)
Dept: LAB | Facility: HOSPITAL | Age: 31
End: 2021-05-26
Attending: FAMILY MEDICINE
Payer: COMMERCIAL

## 2021-05-26 DIAGNOSIS — Z00.00 WELLNESS EXAMINATION: ICD-10-CM

## 2021-05-26 LAB
HUMAN PAPILLOMAVIRUS (HPV): NORMAL
PAP SMEAR: NORMAL

## 2021-05-26 PROCEDURE — 80061 LIPID PANEL: CPT | Performed by: FAMILY MEDICINE

## 2021-05-26 PROCEDURE — 36415 COLL VENOUS BLD VENIPUNCTURE: CPT | Mod: PO | Performed by: FAMILY MEDICINE

## 2021-05-26 PROCEDURE — 84443 ASSAY THYROID STIM HORMONE: CPT | Performed by: FAMILY MEDICINE

## 2021-05-27 LAB
CHOLEST SERPL-MCNC: 141 MG/DL (ref 120–199)
CHOLEST/HDLC SERPL: 2.4 {RATIO} (ref 2–5)
HDLC SERPL-MCNC: 60 MG/DL (ref 40–75)
HDLC SERPL: 42.6 % (ref 20–50)
LDLC SERPL CALC-MCNC: 60.2 MG/DL (ref 63–159)
NONHDLC SERPL-MCNC: 81 MG/DL
TRIGL SERPL-MCNC: 104 MG/DL (ref 30–150)
TSH SERPL DL<=0.005 MIU/L-ACNC: 0.79 UIU/ML (ref 0.4–4)

## 2021-06-08 ENCOUNTER — PATIENT MESSAGE (OUTPATIENT)
Dept: RHEUMATOLOGY | Facility: CLINIC | Age: 31
End: 2021-06-08

## 2021-06-08 DIAGNOSIS — M32.9 SYSTEMIC LUPUS ERYTHEMATOSUS, UNSPECIFIED SLE TYPE, UNSPECIFIED ORGAN INVOLVEMENT STATUS: Primary | ICD-10-CM

## 2021-06-09 RX ORDER — IBUPROFEN AND FAMOTIDINE 26.6; 8 MG/1; MG/1
1 TABLET ORAL 3 TIMES DAILY PRN
Qty: 90 TABLET | Refills: 3 | Status: SHIPPED | OUTPATIENT
Start: 2021-06-09 | End: 2021-11-04 | Stop reason: SDUPTHER

## 2021-06-18 ENCOUNTER — TELEPHONE (OUTPATIENT)
Dept: RHEUMATOLOGY | Facility: CLINIC | Age: 31
End: 2021-06-18

## 2021-06-21 ENCOUNTER — TELEPHONE (OUTPATIENT)
Dept: FAMILY MEDICINE | Facility: CLINIC | Age: 31
End: 2021-06-21

## 2021-06-23 ENCOUNTER — CLINICAL SUPPORT (OUTPATIENT)
Dept: FAMILY MEDICINE | Facility: CLINIC | Age: 31
End: 2021-06-23
Payer: COMMERCIAL

## 2021-06-23 ENCOUNTER — PATIENT MESSAGE (OUTPATIENT)
Dept: RHEUMATOLOGY | Facility: CLINIC | Age: 31
End: 2021-06-23

## 2021-06-23 ENCOUNTER — TELEPHONE (OUTPATIENT)
Dept: FAMILY MEDICINE | Facility: CLINIC | Age: 31
End: 2021-06-23

## 2021-06-23 DIAGNOSIS — Z23 NEED FOR HEPATITIS B VACCINATION: Primary | ICD-10-CM

## 2021-06-23 PROCEDURE — 99499 UNLISTED E&M SERVICE: CPT | Mod: S$GLB,,, | Performed by: FAMILY MEDICINE

## 2021-06-23 PROCEDURE — 90471 IMMUNIZATION ADMIN: CPT | Mod: S$GLB,,, | Performed by: FAMILY MEDICINE

## 2021-06-23 PROCEDURE — 99499 NO LOS: ICD-10-PCS | Mod: S$GLB,,, | Performed by: FAMILY MEDICINE

## 2021-06-23 PROCEDURE — 90471 HEPATITIS B VACCINE ADULT IM: ICD-10-PCS | Mod: S$GLB,,, | Performed by: FAMILY MEDICINE

## 2021-06-23 PROCEDURE — 90746 HEPATITIS B VACCINE ADULT IM: ICD-10-PCS | Mod: S$GLB,,, | Performed by: FAMILY MEDICINE

## 2021-06-23 PROCEDURE — 90746 HEPB VACCINE 3 DOSE ADULT IM: CPT | Mod: S$GLB,,, | Performed by: FAMILY MEDICINE

## 2021-06-29 ENCOUNTER — PATIENT OUTREACH (OUTPATIENT)
Dept: ADMINISTRATIVE | Facility: HOSPITAL | Age: 31
End: 2021-06-29

## 2021-07-20 ENCOUNTER — PATIENT MESSAGE (OUTPATIENT)
Dept: FAMILY MEDICINE | Facility: CLINIC | Age: 31
End: 2021-07-20

## 2021-07-20 DIAGNOSIS — F90.2 ADHD (ATTENTION DEFICIT HYPERACTIVITY DISORDER), COMBINED TYPE: Primary | ICD-10-CM

## 2021-07-23 RX ORDER — DEXTROAMPHETAMINE SACCHARATE, AMPHETAMINE ASPARTATE, DEXTROAMPHETAMINE SULFATE AND AMPHETAMINE SULFATE 2.5; 2.5; 2.5; 2.5 MG/1; MG/1; MG/1; MG/1
1 TABLET ORAL DAILY PRN
Qty: 30 TABLET | Refills: 0 | Status: SHIPPED | OUTPATIENT
Start: 2021-07-23 | End: 2021-11-29

## 2021-07-30 ENCOUNTER — IMMUNIZATION (OUTPATIENT)
Dept: FAMILY MEDICINE | Facility: CLINIC | Age: 31
End: 2021-07-30
Payer: COMMERCIAL

## 2021-07-30 DIAGNOSIS — Z23 NEED FOR VACCINATION: Primary | ICD-10-CM

## 2021-07-30 PROCEDURE — 91300 COVID-19, MRNA, LNP-S, PF, 30 MCG/0.3 ML DOSE VACCINE: CPT | Mod: PBBFAC | Performed by: FAMILY MEDICINE

## 2021-08-17 ENCOUNTER — OFFICE VISIT (OUTPATIENT)
Dept: NEUROLOGY | Facility: CLINIC | Age: 31
End: 2021-08-17
Payer: COMMERCIAL

## 2021-08-17 DIAGNOSIS — R29.810 FACIAL WEAKNESS: Primary | ICD-10-CM

## 2021-08-17 PROCEDURE — 99215 PR OFFICE/OUTPT VISIT, EST, LEVL V, 40-54 MIN: ICD-10-PCS | Mod: 95,,, | Performed by: NURSE PRACTITIONER

## 2021-08-17 PROCEDURE — 99215 OFFICE O/P EST HI 40 MIN: CPT | Mod: 95,,, | Performed by: NURSE PRACTITIONER

## 2021-08-17 RX ORDER — FLUOXETINE 10 MG/1
10 TABLET ORAL DAILY
COMMUNITY
End: 2021-09-29 | Stop reason: DRUGHIGH

## 2021-08-19 PROBLEM — R29.810 FACIAL WEAKNESS: Status: ACTIVE | Noted: 2021-08-19

## 2021-08-25 ENCOUNTER — OFFICE VISIT (OUTPATIENT)
Dept: URGENT CARE | Facility: CLINIC | Age: 31
End: 2021-08-25
Payer: COMMERCIAL

## 2021-08-25 VITALS
OXYGEN SATURATION: 99 % | BODY MASS INDEX: 21.44 KG/M2 | WEIGHT: 121 LBS | SYSTOLIC BLOOD PRESSURE: 112 MMHG | HEIGHT: 63 IN | HEART RATE: 79 BPM | TEMPERATURE: 98 F | DIASTOLIC BLOOD PRESSURE: 77 MMHG

## 2021-08-25 DIAGNOSIS — Z20.828 EXPOSURE TO SARS-ASSOCIATED CORONAVIRUS: ICD-10-CM

## 2021-08-25 DIAGNOSIS — R05.9 COUGH: Primary | ICD-10-CM

## 2021-08-25 LAB
CTP QC/QA: YES
SARS-COV-2 RDRP RESP QL NAA+PROBE: NEGATIVE

## 2021-08-25 PROCEDURE — U0002 COVID-19 LAB TEST NON-CDC: HCPCS | Mod: QW,S$GLB,, | Performed by: INTERNAL MEDICINE

## 2021-08-25 PROCEDURE — 99213 PR OFFICE/OUTPT VISIT, EST, LEVL III, 20-29 MIN: ICD-10-PCS | Mod: S$GLB,,, | Performed by: INTERNAL MEDICINE

## 2021-08-25 PROCEDURE — U0002: ICD-10-PCS | Mod: QW,S$GLB,, | Performed by: INTERNAL MEDICINE

## 2021-08-25 PROCEDURE — 99213 OFFICE O/P EST LOW 20 MIN: CPT | Mod: S$GLB,,, | Performed by: INTERNAL MEDICINE

## 2021-09-08 ENCOUNTER — TELEPHONE (OUTPATIENT)
Dept: RHEUMATOLOGY | Facility: CLINIC | Age: 31
End: 2021-09-08

## 2021-09-08 ENCOUNTER — TELEPHONE (OUTPATIENT)
Dept: NEUROLOGY | Facility: CLINIC | Age: 31
End: 2021-09-08

## 2021-09-09 ENCOUNTER — TELEPHONE (OUTPATIENT)
Dept: NEUROLOGY | Facility: CLINIC | Age: 31
End: 2021-09-09

## 2021-09-10 ENCOUNTER — TELEPHONE (OUTPATIENT)
Dept: FAMILY MEDICINE | Facility: CLINIC | Age: 31
End: 2021-09-10

## 2021-09-11 ENCOUNTER — PATIENT MESSAGE (OUTPATIENT)
Dept: NEUROLOGY | Facility: CLINIC | Age: 31
End: 2021-09-11

## 2021-09-13 ENCOUNTER — PATIENT MESSAGE (OUTPATIENT)
Dept: FAMILY MEDICINE | Facility: CLINIC | Age: 31
End: 2021-09-13

## 2021-09-14 ENCOUNTER — OFFICE VISIT (OUTPATIENT)
Dept: FAMILY MEDICINE | Facility: CLINIC | Age: 31
End: 2021-09-14
Payer: COMMERCIAL

## 2021-09-14 VITALS
OXYGEN SATURATION: 99 % | WEIGHT: 119.69 LBS | HEIGHT: 63 IN | BODY MASS INDEX: 21.21 KG/M2 | SYSTOLIC BLOOD PRESSURE: 110 MMHG | HEART RATE: 95 BPM | DIASTOLIC BLOOD PRESSURE: 60 MMHG

## 2021-09-14 DIAGNOSIS — R29.810 FACIAL WEAKNESS: ICD-10-CM

## 2021-09-14 DIAGNOSIS — F41.9 ANXIETY: Primary | ICD-10-CM

## 2021-09-14 DIAGNOSIS — F90.2 ADHD (ATTENTION DEFICIT HYPERACTIVITY DISORDER), COMBINED TYPE: ICD-10-CM

## 2021-09-14 PROCEDURE — 99214 PR OFFICE/OUTPT VISIT, EST, LEVL IV, 30-39 MIN: ICD-10-PCS | Mod: S$GLB,,, | Performed by: PHYSICIAN ASSISTANT

## 2021-09-14 PROCEDURE — 99999 PR PBB SHADOW E&M-EST. PATIENT-LVL IV: ICD-10-PCS | Mod: PBBFAC,,, | Performed by: PHYSICIAN ASSISTANT

## 2021-09-14 PROCEDURE — 99999 PR PBB SHADOW E&M-EST. PATIENT-LVL IV: CPT | Mod: PBBFAC,,, | Performed by: PHYSICIAN ASSISTANT

## 2021-09-14 PROCEDURE — 99214 OFFICE O/P EST MOD 30 MIN: CPT | Mod: S$GLB,,, | Performed by: PHYSICIAN ASSISTANT

## 2021-09-15 ENCOUNTER — LAB VISIT (OUTPATIENT)
Dept: LAB | Facility: HOSPITAL | Age: 31
End: 2021-09-15
Attending: NURSE PRACTITIONER
Payer: COMMERCIAL

## 2021-09-15 ENCOUNTER — HOSPITAL ENCOUNTER (OUTPATIENT)
Dept: RADIOLOGY | Facility: HOSPITAL | Age: 31
Discharge: HOME | End: 2021-09-15
Attending: NURSE PRACTITIONER
Payer: COMMERCIAL

## 2021-09-15 DIAGNOSIS — R29.810 FACIAL WEAKNESS: ICD-10-CM

## 2021-09-15 LAB
CREAT SERPL-MCNC: 0.6 MG/DL (ref 0.5–1.4)
CRP SERPL-MCNC: 0.3 MG/L (ref 0–8.2)
ERYTHROCYTE [SEDIMENTATION RATE] IN BLOOD BY WESTERGREN METHOD: 5 MM/HR (ref 0–20)
EST. GFR  (AFRICAN AMERICAN): >60 ML/MIN/1.73 M^2
EST. GFR  (NON AFRICAN AMERICAN): >60 ML/MIN/1.73 M^2

## 2021-09-15 PROCEDURE — 70553 MRI BRAIN W WO CONTRAST: ICD-10-PCS | Mod: 26,,, | Performed by: RADIOLOGY

## 2021-09-15 PROCEDURE — 86140 C-REACTIVE PROTEIN: CPT | Performed by: NURSE PRACTITIONER

## 2021-09-15 PROCEDURE — 82565 ASSAY OF CREATININE: CPT | Performed by: NURSE PRACTITIONER

## 2021-09-15 PROCEDURE — 85651 RBC SED RATE NONAUTOMATED: CPT | Mod: PO | Performed by: NURSE PRACTITIONER

## 2021-09-15 PROCEDURE — 25500020 PHARM REV CODE 255: Performed by: NURSE PRACTITIONER

## 2021-09-15 PROCEDURE — 70553 MRI BRAIN STEM W/O & W/DYE: CPT | Mod: 26,,, | Performed by: RADIOLOGY

## 2021-09-15 PROCEDURE — 36415 COLL VENOUS BLD VENIPUNCTURE: CPT | Mod: PO | Performed by: NURSE PRACTITIONER

## 2021-09-15 PROCEDURE — 70553 MRI BRAIN STEM W/O & W/DYE: CPT | Mod: TC

## 2021-09-15 PROCEDURE — A9585 GADOBUTROL INJECTION: HCPCS | Performed by: NURSE PRACTITIONER

## 2021-09-15 RX ORDER — GADOBUTROL 604.72 MG/ML
6 INJECTION INTRAVENOUS
Status: COMPLETED | OUTPATIENT
Start: 2021-09-15 | End: 2021-09-15

## 2021-09-15 RX ADMIN — GADOBUTROL 6 ML: 604.72 INJECTION INTRAVENOUS at 08:09

## 2021-09-20 ENCOUNTER — LAB VISIT (OUTPATIENT)
Dept: LAB | Facility: HOSPITAL | Age: 31
End: 2021-09-20
Attending: INTERNAL MEDICINE
Payer: COMMERCIAL

## 2021-09-20 DIAGNOSIS — R19.7 DIARRHEA: Primary | ICD-10-CM

## 2021-09-20 PROCEDURE — 83993 ASSAY FOR CALPROTECTIN FECAL: CPT | Performed by: INTERNAL MEDICINE

## 2021-09-20 PROCEDURE — 87046 STOOL CULTR AEROBIC BACT EA: CPT | Mod: 59 | Performed by: INTERNAL MEDICINE

## 2021-09-20 PROCEDURE — 82656 EL-1 FECAL QUAL/SEMIQ: CPT | Performed by: INTERNAL MEDICINE

## 2021-09-20 PROCEDURE — 87329 GIARDIA AG IA: CPT | Performed by: INTERNAL MEDICINE

## 2021-09-20 PROCEDURE — 87045 FECES CULTURE AEROBIC BACT: CPT | Performed by: INTERNAL MEDICINE

## 2021-09-20 PROCEDURE — 87427 SHIGA-LIKE TOXIN AG IA: CPT | Mod: 59 | Performed by: INTERNAL MEDICINE

## 2021-09-21 LAB
CRYPTOSP AG STL QL IA: NEGATIVE
G LAMBLIA AG STL QL IA: NEGATIVE

## 2021-09-22 ENCOUNTER — OFFICE VISIT (OUTPATIENT)
Dept: FAMILY MEDICINE | Facility: CLINIC | Age: 31
End: 2021-09-22
Payer: COMMERCIAL

## 2021-09-22 VITALS
WEIGHT: 116.38 LBS | HEART RATE: 79 BPM | SYSTOLIC BLOOD PRESSURE: 112 MMHG | OXYGEN SATURATION: 99 % | HEIGHT: 63 IN | BODY MASS INDEX: 20.62 KG/M2 | DIASTOLIC BLOOD PRESSURE: 64 MMHG

## 2021-09-22 DIAGNOSIS — F41.9 ANXIETY: Primary | ICD-10-CM

## 2021-09-22 DIAGNOSIS — K58.0 IRRITABLE BOWEL SYNDROME WITH DIARRHEA: ICD-10-CM

## 2021-09-22 LAB
E COLI SXT1 STL QL IA: NEGATIVE
E COLI SXT2 STL QL IA: NEGATIVE

## 2021-09-22 PROCEDURE — 99214 OFFICE O/P EST MOD 30 MIN: CPT | Mod: S$GLB,,, | Performed by: PHYSICIAN ASSISTANT

## 2021-09-22 PROCEDURE — 99999 PR PBB SHADOW E&M-EST. PATIENT-LVL IV: ICD-10-PCS | Mod: PBBFAC,,, | Performed by: PHYSICIAN ASSISTANT

## 2021-09-22 PROCEDURE — 99214 PR OFFICE/OUTPT VISIT, EST, LEVL IV, 30-39 MIN: ICD-10-PCS | Mod: S$GLB,,, | Performed by: PHYSICIAN ASSISTANT

## 2021-09-22 PROCEDURE — 99999 PR PBB SHADOW E&M-EST. PATIENT-LVL IV: CPT | Mod: PBBFAC,,, | Performed by: PHYSICIAN ASSISTANT

## 2021-09-23 ENCOUNTER — OFFICE VISIT (OUTPATIENT)
Dept: PSYCHIATRY | Facility: CLINIC | Age: 31
End: 2021-09-23
Payer: COMMERCIAL

## 2021-09-23 ENCOUNTER — PATIENT MESSAGE (OUTPATIENT)
Dept: FAMILY MEDICINE | Facility: CLINIC | Age: 31
End: 2021-09-23

## 2021-09-23 DIAGNOSIS — F41.0 PANIC DISORDER WITHOUT AGORAPHOBIA: Primary | ICD-10-CM

## 2021-09-23 DIAGNOSIS — F43.10 PTSD (POST-TRAUMATIC STRESS DISORDER): ICD-10-CM

## 2021-09-23 LAB — BACTERIA STL CULT: NORMAL

## 2021-09-23 PROCEDURE — 90791 PSYCH DIAGNOSTIC EVALUATION: CPT | Mod: S$GLB,,, | Performed by: PSYCHOLOGIST

## 2021-09-23 PROCEDURE — 90791 PR PSYCHIATRIC DIAGNOSTIC EVALUATION: ICD-10-PCS | Mod: S$GLB,,, | Performed by: PSYCHOLOGIST

## 2021-09-23 PROCEDURE — 99999 PR PBB SHADOW E&M-EST. PATIENT-LVL I: ICD-10-PCS | Mod: PBBFAC,,, | Performed by: PSYCHOLOGIST

## 2021-09-23 PROCEDURE — 99999 PR PBB SHADOW E&M-EST. PATIENT-LVL I: CPT | Mod: PBBFAC,,, | Performed by: PSYCHOLOGIST

## 2021-09-24 ENCOUNTER — PATIENT MESSAGE (OUTPATIENT)
Dept: PSYCHIATRY | Facility: CLINIC | Age: 31
End: 2021-09-24

## 2021-09-24 LAB — ELASTASE 1, FECAL: >500 MCG/G

## 2021-09-27 ENCOUNTER — TELEPHONE (OUTPATIENT)
Dept: PSYCHIATRY | Facility: CLINIC | Age: 31
End: 2021-09-27

## 2021-09-27 LAB — CALPROTECTIN STL-MCNT: 33.4 MCG/G

## 2021-09-28 ENCOUNTER — OFFICE VISIT (OUTPATIENT)
Dept: PSYCHIATRY | Facility: CLINIC | Age: 31
End: 2021-09-28
Payer: COMMERCIAL

## 2021-09-28 DIAGNOSIS — F41.0 PANIC DISORDER WITHOUT AGORAPHOBIA: ICD-10-CM

## 2021-09-28 DIAGNOSIS — F43.10 PTSD (POST-TRAUMATIC STRESS DISORDER): Primary | ICD-10-CM

## 2021-09-28 PROCEDURE — 90834 PR PSYCHOTHERAPY W/PATIENT, 45 MIN: ICD-10-PCS | Mod: S$GLB,,, | Performed by: PSYCHOLOGIST

## 2021-09-28 PROCEDURE — 99999 PR PBB SHADOW E&M-EST. PATIENT-LVL I: ICD-10-PCS | Mod: PBBFAC,,, | Performed by: PSYCHOLOGIST

## 2021-09-28 PROCEDURE — 99999 PR PBB SHADOW E&M-EST. PATIENT-LVL I: CPT | Mod: PBBFAC,,, | Performed by: PSYCHOLOGIST

## 2021-09-28 PROCEDURE — 90834 PSYTX W PT 45 MINUTES: CPT | Mod: S$GLB,,, | Performed by: PSYCHOLOGIST

## 2021-09-29 ENCOUNTER — PATIENT MESSAGE (OUTPATIENT)
Dept: FAMILY MEDICINE | Facility: CLINIC | Age: 31
End: 2021-09-29

## 2021-09-29 RX ORDER — FLUOXETINE HYDROCHLORIDE 20 MG/1
20 CAPSULE ORAL DAILY
Qty: 30 CAPSULE | Refills: 11 | Status: SHIPPED | OUTPATIENT
Start: 2021-09-29 | End: 2022-09-12 | Stop reason: SDUPTHER

## 2021-09-30 ENCOUNTER — PATIENT MESSAGE (OUTPATIENT)
Dept: PSYCHIATRY | Facility: CLINIC | Age: 31
End: 2021-09-30

## 2021-09-30 ENCOUNTER — OFFICE VISIT (OUTPATIENT)
Dept: FAMILY MEDICINE | Facility: CLINIC | Age: 31
End: 2021-09-30
Payer: COMMERCIAL

## 2021-09-30 ENCOUNTER — TELEPHONE (OUTPATIENT)
Dept: PSYCHIATRY | Facility: CLINIC | Age: 31
End: 2021-09-30

## 2021-09-30 VITALS
BODY MASS INDEX: 21.61 KG/M2 | WEIGHT: 121.94 LBS | HEART RATE: 91 BPM | SYSTOLIC BLOOD PRESSURE: 120 MMHG | DIASTOLIC BLOOD PRESSURE: 80 MMHG | OXYGEN SATURATION: 98 % | HEIGHT: 63 IN

## 2021-09-30 DIAGNOSIS — F90.2 ADHD (ATTENTION DEFICIT HYPERACTIVITY DISORDER), COMBINED TYPE: ICD-10-CM

## 2021-09-30 DIAGNOSIS — F41.9 ANXIETY: Primary | ICD-10-CM

## 2021-09-30 PROBLEM — Z20.828 EXPOSURE TO SARS-ASSOCIATED CORONAVIRUS: Status: RESOLVED | Noted: 2021-08-25 | Resolved: 2021-09-30

## 2021-09-30 PROCEDURE — 99999 PR PBB SHADOW E&M-EST. PATIENT-LVL III: ICD-10-PCS | Mod: PBBFAC,,, | Performed by: FAMILY MEDICINE

## 2021-09-30 PROCEDURE — 99999 PR PBB SHADOW E&M-EST. PATIENT-LVL III: CPT | Mod: PBBFAC,,, | Performed by: FAMILY MEDICINE

## 2021-09-30 PROCEDURE — 99213 PR OFFICE/OUTPT VISIT, EST, LEVL III, 20-29 MIN: ICD-10-PCS | Mod: S$GLB,,, | Performed by: FAMILY MEDICINE

## 2021-09-30 PROCEDURE — 99213 OFFICE O/P EST LOW 20 MIN: CPT | Mod: S$GLB,,, | Performed by: FAMILY MEDICINE

## 2021-09-30 RX ORDER — FLUTICASONE PROPIONATE 50 MCG
1 SPRAY, SUSPENSION (ML) NASAL DAILY
Qty: 16 G | Refills: 1 | Status: SHIPPED | OUTPATIENT
Start: 2021-09-30 | End: 2021-11-29 | Stop reason: SDUPTHER

## 2021-10-12 ENCOUNTER — OFFICE VISIT (OUTPATIENT)
Dept: PSYCHIATRY | Facility: CLINIC | Age: 31
End: 2021-10-12
Payer: COMMERCIAL

## 2021-10-12 DIAGNOSIS — F43.10 PTSD (POST-TRAUMATIC STRESS DISORDER): Primary | ICD-10-CM

## 2021-10-12 DIAGNOSIS — F41.0 PANIC DISORDER WITHOUT AGORAPHOBIA: ICD-10-CM

## 2021-10-12 PROCEDURE — 90837 PSYTX W PT 60 MINUTES: CPT | Mod: S$GLB,,, | Performed by: PSYCHOLOGIST

## 2021-10-12 PROCEDURE — 90837 PR PSYCHOTHERAPY W/PATIENT, 60 MIN: ICD-10-PCS | Mod: S$GLB,,, | Performed by: PSYCHOLOGIST

## 2021-10-20 ENCOUNTER — PATIENT MESSAGE (OUTPATIENT)
Dept: FAMILY MEDICINE | Facility: CLINIC | Age: 31
End: 2021-10-20

## 2021-10-27 ENCOUNTER — PATIENT MESSAGE (OUTPATIENT)
Dept: PSYCHIATRY | Facility: CLINIC | Age: 31
End: 2021-10-27
Payer: COMMERCIAL

## 2021-10-28 ENCOUNTER — OFFICE VISIT (OUTPATIENT)
Dept: PSYCHIATRY | Facility: CLINIC | Age: 31
End: 2021-10-28
Payer: COMMERCIAL

## 2021-10-28 DIAGNOSIS — F41.0 PANIC DISORDER WITHOUT AGORAPHOBIA: ICD-10-CM

## 2021-10-28 DIAGNOSIS — F43.10 PTSD (POST-TRAUMATIC STRESS DISORDER): ICD-10-CM

## 2021-10-28 DIAGNOSIS — F41.1 GAD (GENERALIZED ANXIETY DISORDER): Primary | ICD-10-CM

## 2021-10-28 PROCEDURE — 99999 PR PBB SHADOW E&M-EST. PATIENT-LVL I: ICD-10-PCS | Mod: PBBFAC,,, | Performed by: PSYCHOLOGIST

## 2021-10-28 PROCEDURE — 90837 PR PSYCHOTHERAPY W/PATIENT, 60 MIN: ICD-10-PCS | Mod: S$GLB,,, | Performed by: PSYCHOLOGIST

## 2021-10-28 PROCEDURE — 99999 PR PBB SHADOW E&M-EST. PATIENT-LVL I: CPT | Mod: PBBFAC,,, | Performed by: PSYCHOLOGIST

## 2021-10-28 PROCEDURE — 90837 PSYTX W PT 60 MINUTES: CPT | Mod: S$GLB,,, | Performed by: PSYCHOLOGIST

## 2021-11-04 ENCOUNTER — OFFICE VISIT (OUTPATIENT)
Dept: RHEUMATOLOGY | Facility: CLINIC | Age: 31
End: 2021-11-04
Payer: COMMERCIAL

## 2021-11-04 VITALS
HEART RATE: 80 BPM | SYSTOLIC BLOOD PRESSURE: 106 MMHG | HEIGHT: 63 IN | BODY MASS INDEX: 21.79 KG/M2 | WEIGHT: 123 LBS | DIASTOLIC BLOOD PRESSURE: 70 MMHG

## 2021-11-04 DIAGNOSIS — R76.8 THYROGLOBULIN ANTIBODY POSITIVE: ICD-10-CM

## 2021-11-04 DIAGNOSIS — M25.562 CHRONIC PAIN OF BOTH KNEES: ICD-10-CM

## 2021-11-04 DIAGNOSIS — E06.3 HASHIMOTO'S THYROIDITIS: ICD-10-CM

## 2021-11-04 DIAGNOSIS — M25.561 CHRONIC PAIN OF BOTH KNEES: ICD-10-CM

## 2021-11-04 DIAGNOSIS — M32.9 SYSTEMIC LUPUS ERYTHEMATOSUS, UNSPECIFIED SLE TYPE, UNSPECIFIED ORGAN INVOLVEMENT STATUS: Primary | ICD-10-CM

## 2021-11-04 DIAGNOSIS — G89.29 CHRONIC PAIN OF BOTH KNEES: ICD-10-CM

## 2021-11-04 DIAGNOSIS — F41.9 ANXIETY: ICD-10-CM

## 2021-11-04 DIAGNOSIS — F43.10 PTSD (POST-TRAUMATIC STRESS DISORDER): ICD-10-CM

## 2021-11-04 PROCEDURE — 99215 PR OFFICE/OUTPT VISIT, EST, LEVL V, 40-54 MIN: ICD-10-PCS | Mod: S$GLB,,, | Performed by: INTERNAL MEDICINE

## 2021-11-04 PROCEDURE — 99999 PR PBB SHADOW E&M-EST. PATIENT-LVL III: CPT | Mod: PBBFAC,,, | Performed by: INTERNAL MEDICINE

## 2021-11-04 PROCEDURE — 99999 PR PBB SHADOW E&M-EST. PATIENT-LVL III: ICD-10-PCS | Mod: PBBFAC,,, | Performed by: INTERNAL MEDICINE

## 2021-11-04 PROCEDURE — 99215 OFFICE O/P EST HI 40 MIN: CPT | Mod: S$GLB,,, | Performed by: INTERNAL MEDICINE

## 2021-11-04 RX ORDER — IBUPROFEN AND FAMOTIDINE 26.6; 8 MG/1; MG/1
1 TABLET ORAL 3 TIMES DAILY PRN
Qty: 90 TABLET | Refills: 12 | Status: SHIPPED | OUTPATIENT
Start: 2021-11-04

## 2021-11-04 RX ORDER — ALPRAZOLAM 0.25 MG/1
0.25 TABLET ORAL 4 TIMES DAILY PRN
Qty: 28 TABLET | Refills: 3 | Status: SHIPPED | OUTPATIENT
Start: 2021-11-04 | End: 2021-11-29

## 2021-11-04 ASSESSMENT — ROUTINE ASSESSMENT OF PATIENT INDEX DATA (RAPID3)
PAIN SCORE: 2
FATIGUE SCORE: 2.2
TOTAL RAPID3 SCORE: 2.78
PATIENT GLOBAL ASSESSMENT SCORE: 5
MDHAQ FUNCTION SCORE: 0.4
PSYCHOLOGICAL DISTRESS SCORE: 3.3

## 2021-11-16 ENCOUNTER — PATIENT MESSAGE (OUTPATIENT)
Dept: PSYCHIATRY | Facility: CLINIC | Age: 31
End: 2021-11-16
Payer: COMMERCIAL

## 2021-11-22 ENCOUNTER — PATIENT MESSAGE (OUTPATIENT)
Dept: PSYCHIATRY | Facility: CLINIC | Age: 31
End: 2021-11-22
Payer: COMMERCIAL

## 2021-11-29 ENCOUNTER — OFFICE VISIT (OUTPATIENT)
Dept: FAMILY MEDICINE | Facility: CLINIC | Age: 31
End: 2021-11-29
Payer: COMMERCIAL

## 2021-11-29 ENCOUNTER — LAB VISIT (OUTPATIENT)
Dept: LAB | Facility: HOSPITAL | Age: 31
End: 2021-11-29
Attending: INTERNAL MEDICINE
Payer: COMMERCIAL

## 2021-11-29 VITALS
OXYGEN SATURATION: 99 % | WEIGHT: 126.56 LBS | BODY MASS INDEX: 21.61 KG/M2 | TEMPERATURE: 98 F | DIASTOLIC BLOOD PRESSURE: 66 MMHG | HEIGHT: 64 IN | HEART RATE: 82 BPM | SYSTOLIC BLOOD PRESSURE: 106 MMHG

## 2021-11-29 DIAGNOSIS — M32.9 SYSTEMIC LUPUS ERYTHEMATOSUS, UNSPECIFIED SLE TYPE, UNSPECIFIED ORGAN INVOLVEMENT STATUS: ICD-10-CM

## 2021-11-29 DIAGNOSIS — M25.561 CHRONIC PAIN OF BOTH KNEES: ICD-10-CM

## 2021-11-29 DIAGNOSIS — F41.9 ANXIETY: ICD-10-CM

## 2021-11-29 DIAGNOSIS — R76.8 THYROGLOBULIN ANTIBODY POSITIVE: ICD-10-CM

## 2021-11-29 DIAGNOSIS — F43.10 PTSD (POST-TRAUMATIC STRESS DISORDER): ICD-10-CM

## 2021-11-29 DIAGNOSIS — E06.3 HASHIMOTO'S THYROIDITIS: ICD-10-CM

## 2021-11-29 DIAGNOSIS — F41.9 ANXIETY: Primary | ICD-10-CM

## 2021-11-29 DIAGNOSIS — M25.562 CHRONIC PAIN OF BOTH KNEES: ICD-10-CM

## 2021-11-29 DIAGNOSIS — G89.29 CHRONIC PAIN OF BOTH KNEES: ICD-10-CM

## 2021-11-29 LAB
25(OH)D3+25(OH)D2 SERPL-MCNC: 29 NG/ML (ref 30–96)
ALBUMIN SERPL BCP-MCNC: 4.5 G/DL (ref 3.5–5.2)
ALP SERPL-CCNC: 66 U/L (ref 55–135)
ALT SERPL W/O P-5'-P-CCNC: 9 U/L (ref 10–44)
ANION GAP SERPL CALC-SCNC: 9 MMOL/L (ref 8–16)
AST SERPL-CCNC: 14 U/L (ref 10–40)
BASOPHILS # BLD AUTO: 0.06 K/UL (ref 0–0.2)
BASOPHILS NFR BLD: 1 % (ref 0–1.9)
BILIRUB SERPL-MCNC: 0.6 MG/DL (ref 0.1–1)
BUN SERPL-MCNC: 17 MG/DL (ref 6–20)
CALCIUM SERPL-MCNC: 9.6 MG/DL (ref 8.7–10.5)
CHLORIDE SERPL-SCNC: 105 MMOL/L (ref 95–110)
CO2 SERPL-SCNC: 25 MMOL/L (ref 23–29)
CREAT SERPL-MCNC: 0.8 MG/DL (ref 0.5–1.4)
CRP SERPL-MCNC: 0.7 MG/L (ref 0–8.2)
DIFFERENTIAL METHOD: ABNORMAL
EOSINOPHIL # BLD AUTO: 0.1 K/UL (ref 0–0.5)
EOSINOPHIL NFR BLD: 1.9 % (ref 0–8)
ERYTHROCYTE [DISTWIDTH] IN BLOOD BY AUTOMATED COUNT: 12.1 % (ref 11.5–14.5)
ERYTHROCYTE [SEDIMENTATION RATE] IN BLOOD BY WESTERGREN METHOD: 5 MM/HR (ref 0–20)
EST. GFR  (AFRICAN AMERICAN): >60 ML/MIN/1.73 M^2
EST. GFR  (NON AFRICAN AMERICAN): >60 ML/MIN/1.73 M^2
GLUCOSE SERPL-MCNC: 53 MG/DL (ref 70–110)
HCT VFR BLD AUTO: 43.4 % (ref 37–48.5)
HGB BLD-MCNC: 13.6 G/DL (ref 12–16)
IMM GRANULOCYTES # BLD AUTO: 0.02 K/UL (ref 0–0.04)
IMM GRANULOCYTES NFR BLD AUTO: 0.3 % (ref 0–0.5)
LYMPHOCYTES # BLD AUTO: 1.2 K/UL (ref 1–4.8)
LYMPHOCYTES NFR BLD: 20 % (ref 18–48)
MCH RBC QN AUTO: 30 PG (ref 27–31)
MCHC RBC AUTO-ENTMCNC: 31.3 G/DL (ref 32–36)
MCV RBC AUTO: 96 FL (ref 82–98)
MONOCYTES # BLD AUTO: 0.3 K/UL (ref 0.3–1)
MONOCYTES NFR BLD: 5.5 % (ref 4–15)
NEUTROPHILS # BLD AUTO: 4.1 K/UL (ref 1.8–7.7)
NEUTROPHILS NFR BLD: 71.3 % (ref 38–73)
NRBC BLD-RTO: 0 /100 WBC
PLATELET # BLD AUTO: 217 K/UL (ref 150–450)
PMV BLD AUTO: 11.3 FL (ref 9.2–12.9)
POTASSIUM SERPL-SCNC: 3.8 MMOL/L (ref 3.5–5.1)
PROT SERPL-MCNC: 8 G/DL (ref 6–8.4)
RBC # BLD AUTO: 4.54 M/UL (ref 4–5.4)
SODIUM SERPL-SCNC: 139 MMOL/L (ref 136–145)
WBC # BLD AUTO: 5.81 K/UL (ref 3.9–12.7)

## 2021-11-29 PROCEDURE — 86038 ANTINUCLEAR ANTIBODIES: CPT | Performed by: INTERNAL MEDICINE

## 2021-11-29 PROCEDURE — 99213 OFFICE O/P EST LOW 20 MIN: CPT | Mod: S$GLB,,, | Performed by: FAMILY MEDICINE

## 2021-11-29 PROCEDURE — 99999 PR PBB SHADOW E&M-EST. PATIENT-LVL III: ICD-10-PCS | Mod: PBBFAC,,, | Performed by: FAMILY MEDICINE

## 2021-11-29 PROCEDURE — 86235 NUCLEAR ANTIGEN ANTIBODY: CPT | Performed by: INTERNAL MEDICINE

## 2021-11-29 PROCEDURE — 36415 COLL VENOUS BLD VENIPUNCTURE: CPT | Mod: PO | Performed by: INTERNAL MEDICINE

## 2021-11-29 PROCEDURE — 86140 C-REACTIVE PROTEIN: CPT | Performed by: INTERNAL MEDICINE

## 2021-11-29 PROCEDURE — 86235 NUCLEAR ANTIGEN ANTIBODY: CPT | Mod: 59 | Performed by: INTERNAL MEDICINE

## 2021-11-29 PROCEDURE — 99999 PR PBB SHADOW E&M-EST. PATIENT-LVL III: CPT | Mod: PBBFAC,,, | Performed by: FAMILY MEDICINE

## 2021-11-29 PROCEDURE — 85025 COMPLETE CBC W/AUTO DIFF WBC: CPT | Performed by: INTERNAL MEDICINE

## 2021-11-29 PROCEDURE — 83516 IMMUNOASSAY NONANTIBODY: CPT | Performed by: INTERNAL MEDICINE

## 2021-11-29 PROCEDURE — 85651 RBC SED RATE NONAUTOMATED: CPT | Mod: PO | Performed by: INTERNAL MEDICINE

## 2021-11-29 PROCEDURE — 82306 VITAMIN D 25 HYDROXY: CPT | Performed by: INTERNAL MEDICINE

## 2021-11-29 PROCEDURE — 99213 PR OFFICE/OUTPT VISIT, EST, LEVL III, 20-29 MIN: ICD-10-PCS | Mod: S$GLB,,, | Performed by: FAMILY MEDICINE

## 2021-11-29 PROCEDURE — 86225 DNA ANTIBODY NATIVE: CPT | Performed by: INTERNAL MEDICINE

## 2021-11-29 PROCEDURE — 80053 COMPREHEN METABOLIC PANEL: CPT | Performed by: INTERNAL MEDICINE

## 2021-11-29 RX ORDER — FLUTICASONE PROPIONATE 50 MCG
1 SPRAY, SUSPENSION (ML) NASAL DAILY
Qty: 16 G | Refills: 1 | Status: SHIPPED | OUTPATIENT
Start: 2021-11-29 | End: 2022-03-07 | Stop reason: SDUPTHER

## 2021-11-30 ENCOUNTER — PATIENT MESSAGE (OUTPATIENT)
Dept: FAMILY MEDICINE | Facility: CLINIC | Age: 31
End: 2021-11-30
Payer: COMMERCIAL

## 2021-11-30 LAB
ANA SER QL IF: NORMAL
ANTI SM/RNP ANTIBODY: 0.08 RATIO (ref 0–0.99)
ANTI-SM/RNP INTERPRETATION: NEGATIVE
ANTI-SSA ANTIBODY: 0.07 RATIO (ref 0–0.99)
ANTI-SSA INTERPRETATION: NEGATIVE
ANTI-SSB ANTIBODY: 0.06 RATIO (ref 0–0.99)
ANTI-SSB INTERPRETATION: NEGATIVE
DSDNA AB SER-ACNC: NORMAL [IU]/ML

## 2021-12-01 ENCOUNTER — TELEPHONE (OUTPATIENT)
Dept: FAMILY MEDICINE | Facility: CLINIC | Age: 31
End: 2021-12-01
Payer: COMMERCIAL

## 2021-12-02 LAB — HISTONE IGG SER IA-ACNC: 0.6 UNITS (ref 0–0.9)

## 2021-12-13 ENCOUNTER — PATIENT MESSAGE (OUTPATIENT)
Dept: PSYCHIATRY | Facility: CLINIC | Age: 31
End: 2021-12-13
Payer: COMMERCIAL

## 2022-03-07 ENCOUNTER — OFFICE VISIT (OUTPATIENT)
Dept: FAMILY MEDICINE | Facility: CLINIC | Age: 32
End: 2022-03-07
Payer: COMMERCIAL

## 2022-03-07 VITALS
HEIGHT: 63 IN | BODY MASS INDEX: 24.8 KG/M2 | SYSTOLIC BLOOD PRESSURE: 116 MMHG | DIASTOLIC BLOOD PRESSURE: 62 MMHG | HEART RATE: 83 BPM | OXYGEN SATURATION: 99 % | WEIGHT: 140 LBS

## 2022-03-07 DIAGNOSIS — Z00.00 WELLNESS EXAMINATION: Primary | ICD-10-CM

## 2022-03-07 DIAGNOSIS — F41.9 ANXIETY: ICD-10-CM

## 2022-03-07 DIAGNOSIS — M54.2 CERVICALGIA: ICD-10-CM

## 2022-03-07 PROCEDURE — 99999 PR PBB SHADOW E&M-EST. PATIENT-LVL III: CPT | Mod: PBBFAC,,, | Performed by: FAMILY MEDICINE

## 2022-03-07 PROCEDURE — 99395 PR PREVENTIVE VISIT,EST,18-39: ICD-10-PCS | Mod: S$GLB,,, | Performed by: FAMILY MEDICINE

## 2022-03-07 PROCEDURE — 99395 PREV VISIT EST AGE 18-39: CPT | Mod: S$GLB,,, | Performed by: FAMILY MEDICINE

## 2022-03-07 PROCEDURE — 99999 PR PBB SHADOW E&M-EST. PATIENT-LVL III: ICD-10-PCS | Mod: PBBFAC,,, | Performed by: FAMILY MEDICINE

## 2022-03-07 RX ORDER — FLUTICASONE PROPIONATE 50 MCG
1 SPRAY, SUSPENSION (ML) NASAL DAILY
Qty: 16 G | Refills: 2 | Status: SHIPPED | OUTPATIENT
Start: 2022-03-07 | End: 2022-09-12 | Stop reason: SDUPTHER

## 2022-03-07 RX ORDER — ALPRAZOLAM 0.25 MG/1
TABLET ORAL
COMMUNITY
End: 2023-03-13 | Stop reason: SDUPTHER

## 2022-03-07 NOTE — PROGRESS NOTES
Subjective:       Patient ID: Jen Lazar is a 32 y.o. female.    Chief Complaint: Medication Refill and Neck Pain    Here for wellness and f/u anxiety. Doing well overall. Mood improved since last year.  Still with neck pain for last 2 years. Pain feels sciatic in nature with bilateral hand numbness/tingling at times.      Review of Systems   Constitutional: Negative for chills and fever.   Respiratory: Negative for cough, chest tightness and shortness of breath.    Cardiovascular: Negative for chest pain, palpitations and leg swelling.   Endocrine: Negative for cold intolerance and heat intolerance.   Musculoskeletal: Positive for arthralgias and neck pain.   Neurological: Positive for numbness.   Psychiatric/Behavioral: Negative for decreased concentration. The patient is not nervous/anxious.        Objective:      Physical Exam  Vitals and nursing note reviewed.   Constitutional:       Appearance: She is well-developed.   HENT:      Head: Normocephalic and atraumatic.   Cardiovascular:      Rate and Rhythm: Normal rate and regular rhythm.      Heart sounds: Normal heart sounds.   Pulmonary:      Effort: Pulmonary effort is normal.      Breath sounds: Normal breath sounds.   Psychiatric:         Mood and Affect: Mood normal.         Behavior: Behavior normal.         Assessment:       1. Wellness examination    2. Anxiety    3. Cervicalgia        Plan:       Wellness examination    Anxiety    Cervicalgia  -     MRI Cervical Spine Without Contrast; Future; Expected date: 03/07/2022    Other orders  -     fluticasone propionate (FLONASE) 50 mcg/actuation nasal spray; 1 spray (50 mcg total) by Each Nostril route once daily.  Dispense: 16 g; Refill: 2        Reviewed recent labs  To pt for neck pain  Will monitor chronic medical issues and continue current plan of care.      Follow up in about 6 months (around 9/7/2022), or if symptoms worsen or fail to improve.

## 2022-03-10 ENCOUNTER — PATIENT MESSAGE (OUTPATIENT)
Dept: RHEUMATOLOGY | Facility: CLINIC | Age: 32
End: 2022-03-10
Payer: COMMERCIAL

## 2022-03-10 ENCOUNTER — TELEPHONE (OUTPATIENT)
Dept: PSYCHIATRY | Facility: CLINIC | Age: 32
End: 2022-03-10
Payer: COMMERCIAL

## 2022-03-10 NOTE — TELEPHONE ENCOUNTER
LVM regarding canceling pt appt and switching to another provider due to dr valdez being out on maternity leave -JL

## 2022-03-10 NOTE — TELEPHONE ENCOUNTER
LVM regarding  Canceling appt and switching to another provider due to dr valdez going on early maternity leave -JL

## 2022-03-11 ENCOUNTER — TELEPHONE (OUTPATIENT)
Dept: FAMILY MEDICINE | Facility: CLINIC | Age: 32
End: 2022-03-11
Payer: COMMERCIAL

## 2022-03-11 NOTE — TELEPHONE ENCOUNTER
Pt's MRI is pending approval with his insurance as of now. Is this medically urgent? If not please reschedule pt until it can be approved. This could take up to 14 business days for approval.    Please advise

## 2022-04-25 ENCOUNTER — TELEPHONE (OUTPATIENT)
Dept: RHEUMATOLOGY | Facility: CLINIC | Age: 32
End: 2022-04-25
Payer: COMMERCIAL

## 2022-04-25 ENCOUNTER — TELEPHONE (OUTPATIENT)
Dept: FAMILY MEDICINE | Facility: CLINIC | Age: 32
End: 2022-04-25
Payer: COMMERCIAL

## 2022-04-25 ENCOUNTER — OFFICE VISIT (OUTPATIENT)
Dept: FAMILY MEDICINE | Facility: CLINIC | Age: 32
End: 2022-04-25
Payer: COMMERCIAL

## 2022-04-25 DIAGNOSIS — U07.1 COVID-19: Primary | ICD-10-CM

## 2022-04-25 DIAGNOSIS — M32.9 SYSTEMIC LUPUS ERYTHEMATOSUS (SLE) AFFECTING PREGNANCY, ANTEPARTUM: ICD-10-CM

## 2022-04-25 DIAGNOSIS — O99.891 SYSTEMIC LUPUS ERYTHEMATOSUS (SLE) AFFECTING PREGNANCY, ANTEPARTUM: ICD-10-CM

## 2022-04-25 PROCEDURE — 99213 PR OFFICE/OUTPT VISIT, EST, LEVL III, 20-29 MIN: ICD-10-PCS | Mod: 95,,, | Performed by: NURSE PRACTITIONER

## 2022-04-25 PROCEDURE — 99213 OFFICE O/P EST LOW 20 MIN: CPT | Mod: 95,,, | Performed by: NURSE PRACTITIONER

## 2022-04-25 NOTE — TELEPHONE ENCOUNTER
----- Message from Josette Alfaro sent at 4/25/2022 10:37 AM CDT -----  Contact: pt  Type: Same Day Appointment    Caller is requesting a same day appointment.  Caller declined first available appt listed below.    Name of caller:Pt     When is the first available appt:04/26    Symptoms:Fever,congestion,diarrhea,cough,chills,chest tightness headache chills, body aches     Best Call back number:[phone]    Additional Info: Please advise-Thank you~

## 2022-04-25 NOTE — PATIENT INSTRUCTIONS
You can retest in 5 days.  If negative no longer contagious.  If + wait 2 more days.  Isolate.  Fluids  Rest  Ibuprofen/Tylenol.    If you have concerns or questions, please do not hesitate to call.  If you have any questions, please do not hesitate to call.  You can reach us at [phone] Monday through Friday 7 a.m. to 6:30 p.m., Saturday 9 a.m. to 4:30 p.m. and Sunday 9 a.m to 2:30 p.m.  Or call Dr. Gale/ISMAEL Avila    Thank you for using the [facility]!    ION Johns, CNP, FNP-BC  [facility]    To rate your experience with JONO Johns, click on the link below:      [email or web address]

## 2022-04-25 NOTE — PROGRESS NOTES
Jen Lazar is a 32 y.o. female patient of FADUMO Gale MD who presents to the clinic today for a Virtual Visit.    The patient location is: [place], [place]  The chief complaint leading to consultation is: + Covid test x 2 today.  Visit type: audiovisual  Total time spent with patient: 15 minutes.  Each patient to whom he or she provides medical services by telemedicine is:  (1) informed of the relationship between the physician and patient and the respective role of any other health care provider with respect to management of the patient; and (2) notified that he or she may decline to receive medical services by telemedicine and may withdraw from such care at any time.    18 minutes of total time spent on the encounter, which includes face to face time and non-face to face time preparing to see the patient (eg, review of tests), Obtaining and/or reviewing separately obtained history, Documenting clinical information in the electronic or other health record, Independently interpreting results (not separately reported) and communicating results to the patient/family/caregiver, or Care coordination (not separately reported).     HPI    Pt, who is known to me, reports a new problem to me: fever to 102.3, chills, body aches, body aches, coughing, short of breath, heart racing.  Took ibuprofen and now is sweating.    Started yesterday with mild headache.  This morning she awoke and felt ok.  Within 2 hrs she felt awful.    These symptoms began 1 day  ago and status is worse after this morning.     Symptoms are + acute.    Pt denies the following symptoms:  Rash.    Aggravating factors include fever .    Relieving factors include Ibuprofen .    OTC Medications tried are Ibuprofen.    Prescription medications taken for symptoms are none.    Pertinent medical history:  Lupus.    Smoking status:  nonsmoker    ROS    Constitutional:   + 102+  fever, + fatigue.    Head:   + headache  Ears:   No pain.  Eyes:  No  sxs  Nose:   No sinus pain, + congestion, no runny nose, no post nasal drip.  Throat:  No ST pain    Heart:  No palpitations, chest pain.    Lungs:  No difficulty breathing, no coughing,    GI/:  No sxs    MS:  No new bone, joint or muscle problems.    Skin:  No rashes, itching.    Past Medical History:   Diagnosis Date    Anemia     ASD (atrial septal defect)     s/p device occlusion 12/2016    Bundle branch block, right     Fibromyalgia     pt denies    Ganglion cyst     Headache(784.0)     IBS (irritable bowel syndrome)     Kidney stone     PONV (postoperative nausea and vomiting)     Sinus tachycardia     SLE (systemic lupus erythematosus related syndrome)     Syncope and collapse        Current Outpatient Medications:     ALPRAZolam (XANAX) 0.25 MG tablet, alprazolam 0.25 mg tablet, Disp: , Rfl:     FLUoxetine 20 MG capsule, Take 1 capsule (20 mg total) by mouth once daily., Disp: 30 capsule, Rfl: 11    fluticasone propionate (FLONASE) 50 mcg/actuation nasal spray, 1 spray (50 mcg total) by Each Nostril route once daily., Disp: 16 g, Rfl: 2    hydrOXYchloroQUINE (PLAQUENIL) 200 mg tablet, Take 1 tablet (200 mg total) by mouth 2 (two) times daily., Disp: 60 tablet, Rfl: 7    ibuprofen-famotidine (DUEXIS) 800-26.6 mg Tab, Take 1 tablet by mouth 3 (three) times daily as needed., Disp: 90 tablet, Rfl: 12    levonorgestreL (MIRENA) 20 mcg/24 hours (6 yrs) 52 mg IUD, Mirena 20 mcg/24 hours (6 yrs) 52 mg intrauterine device  Take by intrauterine route., Disp: , Rfl:       PHYSICAL EXAM    There were no vitals filed for this visit.  Vital signs not taken per patient.  Patient's questionnaire (if available), medications & PMH all reviewed today.    Gen:  Alert, coop 32 y.o. female patient in no acute distress, is not ill-appearing.           Well developed, well-nourished  Psych:   Behavior and affect appropriate for the situation and setting.  HENT:   Normocephalic, atraumatic.  Symmetrical facial  movements.    Eyes without visible discharge or swelling.  No sneezing during the visit.  Voice steady, no hoarseness noted.  Resp:   Respiratory effort symmetrical.  No retractions  No increased work of breathing  No audible wheezes  Talks in full sentences.  No coughing during the interaction today.  CV:   No dino oral cyanosis  MSK:  Head and upper extremity movements smooth and even.  Neuro:  Responds promptly to questions showing good insight.  Skin:   No observed rashes/bruises    COVID-19  -     nirmatrelvir-ritonavir 150 mg x 2- 100 mg copackaged tablets (EUA); Take 3 tablets by mouth 2 (two) times daily for 5 days. Each dose contains 2 nirmatrelvir (pink tablets) and 1 ritonavir (white tablet). Take all 3 tablets together  Dispense: 30 tablet; Refill: 0    Systemic lupus erythematosus (SLE) affecting pregnancy, antepartum          Pt today presents with report of becoming mildly ill yesterday on the way home from [employer].  She felt fine this morning until she had been up for a couple of hours.  Then she felt like she'd been hit by a bus.  Tested x 2 with home Covid tests--both +.  No other family members ill.  Daughter had Covid in August. .  Additionally, she has Lupus.      This is a new problem to me.  No work up is planned.        I advised her   Pt advised to perform comfort measures recommended on patient instruction sheet, which were reviewed at the time of the visit..    If not better in 7 days, the patient is advised to call here, PCP office or go for an in-person/follow up evaluation.  If worse or concerns, the patient is advised to call for advise to this office or the PCP office or call [facility] or go to the nearest URGENT CARE or ER.  Explained exam findings, diagnosis and treatment plan to patient.  Questions answered and patient states understanding.

## 2022-04-25 NOTE — TELEPHONE ENCOUNTER
----- Message from Lucas Briceño sent at 4/25/2022  1:36 PM CDT -----  Regarding: positive covid test  Contact: Patient  Patient want to speak with a nurse regarding Doctor calling her something in, patient just tested positive for covid, if so please send to [facility] any questions please call back at [phone] (home)       [facility] - [place] - [address]  [place] [address]  Phone: [phone] Fax: [phone]

## 2022-04-25 NOTE — TELEPHONE ENCOUNTER
----- Message from Lucas Briceño sent at 4/25/2022  1:41 PM CDT -----  Regarding: medical advice  Contact: Patient  Patient want office to know she tested positive for covid, if patient need to do anything please call and let her know [phone] (home)     Case number [id number]

## 2022-05-02 ENCOUNTER — PATIENT MESSAGE (OUTPATIENT)
Dept: FAMILY MEDICINE | Facility: CLINIC | Age: 32
End: 2022-05-02
Payer: COMMERCIAL

## 2022-05-02 DIAGNOSIS — B96.89 BACTERIAL SINUSITIS: Primary | ICD-10-CM

## 2022-05-02 DIAGNOSIS — J32.9 BACTERIAL SINUSITIS: Primary | ICD-10-CM

## 2022-05-02 RX ORDER — AMOXICILLIN AND CLAVULANATE POTASSIUM 875; 125 MG/1; MG/1
1 TABLET, FILM COATED ORAL EVERY 12 HOURS
Qty: 14 TABLET | Refills: 0 | Status: SHIPPED | OUTPATIENT
Start: 2022-05-02 | End: 2022-05-09

## 2022-05-02 NOTE — TELEPHONE ENCOUNTER
Sent in Augmentin BID x 7 days.  Take with food and eat yogurt while on medication.  She will need to be seen again if symptoms persist or worsen.    Haleigh Camacho PA-C

## 2022-05-02 NOTE — TELEPHONE ENCOUNTER
Please advise, pt is 8 days post covid dx with sinus head pressure and aches. You have seen pt in past, would you be able to send something in due to kerri out until Wednesday?

## 2022-05-04 ENCOUNTER — PATIENT MESSAGE (OUTPATIENT)
Dept: RHEUMATOLOGY | Facility: CLINIC | Age: 32
End: 2022-05-04
Payer: COMMERCIAL

## 2022-05-09 ENCOUNTER — PATIENT MESSAGE (OUTPATIENT)
Dept: RHEUMATOLOGY | Facility: CLINIC | Age: 32
End: 2022-05-09
Payer: COMMERCIAL

## 2022-07-11 ENCOUNTER — DOCUMENTATION ONLY (OUTPATIENT)
Dept: PHARMACY | Facility: CLINIC | Age: 32
End: 2022-07-11
Payer: COMMERCIAL

## 2022-07-11 NOTE — PROGRESS NOTES
RECEIVED DOCUMENTATION REQUESTING ADDITIONAL INFORMATION TO SUBMIT PA FOR DUEXIS. FORM FILLED OUT AND SUBMITTED BACK TO [facility]    KRISTIAN DIAMOND CPHT  MED ACCESS

## 2022-09-12 ENCOUNTER — OFFICE VISIT (OUTPATIENT)
Dept: FAMILY MEDICINE | Facility: CLINIC | Age: 32
End: 2022-09-12
Payer: COMMERCIAL

## 2022-09-12 VITALS
HEIGHT: 63 IN | SYSTOLIC BLOOD PRESSURE: 120 MMHG | WEIGHT: 155.19 LBS | DIASTOLIC BLOOD PRESSURE: 76 MMHG | BODY MASS INDEX: 27.5 KG/M2 | OXYGEN SATURATION: 99 % | HEART RATE: 73 BPM | RESPIRATION RATE: 18 BRPM

## 2022-09-12 DIAGNOSIS — F41.9 ANXIETY: Primary | ICD-10-CM

## 2022-09-12 DIAGNOSIS — Z00.00 WELLNESS EXAMINATION: ICD-10-CM

## 2022-09-12 PROBLEM — R29.810 FACIAL WEAKNESS: Status: RESOLVED | Noted: 2021-08-19 | Resolved: 2022-09-12

## 2022-09-12 PROBLEM — M32.9 SYSTEMIC LUPUS ERYTHEMATOSUS (SLE) AFFECTING PREGNANCY, ANTEPARTUM: Status: RESOLVED | Noted: 2018-02-08 | Resolved: 2022-09-12

## 2022-09-12 PROBLEM — O99.891 SYSTEMIC LUPUS ERYTHEMATOSUS (SLE) AFFECTING PREGNANCY, ANTEPARTUM: Status: RESOLVED | Noted: 2018-02-08 | Resolved: 2022-09-12

## 2022-09-12 PROCEDURE — 99213 PR OFFICE/OUTPT VISIT, EST, LEVL III, 20-29 MIN: ICD-10-PCS | Mod: S$GLB,,, | Performed by: FAMILY MEDICINE

## 2022-09-12 PROCEDURE — 99213 OFFICE O/P EST LOW 20 MIN: CPT | Mod: S$GLB,,, | Performed by: FAMILY MEDICINE

## 2022-09-12 PROCEDURE — 99999 PR PBB SHADOW E&M-EST. PATIENT-LVL III: CPT | Mod: PBBFAC,,, | Performed by: FAMILY MEDICINE

## 2022-09-12 PROCEDURE — 99999 PR PBB SHADOW E&M-EST. PATIENT-LVL III: ICD-10-PCS | Mod: PBBFAC,,, | Performed by: FAMILY MEDICINE

## 2022-09-12 RX ORDER — FLUOXETINE HYDROCHLORIDE 20 MG/1
20 CAPSULE ORAL DAILY
Qty: 90 CAPSULE | Refills: 3 | Status: SHIPPED | OUTPATIENT
Start: 2022-09-12 | End: 2023-09-13 | Stop reason: SDUPTHER

## 2022-09-12 RX ORDER — FLUTICASONE PROPIONATE 50 MCG
1 SPRAY, SUSPENSION (ML) NASAL DAILY
Qty: 48 G | Refills: 3 | Status: SHIPPED | OUTPATIENT
Start: 2022-09-12 | End: 2023-03-13

## 2022-09-12 NOTE — PROGRESS NOTES
Subjective:       Patient ID: Jen Lazar is a 32 y.o. female.    Chief Complaint: Follow-up (6 month follow up, med refills)    Here for f/u mood and chronic issues. Doing well overall.       Review of Systems   Respiratory:  Negative for chest tightness and shortness of breath.    Cardiovascular:  Negative for palpitations and leg swelling.   Endocrine: Negative for cold intolerance and heat intolerance.   Psychiatric/Behavioral:  Negative for decreased concentration. The patient is not nervous/anxious.      Objective:      Physical Exam  Vitals and nursing note reviewed.   Constitutional:       Appearance: She is well-developed.   HENT:      Head: Normocephalic and atraumatic.   Cardiovascular:      Rate and Rhythm: Normal rate and regular rhythm.      Heart sounds: Normal heart sounds.   Pulmonary:      Effort: Pulmonary effort is normal.      Breath sounds: Normal breath sounds.   Psychiatric:         Mood and Affect: Mood normal.         Behavior: Behavior normal.       Assessment:       1. Anxiety    2. Wellness examination        Plan:       Anxiety    Wellness examination  -     CBC Without Differential; Future; Expected date: 09/12/2022  -     Comprehensive Metabolic Panel; Future; Expected date: 09/12/2022  -     Lipid Panel; Future; Expected date: 09/12/2022  -     TSH; Future; Expected date: 09/12/2022    Other orders  -     fluticasone propionate (FLONASE) 50 mcg/actuation nasal spray; 1 spray (50 mcg total) by Each Nostril route once daily.  Dispense: 48 g; Refill: 3  -     FLUoxetine 20 MG capsule; Take 1 capsule (20 mg total) by mouth once daily.  Dispense: 90 capsule; Refill: 3    Refill meds  Doing well   Will monitor chronic medical issues and continue current plan of care.    Follow up in about 6 months (around 3/12/2023), or if symptoms worsen or fail to improve.

## 2022-09-20 ENCOUNTER — OFFICE VISIT (OUTPATIENT)
Dept: FAMILY MEDICINE | Facility: CLINIC | Age: 32
End: 2022-09-20
Payer: COMMERCIAL

## 2022-09-20 ENCOUNTER — PATIENT MESSAGE (OUTPATIENT)
Dept: RHEUMATOLOGY | Facility: CLINIC | Age: 32
End: 2022-09-20
Payer: COMMERCIAL

## 2022-09-20 VITALS
HEIGHT: 63 IN | BODY MASS INDEX: 27.31 KG/M2 | SYSTOLIC BLOOD PRESSURE: 116 MMHG | TEMPERATURE: 98 F | DIASTOLIC BLOOD PRESSURE: 78 MMHG | OXYGEN SATURATION: 96 % | WEIGHT: 154.13 LBS | HEART RATE: 82 BPM

## 2022-09-20 DIAGNOSIS — J02.9 SORE THROAT: ICD-10-CM

## 2022-09-20 DIAGNOSIS — B96.89 ACUTE BACTERIAL SINUSITIS: ICD-10-CM

## 2022-09-20 DIAGNOSIS — J01.90 ACUTE BACTERIAL SINUSITIS: ICD-10-CM

## 2022-09-20 DIAGNOSIS — J34.89 SINUS PAIN: ICD-10-CM

## 2022-09-20 DIAGNOSIS — R05.9 COUGH: Primary | ICD-10-CM

## 2022-09-20 DIAGNOSIS — R09.89 CHEST CONGESTION: ICD-10-CM

## 2022-09-20 PROCEDURE — 99214 OFFICE O/P EST MOD 30 MIN: CPT | Mod: S$GLB,,, | Performed by: INTERNAL MEDICINE

## 2022-09-20 PROCEDURE — 99999 PR PBB SHADOW E&M-EST. PATIENT-LVL III: ICD-10-PCS | Mod: PBBFAC,,, | Performed by: INTERNAL MEDICINE

## 2022-09-20 PROCEDURE — 99214 PR OFFICE/OUTPT VISIT, EST, LEVL IV, 30-39 MIN: ICD-10-PCS | Mod: S$GLB,,, | Performed by: INTERNAL MEDICINE

## 2022-09-20 PROCEDURE — 99999 PR PBB SHADOW E&M-EST. PATIENT-LVL III: CPT | Mod: PBBFAC,,, | Performed by: INTERNAL MEDICINE

## 2022-09-20 RX ORDER — AMOXICILLIN AND CLAVULANATE POTASSIUM 875; 125 MG/1; MG/1
1 TABLET, FILM COATED ORAL EVERY 12 HOURS
Qty: 10 TABLET | Refills: 0 | Status: SHIPPED | OUTPATIENT
Start: 2022-09-20 | End: 2023-03-13

## 2022-09-20 NOTE — PROGRESS NOTES
Patient ID: Jen Lazar is a 32 y.o. female.    Chief Complaint: Headache, Nasal Congestion, Cough, and Sore Throat      Assessment HPI  Impression / Plan   Headache, nasal congestion, cough, and sore throat Started Friday night. No fevers. Headache intense in am. Covid negative today. Pain at roof of mouth and top teeth.  Acute sinusitis. Treat with augmentin.  Second-generation antihistamine (Zyrtec, Allegra, Xyzal, Claritin) once daily     Dx and Orders   Jen was seen today for headache, nasal congestion, cough and sore throat.    Diagnoses and all orders for this visit:    Cough  -     POCT COVID-19 Rapid Screening; Future    Chest congestion  -     POCT COVID-19 Rapid Screening; Future    Sinus pain  -     POCT COVID-19 Rapid Screening; Future    Sore throat  -     POCT COVID-19 Rapid Screening; Future    Acute bacterial sinusitis  -     amoxicillin-clavulanate 875-125mg (AUGMENTIN) 875-125 mg per tablet; Take 1 tablet by mouth every 12 (twelve) hours.        Review of Systems   Constitutional:  Negative for fever.   HENT:  Positive for congestion and sore throat.    Respiratory:  Positive for cough. Negative for shortness of breath.    Cardiovascular:  Negative for chest pain.   Gastrointestinal:  Negative for abdominal pain.   Neurological:  Positive for headaches.   Vitals:    09/20/22 1106   BP: 116/78   Pulse: 82   Temp: 98.2 °F (36.8 °C)     Wt Readings from Last 3 Encounters:   09/20/22 1106 69.9 kg (154 lb 1.6 oz)   09/12/22 0906 70.4 kg (155 lb 3.3 oz)   03/07/22 0909 63.5 kg (139 lb 15.9 oz)      Body mass index is 27.3 kg/m².   Physical Exam  Cardiovascular:      Rate and Rhythm: Normal rate and regular rhythm.      Heart sounds: No murmur heard.    No gallop.   Pulmonary:      Breath sounds: Normal breath sounds. No wheezing or rhonchi.   Abdominal:      General: Bowel sounds are normal.      Palpations: Abdomen is soft.      Tenderness: There is no abdominal tenderness.    Musculoskeletal:         General: Normal range of motion.      Cervical back: Neck supple.   Skin:     General: Skin is warm.      Findings: No rash.   Neurological:      Mental Status: She is alert.   Psychiatric:         Behavior: Behavior normal.              Medication List with Changes/Refills   New Medications    AMOXICILLIN-CLAVULANATE 875-125MG (AUGMENTIN) 875-125 MG PER TABLET    Take 1 tablet by mouth every 12 (twelve) hours.   Current Medications    ALPRAZOLAM (XANAX) 0.25 MG TABLET    alprazolam 0.25 mg tablet    FLUOXETINE 20 MG CAPSULE    Take 1 capsule (20 mg total) by mouth once daily.    FLUTICASONE PROPIONATE (FLONASE) 50 MCG/ACTUATION NASAL SPRAY    1 spray (50 mcg total) by Each Nostril route once daily.    HYDROXYCHLOROQUINE (PLAQUENIL) 200 MG TABLET    Take 1 tablet (200 mg total) by mouth 2 (two) times daily.    IBUPROFEN-FAMOTIDINE (DUEXIS) 800-26.6 MG TAB    Take 1 tablet by mouth 3 (three) times daily as needed.    LEVONORGESTREL (MIRENA) 20 MCG/24 HOURS (6 YRS) 52 MG IUD    Mirena 20 mcg/24 hours (6 yrs) 52 mg intrauterine device   Take by intrauterine route.       I personally reviewed past medical, family and social history.

## 2022-10-25 ENCOUNTER — PATIENT MESSAGE (OUTPATIENT)
Dept: RHEUMATOLOGY | Facility: CLINIC | Age: 32
End: 2022-10-25
Payer: COMMERCIAL

## 2022-10-26 ENCOUNTER — PATIENT MESSAGE (OUTPATIENT)
Dept: RHEUMATOLOGY | Facility: CLINIC | Age: 32
End: 2022-10-26
Payer: COMMERCIAL

## 2022-12-05 ENCOUNTER — OFFICE VISIT (OUTPATIENT)
Dept: FAMILY MEDICINE | Facility: CLINIC | Age: 32
End: 2022-12-05
Payer: COMMERCIAL

## 2022-12-05 VITALS
WEIGHT: 160.69 LBS | OXYGEN SATURATION: 97 % | SYSTOLIC BLOOD PRESSURE: 104 MMHG | HEIGHT: 63 IN | DIASTOLIC BLOOD PRESSURE: 60 MMHG | BODY MASS INDEX: 28.47 KG/M2 | TEMPERATURE: 98 F | RESPIRATION RATE: 18 BRPM | HEART RATE: 91 BPM

## 2022-12-05 DIAGNOSIS — R49.9 VOICE DISORDER: Primary | ICD-10-CM

## 2022-12-05 DIAGNOSIS — M32.9 SYSTEMIC LUPUS ERYTHEMATOSUS, UNSPECIFIED SLE TYPE, UNSPECIFIED ORGAN INVOLVEMENT STATUS: ICD-10-CM

## 2022-12-05 PROCEDURE — 99999 PR PBB SHADOW E&M-EST. PATIENT-LVL III: CPT | Mod: PBBFAC,,, | Performed by: FAMILY MEDICINE

## 2022-12-05 PROCEDURE — 99214 PR OFFICE/OUTPT VISIT, EST, LEVL IV, 30-39 MIN: ICD-10-PCS | Mod: S$GLB,,, | Performed by: FAMILY MEDICINE

## 2022-12-05 PROCEDURE — 99999 PR PBB SHADOW E&M-EST. PATIENT-LVL III: ICD-10-PCS | Mod: PBBFAC,,, | Performed by: FAMILY MEDICINE

## 2022-12-05 PROCEDURE — 99214 OFFICE O/P EST MOD 30 MIN: CPT | Mod: S$GLB,,, | Performed by: FAMILY MEDICINE

## 2022-12-05 NOTE — PROGRESS NOTES
Subjective:       Patient ID: Jen Lazar is a 32 y.o. female.    Chief Complaint: Mass (Patient states she has lupus. She states she always has neck problems but is having trouble swallowing frm issues with neck)    HPI    Hx of lupus.     Reports throat muscles feeling weak more so with walking x 1 week.  Gets worse at end of day. Reports less weakness in voice muscles yesterday evening. No fever, sore throat, nasal congestion and cough. No hoarseness. Hx of weak throat muscles related to lupus that lasts for 1-2 weeks.     Review of Systems      Review of Systems   Constitutional: Negative for fever and chills.   HENT: Negative for hearing loss and neck stiffness.    Eyes: Negative for redness and itching.   Respiratory: Negative for cough and choking.    Cardiovascular: Negative for chest pain and leg swelling.  Abdomen: Negative for abdominal pain and blood in stool.   Genitourinary: Negative for dysuria and flank pain.   Musculoskeletal: Negative for back pain and gait problem.   Neurological: Negative for light-headedness and headaches.   Hematological: Negative for adenopathy.   Psychiatric/Behavioral: Negative for behavioral problems.     Objective:      Physical Exam  Constitutional:       Appearance: She is well-developed.   HENT:      Head: Normocephalic and atraumatic.   Eyes:      Conjunctiva/sclera: Conjunctivae normal.      Pupils: Pupils are equal, round, and reactive to light.   Cardiovascular:      Rate and Rhythm: Normal rate and regular rhythm.      Heart sounds: No murmur heard.  Pulmonary:      Effort: Pulmonary effort is normal.      Breath sounds: Normal breath sounds.   Musculoskeletal:      Cervical back: Normal range of motion.   Lymphadenopathy:      Cervical: No cervical adenopathy.       Assessment:       1. Voice disorder    2. Systemic lupus erythematosus, unspecified SLE type, unspecified organ involvement status        Plan:       Voice disorder    Systemic lupus  erythematosus, unspecified SLE type, unspecified organ involvement status            Plan:  Resolving. Patient may take prednisone at home to see if this helps  Cont current meds    Medication List with Changes/Refills   Current Medications    ALPRAZOLAM (XANAX) 0.25 MG TABLET    alprazolam 0.25 mg tablet    AMOXICILLIN-CLAVULANATE 875-125MG (AUGMENTIN) 875-125 MG PER TABLET    Take 1 tablet by mouth every 12 (twelve) hours.    FLUOXETINE 20 MG CAPSULE    Take 1 capsule (20 mg total) by mouth once daily.    FLUTICASONE PROPIONATE (FLONASE) 50 MCG/ACTUATION NASAL SPRAY    1 spray (50 mcg total) by Each Nostril route once daily.    HYDROXYCHLOROQUINE (PLAQUENIL) 200 MG TABLET    Take 1 tablet (200 mg total) by mouth 2 (two) times daily.    IBUPROFEN-FAMOTIDINE (DUEXIS) 800-26.6 MG TAB    Take 1 tablet by mouth 3 (three) times daily as needed.    LEVONORGESTREL (MIRENA) 20 MCG/24 HOURS (6 YRS) 52 MG IUD    Mirena 20 mcg/24 hours (6 yrs) 52 mg intrauterine device   Take by intrauterine route.

## 2023-02-22 NOTE — PROGRESS NOTES
Subjective:     Referring Physician: Dr Kranthi DURÁN  Jen Lazar is a 33 y.o. female who is here for follow up after PFO closure.      Last seen 1/23/2019: At that time Echo was done with no intracardiac shunts and she had no symptoms at that time.      1/27/2021: Today she is doing well without any complaints. She had an echo today with no shunt noted and also with normal RV function and normal PA pressure.     2/23/23: She continues to do well today without complaints. She does have very infrequent palpitations lasting a few seconds. These symptoms occur less than once a month. She denies dizziness, CP, syncope or near syncope.     NYHA: I CCS: 0    Review of Systems   Constitutional: Negative for chills and fever.   HENT:  Negative for sore throat.    Eyes:  Negative for blurred vision.   Cardiovascular:  Negative for chest pain, claudication, cyanosis, dyspnea on exertion, irregular heartbeat, leg swelling, near-syncope, orthopnea, palpitations, paroxysmal nocturnal dyspnea and syncope.   Respiratory:  Negative for cough and sputum production.    Hematologic/Lymphatic: Does not bruise/bleed easily.   Skin:  Negative for itching, rash and suspicious lesions.   Musculoskeletal:  Negative for falls.   Gastrointestinal:  Negative for abdominal pain and change in bowel habit.   Genitourinary:  Negative for dysuria.   Neurological:  Negative for disturbances in coordination, dizziness and loss of balance.   Psychiatric/Behavioral:  Negative for altered mental status.         Past Medical History:   Diagnosis Date    Anemia     ASD (atrial septal defect)     s/p device occlusion 12/2016    Bundle branch block, right     Fibromyalgia     pt denies    Ganglion cyst     Headache(784.0)     IBS (irritable bowel syndrome)     Kidney stone     PONV (postoperative nausea and vomiting)     Sinus tachycardia     SLE (systemic lupus erythematosus related syndrome)     Syncope and collapse        Current Outpatient  Medications:     ALPRAZolam (XANAX) 0.25 MG tablet, alprazolam 0.25 mg tablet, Disp: , Rfl:     FLUoxetine 20 MG capsule, Take 1 capsule (20 mg total) by mouth once daily., Disp: 90 capsule, Rfl: 3    hydrOXYchloroQUINE (PLAQUENIL) 200 mg tablet, Take 1 tablet (200 mg total) by mouth 2 (two) times daily., Disp: 60 tablet, Rfl: 7    levonorgestreL (MIRENA) 20 mcg/24 hours (6 yrs) 52 mg IUD, Mirena 20 mcg/24 hours (6 yrs) 52 mg intrauterine device  Take by intrauterine route., Disp: , Rfl:     amoxicillin-clavulanate 875-125mg (AUGMENTIN) 875-125 mg per tablet, Take 1 tablet by mouth every 12 (twelve) hours. (Patient not taking: Reported on 12/5/2022), Disp: 10 tablet, Rfl: 0    fluticasone propionate (FLONASE) 50 mcg/actuation nasal spray, 1 spray (50 mcg total) by Each Nostril route once daily. (Patient not taking: Reported on 12/5/2022), Disp: 48 g, Rfl: 3    ibuprofen-famotidine (DUEXIS) 800-26.6 mg Tab, Take 1 tablet by mouth 3 (three) times daily as needed. (Patient not taking: Reported on 2/23/2023), Disp: 90 tablet, Rfl: 12    Objective:    Physical Exam  Vitals reviewed.   Constitutional:       General: She is not in acute distress.     Appearance: She is well-developed. She is not diaphoretic.   HENT:      Head: Normocephalic and atraumatic.   Neck:      Vascular: No JVD.   Cardiovascular:      Rate and Rhythm: Normal rate and regular rhythm.      Pulses: Intact distal pulses.      Heart sounds: No murmur heard.  Pulmonary:      Effort: Pulmonary effort is normal. No respiratory distress.   Musculoskeletal:      Cervical back: Normal range of motion.      Right lower leg: No edema.      Left lower leg: No edema.   Skin:     General: Skin is warm and dry.   Neurological:      Mental Status: She is alert and oriented to person, place, and time.           Vitals:    02/23/23 1027 02/23/23 1029   BP: 108/64 108/69   BP Location: Left arm Right arm   Patient Position: Sitting Sitting   BP Method: Large  "(Automatic) Large (Automatic)   Pulse: 72 70   SpO2: 99%    Weight: 76.8 kg (169 lb 5 oz)    Height: 5' 2" (1.575 m)      Body mass index is 30.97 kg/m².    Test(s) Reviewed  I have reviewed the following in detail:  [] Stress test   [] Angiography   [x] Echocardiogram   [x] Labs   [] Other       Chemistry        Component Value Date/Time     11/29/2021 1125    K 3.8 11/29/2021 1125     11/29/2021 1125    CO2 25 11/29/2021 1125    BUN 17 11/29/2021 1125    CREATININE 0.8 11/29/2021 1125    CREATININE 0.7 05/18/2013 0010    GLU 53 (L) 11/29/2021 1125        Component Value Date/Time    CALCIUM 9.6 11/29/2021 1125    CALCIUM 9.5 05/18/2013 0010    ALKPHOS 66 11/29/2021 1125    ALKPHOS 60 05/18/2013 0010    AST 14 11/29/2021 1125    AST 18 05/11/2016 1117    ALT 9 (L) 11/29/2021 1125    BILITOT 0.6 11/29/2021 1125    ESTGFRAFRICA >60.0 11/29/2021 1125    EGFRNONAA >60.0 11/29/2021 1125            TTE 02/23/2023  S/p PFO occlusion Cardioform 25mm device  The left ventricle is normal in size with normal systolic function.  The estimated ejection fraction is 65%.  Normal left ventricular diastolic function.  Normal right ventricular size with normal right ventricular systolic function.  Normal central venous pressure (3 mmHg).  No evidence of intracardiac shunting by bubble contrast.    Assessment:   PFO (patent foramen ovale)- s/p closure  Doing well without any complaints. Echo reviewed personally and without any shunts. RV function is normal and PA pressure is normal. Will plan for an echo every 2 years to monitor for septal erosion. She also has a history of a pulmonary AVM which was no longer present on her last CTA.    Pulmonary hypertension  History of pulmonary HTN with PASP ranging from 20-30's up until 2019. 2021 TTE showed normal PA pressure. TTE today was unable to calculate PA pressure due to poor visualization of the tricuspid valve. Since she remains asymptomatic with normal RV function, we will " plan for TTE in 1 year.     Palpitations  Palpitations are infrequent and last a few second. She does not experience them more than one a month. OK with monitoring for now. If they become more frequent or symptomatic, we will plan for a 30 day monitor.     Plan:     Follow up in 1 year with TTE bubble to monitor for erosion and PA pressure.   Follow up sooner if symptoms present.   SBE prophylaxis for life.            Velia Vang PA-C  Valve and Structural Heart Disease  [facility]

## 2023-02-23 ENCOUNTER — HOSPITAL ENCOUNTER (OUTPATIENT)
Dept: CARDIOLOGY | Facility: HOSPITAL | Age: 33
Discharge: HOME | End: 2023-02-23
Attending: INTERNAL MEDICINE
Payer: COMMERCIAL

## 2023-02-23 ENCOUNTER — OFFICE VISIT (OUTPATIENT)
Dept: CARDIOLOGY | Facility: CLINIC | Age: 33
End: 2023-02-23
Payer: COMMERCIAL

## 2023-02-23 VITALS
OXYGEN SATURATION: 99 % | SYSTOLIC BLOOD PRESSURE: 108 MMHG | DIASTOLIC BLOOD PRESSURE: 69 MMHG | HEIGHT: 62 IN | WEIGHT: 169.31 LBS | BODY MASS INDEX: 31.16 KG/M2 | HEART RATE: 70 BPM

## 2023-02-23 VITALS
DIASTOLIC BLOOD PRESSURE: 80 MMHG | WEIGHT: 160 LBS | HEART RATE: 70 BPM | SYSTOLIC BLOOD PRESSURE: 120 MMHG | BODY MASS INDEX: 28.35 KG/M2 | HEIGHT: 63 IN

## 2023-02-23 DIAGNOSIS — Q21.12 PFO (PATENT FORAMEN OVALE): ICD-10-CM

## 2023-02-23 DIAGNOSIS — R00.2 PALPITATIONS: ICD-10-CM

## 2023-02-23 DIAGNOSIS — I27.20 PULMONARY HYPERTENSION: ICD-10-CM

## 2023-02-23 DIAGNOSIS — Q21.12 PFO (PATENT FORAMEN OVALE): Primary | ICD-10-CM

## 2023-02-23 LAB
ASCENDING AORTA: 2.85 CM
AV INDEX (PROSTH): 0.72
AV MEAN GRADIENT: 4 MMHG
AV PEAK GRADIENT: 7 MMHG
AV VALVE AREA: 2.31 CM2
AV VELOCITY RATIO: 0.57
BSA FOR ECHO PROCEDURE: 1.8 M2
CV ECHO LV RWT: 0.39 CM
DOP CALC AO PEAK VEL: 1.33 M/S
DOP CALC AO VTI: 24.37 CM
DOP CALC LVOT AREA: 3.2 CM2
DOP CALC LVOT DIAMETER: 2.02 CM
DOP CALC LVOT PEAK VEL: 0.76 M/S
DOP CALC LVOT STROKE VOLUME: 56.41 CM3
DOP CALCLVOT PEAK VEL VTI: 17.61 CM
E WAVE DECELERATION TIME: 202.52 MSEC
E/A RATIO: 1.74
E/E' RATIO: 6.56 M/S
ECHO LV POSTERIOR WALL: 0.86 CM (ref 0.6–1.1)
EJECTION FRACTION: 65 %
FRACTIONAL SHORTENING: 29 % (ref 28–44)
INTERVENTRICULAR SEPTUM: 0.75 CM (ref 0.6–1.1)
LA MAJOR: 4.29 CM
LA MINOR: 3.91 CM
LA WIDTH: 3.17 CM
LEFT ATRIUM SIZE: 2.69 CM
LEFT ATRIUM VOLUME INDEX MOD: 17.4 ML/M2
LEFT ATRIUM VOLUME INDEX: 16.8 ML/M2
LEFT ATRIUM VOLUME MOD: 30.57 CM3
LEFT ATRIUM VOLUME: 29.65 CM3
LEFT INTERNAL DIMENSION IN SYSTOLE: 3.13 CM (ref 2.1–4)
LEFT VENTRICLE DIASTOLIC VOLUME INDEX: 49.71 ML/M2
LEFT VENTRICLE DIASTOLIC VOLUME: 87.49 ML
LEFT VENTRICLE MASS INDEX: 63 G/M2
LEFT VENTRICLE SYSTOLIC VOLUME INDEX: 22.1 ML/M2
LEFT VENTRICLE SYSTOLIC VOLUME: 38.81 ML
LEFT VENTRICULAR INTERNAL DIMENSION IN DIASTOLE: 4.4 CM (ref 3.5–6)
LEFT VENTRICULAR MASS: 110.34 G
LV LATERAL E/E' RATIO: 5.86 M/S
LV SEPTAL E/E' RATIO: 7.45 M/S
MV A" WAVE DURATION": 11.99 MSEC
MV PEAK A VEL: 0.47 M/S
MV PEAK E VEL: 0.82 M/S
MV STENOSIS PRESSURE HALF TIME: 58.73 MS
MV VALVE AREA P 1/2 METHOD: 3.75 CM2
PULM VEIN S/D RATIO: 1.39
PV PEAK D VEL: 0.28 M/S
PV PEAK S VEL: 0.39 M/S
RA MAJOR: 4.06 CM
RA PRESSURE: 3 MMHG
RA WIDTH: 2.94 CM
RIGHT VENTRICULAR END-DIASTOLIC DIMENSION: 3.15 CM
RV TISSUE DOPPLER FREE WALL SYSTOLIC VELOCITY 1 (APICAL 4 CHAMBER VIEW): 11.03 CM/S
SINUS: 2.7 CM
STJ: 2.46 CM
TDI LATERAL: 0.14 M/S
TDI SEPTAL: 0.11 M/S
TDI: 0.13 M/S
TRICUSPID ANNULAR PLANE SYSTOLIC EXCURSION: 2.45 CM

## 2023-02-23 PROCEDURE — 93306 TTE W/DOPPLER COMPLETE: CPT

## 2023-02-23 PROCEDURE — 93306 TTE W/DOPPLER COMPLETE: CPT | Mod: 26,,, | Performed by: INTERNAL MEDICINE

## 2023-02-23 PROCEDURE — 99999 PR PBB SHADOW E&M-EST. PATIENT-LVL III: CPT | Mod: PBBFAC,,, | Performed by: PHYSICIAN ASSISTANT

## 2023-02-23 PROCEDURE — 99999 PR PBB SHADOW E&M-EST. PATIENT-LVL III: ICD-10-PCS | Mod: PBBFAC,,, | Performed by: PHYSICIAN ASSISTANT

## 2023-02-23 PROCEDURE — 99204 OFFICE O/P NEW MOD 45 MIN: CPT | Mod: S$GLB,,, | Performed by: PHYSICIAN ASSISTANT

## 2023-02-23 PROCEDURE — 93306 ECHO (CUPID ONLY): ICD-10-PCS | Mod: 26,,, | Performed by: INTERNAL MEDICINE

## 2023-02-23 PROCEDURE — 99204 PR OFFICE/OUTPT VISIT, NEW, LEVL IV, 45-59 MIN: ICD-10-PCS | Mod: S$GLB,,, | Performed by: PHYSICIAN ASSISTANT

## 2023-02-23 NOTE — NURSING
Patient identified by 2 identifiers.   Allergies reviewed.  22 g IV placed to Lt AC .  Bubble study explained to patient, patient verbalized understanding.  Bubble performed X 2, with & without Valsalva.  Pt tolerated procedure well.  IV d/c with catheter intact, pressure dsg applied.

## 2023-03-09 ENCOUNTER — LAB VISIT (OUTPATIENT)
Dept: LAB | Facility: HOSPITAL | Age: 33
End: 2023-03-09
Attending: FAMILY MEDICINE
Payer: COMMERCIAL

## 2023-03-09 DIAGNOSIS — Z00.00 WELLNESS EXAMINATION: ICD-10-CM

## 2023-03-09 LAB
ALBUMIN SERPL BCP-MCNC: 3.8 G/DL (ref 3.5–5.2)
ALP SERPL-CCNC: 83 U/L (ref 55–135)
ALT SERPL W/O P-5'-P-CCNC: 12 U/L (ref 10–44)
ANION GAP SERPL CALC-SCNC: 8 MMOL/L (ref 8–16)
AST SERPL-CCNC: 15 U/L (ref 10–40)
BILIRUB SERPL-MCNC: 0.4 MG/DL (ref 0.1–1)
BUN SERPL-MCNC: 15 MG/DL (ref 6–20)
CALCIUM SERPL-MCNC: 9.3 MG/DL (ref 8.7–10.5)
CHLORIDE SERPL-SCNC: 107 MMOL/L (ref 95–110)
CHOLEST SERPL-MCNC: 158 MG/DL (ref 120–199)
CHOLEST/HDLC SERPL: 2.6 {RATIO} (ref 2–5)
CO2 SERPL-SCNC: 26 MMOL/L (ref 23–29)
CREAT SERPL-MCNC: 0.8 MG/DL (ref 0.5–1.4)
ERYTHROCYTE [DISTWIDTH] IN BLOOD BY AUTOMATED COUNT: 11.9 % (ref 11.5–14.5)
EST. GFR  (NO RACE VARIABLE): >60 ML/MIN/1.73 M^2
GLUCOSE SERPL-MCNC: 87 MG/DL (ref 70–110)
HCT VFR BLD AUTO: 40.3 % (ref 37–48.5)
HDLC SERPL-MCNC: 60 MG/DL (ref 40–75)
HDLC SERPL: 38 % (ref 20–50)
HGB BLD-MCNC: 12.5 G/DL (ref 12–16)
LDLC SERPL CALC-MCNC: 85 MG/DL (ref 63–159)
MCH RBC QN AUTO: 29 PG (ref 27–31)
MCHC RBC AUTO-ENTMCNC: 31 G/DL (ref 32–36)
MCV RBC AUTO: 94 FL (ref 82–98)
NONHDLC SERPL-MCNC: 98 MG/DL
PLATELET # BLD AUTO: 239 K/UL (ref 150–450)
PMV BLD AUTO: 10.9 FL (ref 9.2–12.9)
POTASSIUM SERPL-SCNC: 4 MMOL/L (ref 3.5–5.1)
PROT SERPL-MCNC: 7.1 G/DL (ref 6–8.4)
RBC # BLD AUTO: 4.31 M/UL (ref 4–5.4)
SODIUM SERPL-SCNC: 141 MMOL/L (ref 136–145)
TRIGL SERPL-MCNC: 65 MG/DL (ref 30–150)
TSH SERPL DL<=0.005 MIU/L-ACNC: 0.85 UIU/ML (ref 0.4–4)
WBC # BLD AUTO: 7.24 K/UL (ref 3.9–12.7)

## 2023-03-09 PROCEDURE — 85027 COMPLETE CBC AUTOMATED: CPT | Performed by: FAMILY MEDICINE

## 2023-03-09 PROCEDURE — 36415 COLL VENOUS BLD VENIPUNCTURE: CPT | Mod: PO | Performed by: FAMILY MEDICINE

## 2023-03-09 PROCEDURE — 80061 LIPID PANEL: CPT | Performed by: FAMILY MEDICINE

## 2023-03-09 PROCEDURE — 84443 ASSAY THYROID STIM HORMONE: CPT | Performed by: FAMILY MEDICINE

## 2023-03-09 PROCEDURE — 80053 COMPREHEN METABOLIC PANEL: CPT | Performed by: FAMILY MEDICINE

## 2023-03-13 ENCOUNTER — OFFICE VISIT (OUTPATIENT)
Dept: FAMILY MEDICINE | Facility: CLINIC | Age: 33
End: 2023-03-13
Payer: COMMERCIAL

## 2023-03-13 VITALS
BODY MASS INDEX: 29.41 KG/M2 | WEIGHT: 166 LBS | HEART RATE: 86 BPM | HEIGHT: 63 IN | SYSTOLIC BLOOD PRESSURE: 110 MMHG | DIASTOLIC BLOOD PRESSURE: 62 MMHG | OXYGEN SATURATION: 97 %

## 2023-03-13 DIAGNOSIS — Z00.00 WELLNESS EXAMINATION: Primary | ICD-10-CM

## 2023-03-13 DIAGNOSIS — J06.9 UPPER RESPIRATORY TRACT INFECTION, UNSPECIFIED TYPE: ICD-10-CM

## 2023-03-13 DIAGNOSIS — F41.9 ANXIETY: ICD-10-CM

## 2023-03-13 PROCEDURE — 99999 PR PBB SHADOW E&M-EST. PATIENT-LVL III: ICD-10-PCS | Mod: PBBFAC,,, | Performed by: FAMILY MEDICINE

## 2023-03-13 PROCEDURE — 99999 PR PBB SHADOW E&M-EST. PATIENT-LVL III: CPT | Mod: PBBFAC,,, | Performed by: FAMILY MEDICINE

## 2023-03-13 PROCEDURE — 99395 PREV VISIT EST AGE 18-39: CPT | Mod: S$GLB,,, | Performed by: FAMILY MEDICINE

## 2023-03-13 PROCEDURE — 99395 PR PREVENTIVE VISIT,EST,18-39: ICD-10-PCS | Mod: S$GLB,,, | Performed by: FAMILY MEDICINE

## 2023-03-13 RX ORDER — MUPIROCIN 20 MG/G
OINTMENT TOPICAL 2 TIMES DAILY
Qty: 22 G | Refills: 0 | Status: SHIPPED | OUTPATIENT
Start: 2023-03-13 | End: 2023-10-13

## 2023-03-13 RX ORDER — ALPRAZOLAM 0.25 MG/1
0.25 TABLET ORAL DAILY PRN
Qty: 30 TABLET | Refills: 0 | Status: SHIPPED | OUTPATIENT
Start: 2023-03-13 | End: 2023-10-13 | Stop reason: SDUPTHER

## 2023-03-13 NOTE — PROGRESS NOTES
Subjective:       Patient ID: Jen Lazar is a 33 y.o. female.    Chief Complaint: Medication Refill, Sore Throat, and Sinus Problem (With pressure sensation in face)    Here for wellness and follow up multiple chronic medical issues. Doing well overall and in normal state of health.  New issue of sinus issue for last 5 days.  Sick household contacts with covid last week.  Also bit by her dog Saturday.      Medication Refill  Associated symptoms include a sore throat. Pertinent negatives include no chest pain, chills, coughing or fever.   Sore Throat   Pertinent negatives include no coughing or shortness of breath.   Sinus Problem  Associated symptoms include a sore throat. Pertinent negatives include no chills, coughing or shortness of breath.   Review of Systems   Constitutional:  Negative for chills and fever.   HENT:  Positive for sinus pain and sore throat.    Respiratory:  Negative for cough, chest tightness and shortness of breath.    Cardiovascular:  Negative for chest pain, palpitations and leg swelling.   Endocrine: Negative for cold intolerance and heat intolerance.   Psychiatric/Behavioral:  Negative for decreased concentration. The patient is not nervous/anxious.      Objective:      Physical Exam  Vitals and nursing note reviewed.   Constitutional:       Appearance: She is well-developed.   HENT:      Head: Normocephalic and atraumatic.   Cardiovascular:      Rate and Rhythm: Normal rate and regular rhythm.      Heart sounds: Normal heart sounds.   Pulmonary:      Effort: Pulmonary effort is normal.      Breath sounds: Normal breath sounds.   Psychiatric:         Mood and Affect: Mood normal.         Behavior: Behavior normal.         Assessment:       1. Wellness examination    2. Upper respiratory tract infection, unspecified type    3. Anxiety        Plan:       Wellness examination    Upper respiratory tract infection, unspecified type    Anxiety    Other orders  -     mupirocin (BACTROBAN)  2 % ointment; Apply topically 2 (two) times daily.  Dispense: 22 g; Refill: 0  -     ALPRAZolam (XANAX) 0.25 MG tablet; Take 1 tablet (0.25 mg total) by mouth daily as needed for Anxiety.  Dispense: 30 tablet; Refill: 0      Hold abx and monitor  Otc med  Will monitor chronic medical issues and continue current plan of care.  No puncture wound  Call if worsens; dog utd shots  Refill prn benzo started by rheum and only use sparingly after discussion    Follow up in about 6 months (around 9/13/2023), or if symptoms worsen or fail to improve.

## 2023-09-13 RX ORDER — FLUOXETINE HYDROCHLORIDE 20 MG/1
20 CAPSULE ORAL DAILY
Qty: 30 CAPSULE | Refills: 1 | Status: SHIPPED | OUTPATIENT
Start: 2023-09-13 | End: 2023-10-13 | Stop reason: SDUPTHER

## 2023-09-13 NOTE — TELEPHONE ENCOUNTER
No care due was identified.  [facility] Embedded Care Due Messages. Reference number: [id number].   9/13/2023 11:38:47 AM CDT

## 2023-10-13 ENCOUNTER — OFFICE VISIT (OUTPATIENT)
Dept: FAMILY MEDICINE | Facility: CLINIC | Age: 33
End: 2023-10-13
Payer: COMMERCIAL

## 2023-10-13 VITALS
DIASTOLIC BLOOD PRESSURE: 60 MMHG | HEART RATE: 95 BPM | WEIGHT: 169.56 LBS | HEIGHT: 63 IN | SYSTOLIC BLOOD PRESSURE: 110 MMHG | BODY MASS INDEX: 30.04 KG/M2 | OXYGEN SATURATION: 97 %

## 2023-10-13 DIAGNOSIS — Z00.00 WELLNESS EXAMINATION: ICD-10-CM

## 2023-10-13 DIAGNOSIS — J06.9 UPPER RESPIRATORY TRACT INFECTION, UNSPECIFIED TYPE: ICD-10-CM

## 2023-10-13 DIAGNOSIS — F41.9 ANXIETY: Primary | ICD-10-CM

## 2023-10-13 PROCEDURE — 99999 PR PBB SHADOW E&M-EST. PATIENT-LVL III: CPT | Mod: PBBFAC,,, | Performed by: FAMILY MEDICINE

## 2023-10-13 PROCEDURE — 99214 PR OFFICE/OUTPT VISIT, EST, LEVL IV, 30-39 MIN: ICD-10-PCS | Mod: S$GLB,,, | Performed by: FAMILY MEDICINE

## 2023-10-13 PROCEDURE — 99214 OFFICE O/P EST MOD 30 MIN: CPT | Mod: S$GLB,,, | Performed by: FAMILY MEDICINE

## 2023-10-13 PROCEDURE — 99999 PR PBB SHADOW E&M-EST. PATIENT-LVL III: ICD-10-PCS | Mod: PBBFAC,,, | Performed by: FAMILY MEDICINE

## 2023-10-13 RX ORDER — ALPRAZOLAM 0.25 MG/1
0.25 TABLET ORAL DAILY PRN
Qty: 30 TABLET | Refills: 0 | Status: SHIPPED | OUTPATIENT
Start: 2023-10-13

## 2023-10-13 RX ORDER — AMOXICILLIN 875 MG/1
875 TABLET, FILM COATED ORAL EVERY 12 HOURS
Qty: 14 TABLET | Refills: 0 | Status: SHIPPED | OUTPATIENT
Start: 2023-10-13 | End: 2024-03-25

## 2023-10-13 RX ORDER — PREDNISONE 10 MG/1
TABLET ORAL
COMMUNITY
Start: 2023-05-31

## 2023-10-13 RX ORDER — FLUOXETINE HYDROCHLORIDE 20 MG/1
20 CAPSULE ORAL DAILY
Qty: 30 CAPSULE | Refills: 5 | Status: SHIPPED | OUTPATIENT
Start: 2023-10-13 | End: 2024-03-25 | Stop reason: SDUPTHER

## 2023-10-13 NOTE — PROGRESS NOTES
Subjective:       Patient ID: Jen Lazar is a 33 y.o. female.    Chief Complaint: Medication Refill and Sinus Problem    Here for follow up multiple chronic medical issues. Doing well overall and in normal state of health.  Fever 103 3 days ago and 101 yesterday. Feeling better today.  Some cough and lots congestion of sinus lately.      Medication Refill  Associated symptoms include congestion and a fever. Pertinent negatives include no chest pain, chills or coughing.   Sinus Problem  Associated symptoms include congestion. Pertinent negatives include no chills, coughing or shortness of breath.     Review of Systems   Constitutional:  Positive for fever. Negative for chills.   HENT:  Positive for congestion.    Respiratory:  Negative for cough, chest tightness and shortness of breath.    Cardiovascular:  Negative for chest pain, palpitations and leg swelling.   Endocrine: Negative for cold intolerance and heat intolerance.   Psychiatric/Behavioral:  Negative for decreased concentration. The patient is not nervous/anxious.        Objective:      Physical Exam  Vitals and nursing note reviewed.   Constitutional:       Appearance: She is well-developed.   HENT:      Head: Normocephalic and atraumatic.   Cardiovascular:      Rate and Rhythm: Normal rate and regular rhythm.      Heart sounds: Normal heart sounds.   Pulmonary:      Effort: Pulmonary effort is normal.      Breath sounds: Normal breath sounds.   Psychiatric:         Mood and Affect: Mood normal.         Behavior: Behavior normal.         Assessment:       1. Anxiety    2. Wellness examination    3. Upper respiratory tract infection, unspecified type        Plan:       Anxiety    Wellness examination  -     CBC Without Differential; Future; Expected date: 10/13/2023  -     Comprehensive Metabolic Panel; Future; Expected date: 10/13/2023  -     Hemoglobin A1C; Future; Expected date: 10/13/2023  -     Lipid Panel; Future; Expected date: 10/13/2023  -      TSH; Future; Expected date: 10/13/2023    Upper respiratory tract infection, unspecified type    Other orders  -     amoxicillin (AMOXIL) 875 MG tablet; Take 1 tablet (875 mg total) by mouth every 12 (twelve) hours.  Dispense: 14 tablet; Refill: 0  -     FLUoxetine 20 MG capsule; Take 1 capsule (20 mg total) by mouth once daily.  Dispense: 30 capsule; Refill: 5  -     ALPRAZolam (XANAX) 0.25 MG tablet; Take 1 tablet (0.25 mg total) by mouth daily as needed for Anxiety.  Dispense: 30 tablet; Refill: 0      Abx on hold at pharmacy and only fill if worsens over weekend since Friday  Interested in getting back on SLE meds; chart review not definitive so defer restarting  Will monitor chronic medical issues and continue current plan of care.      Follow up in about 6 months (around 4/13/2024), or if symptoms worsen or fail to improve.

## 2024-03-25 ENCOUNTER — OFFICE VISIT (OUTPATIENT)
Dept: FAMILY MEDICINE | Facility: CLINIC | Age: 34
End: 2024-03-25
Payer: COMMERCIAL

## 2024-03-25 VITALS
OXYGEN SATURATION: 97 % | WEIGHT: 172.31 LBS | DIASTOLIC BLOOD PRESSURE: 70 MMHG | SYSTOLIC BLOOD PRESSURE: 100 MMHG | BODY MASS INDEX: 30.53 KG/M2 | HEART RATE: 82 BPM | HEIGHT: 63 IN

## 2024-03-25 DIAGNOSIS — Z00.00 WELLNESS EXAMINATION: Primary | ICD-10-CM

## 2024-03-25 PROCEDURE — 99999 PR PBB SHADOW E&M-EST. PATIENT-LVL III: CPT | Mod: PBBFAC,,, | Performed by: FAMILY MEDICINE

## 2024-03-25 PROCEDURE — 99395 PREV VISIT EST AGE 18-39: CPT | Mod: S$GLB,,, | Performed by: FAMILY MEDICINE

## 2024-03-25 RX ORDER — FLUOXETINE HYDROCHLORIDE 20 MG/1
20 CAPSULE ORAL DAILY
Qty: 30 CAPSULE | Refills: 5 | Status: SHIPPED | OUTPATIENT
Start: 2024-03-25 | End: 2024-05-16 | Stop reason: SDUPTHER

## 2024-03-25 NOTE — PROGRESS NOTES
Subjective:       Patient ID: Jen Lazar is a 34 y.o. female.    Chief Complaint: Follow-up (Medication refills. Numbness to bilateral great toes)    Here for wellness and follow up multiple chronic medical issues. Doing well overall and in normal state of health.  To get labs in a few weeks.      Follow-up  Pertinent negatives include no chest pain, chills, coughing or fever.     Review of Systems   Constitutional:  Negative for chills and fever.   Respiratory:  Negative for cough, chest tightness and shortness of breath.    Cardiovascular:  Negative for chest pain, palpitations and leg swelling.   Endocrine: Negative for cold intolerance and heat intolerance.   Psychiatric/Behavioral:  Negative for decreased concentration. The patient is not nervous/anxious.        Objective:      Physical Exam  Vitals and nursing note reviewed.   Constitutional:       Appearance: She is well-developed.   HENT:      Head: Normocephalic and atraumatic.   Cardiovascular:      Rate and Rhythm: Normal rate and regular rhythm.      Heart sounds: Normal heart sounds.   Pulmonary:      Effort: Pulmonary effort is normal.      Breath sounds: Normal breath sounds.   Psychiatric:         Mood and Affect: Mood normal.         Behavior: Behavior normal.         Assessment:       1. Wellness examination        Plan:       Wellness examination    Other orders  -     FLUoxetine 20 MG capsule; Take 1 capsule (20 mg total) by mouth once daily.  Dispense: 30 capsule; Refill: 5      Labs soon  F/u with rheum in may as planned  Refill meds  Will monitor chronic medical issues and continue current plan of care.    Follow up in about 6 months (around 9/25/2024), or if symptoms worsen or fail to improve, for Virtual Visit.

## 2024-04-04 ENCOUNTER — LAB VISIT (OUTPATIENT)
Dept: LAB | Facility: HOSPITAL | Age: 34
End: 2024-04-04
Attending: FAMILY MEDICINE
Payer: COMMERCIAL

## 2024-04-04 DIAGNOSIS — Z00.00 WELLNESS EXAMINATION: ICD-10-CM

## 2024-04-04 LAB
ALBUMIN SERPL BCP-MCNC: 3.7 G/DL (ref 3.5–5.2)
ALP SERPL-CCNC: 77 U/L (ref 55–135)
ALT SERPL W/O P-5'-P-CCNC: 10 U/L (ref 10–44)
ANION GAP SERPL CALC-SCNC: 5 MMOL/L (ref 8–16)
AST SERPL-CCNC: 13 U/L (ref 10–40)
BILIRUB SERPL-MCNC: 0.3 MG/DL (ref 0.1–1)
BUN SERPL-MCNC: 17 MG/DL (ref 6–20)
CALCIUM SERPL-MCNC: 9 MG/DL (ref 8.7–10.5)
CHLORIDE SERPL-SCNC: 109 MMOL/L (ref 95–110)
CHOLEST SERPL-MCNC: 161 MG/DL (ref 120–199)
CHOLEST/HDLC SERPL: 3.2 {RATIO} (ref 2–5)
CO2 SERPL-SCNC: 27 MMOL/L (ref 23–29)
CREAT SERPL-MCNC: 0.8 MG/DL (ref 0.5–1.4)
ERYTHROCYTE [DISTWIDTH] IN BLOOD BY AUTOMATED COUNT: 12.4 % (ref 11.5–14.5)
EST. GFR  (NO RACE VARIABLE): >60 ML/MIN/1.73 M^2
ESTIMATED AVG GLUCOSE: 97 MG/DL (ref 68–131)
GLUCOSE SERPL-MCNC: 89 MG/DL (ref 70–110)
HBA1C MFR BLD: 5 % (ref 4–5.6)
HCT VFR BLD AUTO: 38.4 % (ref 37–48.5)
HDLC SERPL-MCNC: 51 MG/DL (ref 40–75)
HDLC SERPL: 31.7 % (ref 20–50)
HGB BLD-MCNC: 12.2 G/DL (ref 12–16)
LDLC SERPL CALC-MCNC: 96.8 MG/DL (ref 63–159)
MCH RBC QN AUTO: 28.8 PG (ref 27–31)
MCHC RBC AUTO-ENTMCNC: 31.8 G/DL (ref 32–36)
MCV RBC AUTO: 91 FL (ref 82–98)
NONHDLC SERPL-MCNC: 110 MG/DL
PLATELET # BLD AUTO: 246 K/UL (ref 150–450)
PMV BLD AUTO: 11.2 FL (ref 9.2–12.9)
POTASSIUM SERPL-SCNC: 4.6 MMOL/L (ref 3.5–5.1)
PROT SERPL-MCNC: 6.5 G/DL (ref 6–8.4)
RBC # BLD AUTO: 4.24 M/UL (ref 4–5.4)
SODIUM SERPL-SCNC: 141 MMOL/L (ref 136–145)
TRIGL SERPL-MCNC: 66 MG/DL (ref 30–150)
TSH SERPL DL<=0.005 MIU/L-ACNC: 0.87 UIU/ML (ref 0.4–4)
WBC # BLD AUTO: 5.15 K/UL (ref 3.9–12.7)

## 2024-04-04 PROCEDURE — 85027 COMPLETE CBC AUTOMATED: CPT | Performed by: FAMILY MEDICINE

## 2024-04-04 PROCEDURE — 83036 HEMOGLOBIN GLYCOSYLATED A1C: CPT | Performed by: FAMILY MEDICINE

## 2024-04-04 PROCEDURE — 36415 COLL VENOUS BLD VENIPUNCTURE: CPT | Mod: PO | Performed by: FAMILY MEDICINE

## 2024-04-04 PROCEDURE — 80061 LIPID PANEL: CPT | Performed by: FAMILY MEDICINE

## 2024-04-04 PROCEDURE — 84443 ASSAY THYROID STIM HORMONE: CPT | Performed by: FAMILY MEDICINE

## 2024-04-04 PROCEDURE — 80053 COMPREHEN METABOLIC PANEL: CPT | Performed by: FAMILY MEDICINE

## 2024-04-24 ENCOUNTER — PATIENT MESSAGE (OUTPATIENT)
Dept: FAMILY MEDICINE | Facility: CLINIC | Age: 34
End: 2024-04-24
Payer: COMMERCIAL

## 2024-04-24 DIAGNOSIS — F41.9 ANXIETY: Primary | ICD-10-CM

## 2024-04-24 NOTE — TELEPHONE ENCOUNTER
No care due was identified.  [facility] Embedded Care Due Messages. Reference number: [id number].   4/24/2024 5:51:01 PM CDT

## 2024-04-25 RX ORDER — ALPRAZOLAM 0.25 MG/1
0.25 TABLET ORAL DAILY PRN
Qty: 30 TABLET | Refills: 0 | Status: SHIPPED | OUTPATIENT
Start: 2024-04-25 | End: 2024-05-25

## 2024-05-15 ENCOUNTER — OFFICE VISIT (OUTPATIENT)
Dept: RHEUMATOLOGY | Facility: CLINIC | Age: 34
End: 2024-05-15
Payer: COMMERCIAL

## 2024-05-15 VITALS
WEIGHT: 176.13 LBS | SYSTOLIC BLOOD PRESSURE: 107 MMHG | HEIGHT: 63 IN | HEART RATE: 77 BPM | DIASTOLIC BLOOD PRESSURE: 75 MMHG | BODY MASS INDEX: 31.21 KG/M2

## 2024-05-15 DIAGNOSIS — M02.369: ICD-10-CM

## 2024-05-15 DIAGNOSIS — G89.29 CHRONIC PAIN OF LEFT KNEE: Primary | ICD-10-CM

## 2024-05-15 DIAGNOSIS — M25.562 CHRONIC PAIN OF LEFT KNEE: Primary | ICD-10-CM

## 2024-05-15 DIAGNOSIS — R76.8 ANA POSITIVE: ICD-10-CM

## 2024-05-15 DIAGNOSIS — M71.22 BAKER CYST, LEFT: ICD-10-CM

## 2024-05-15 DIAGNOSIS — M79.605 LEG PAIN, DIFFUSE, LEFT: ICD-10-CM

## 2024-05-15 PROCEDURE — 99215 OFFICE O/P EST HI 40 MIN: CPT | Mod: 25,S$GLB,, | Performed by: INTERNAL MEDICINE

## 2024-05-15 PROCEDURE — 96372 THER/PROPH/DIAG INJ SC/IM: CPT | Mod: S$GLB,,, | Performed by: INTERNAL MEDICINE

## 2024-05-15 PROCEDURE — 99999 PR PBB SHADOW E&M-EST. PATIENT-LVL III: CPT | Mod: PBBFAC,,, | Performed by: INTERNAL MEDICINE

## 2024-05-15 RX ORDER — SEMAGLUTIDE 0.5 MG/.5ML
0.5 INJECTION, SOLUTION SUBCUTANEOUS
Qty: 2 ML | Refills: 6 | Status: SHIPPED | OUTPATIENT
Start: 2024-05-15 | End: 2024-09-12

## 2024-05-15 RX ORDER — SEMAGLUTIDE 0.25 MG/.5ML
0.25 INJECTION, SOLUTION SUBCUTANEOUS
Qty: 2 ML | Refills: 6 | Status: SHIPPED | OUTPATIENT
Start: 2024-05-15 | End: 2024-08-13

## 2024-05-15 RX ORDER — IBUPROFEN AND FAMOTIDINE 26.6; 8 MG/1; MG/1
1 TABLET ORAL 3 TIMES DAILY
Qty: 90 TABLET | Refills: 12 | Status: SHIPPED | OUTPATIENT
Start: 2024-05-15 | End: 2025-05-15

## 2024-05-15 RX ORDER — KETOROLAC TROMETHAMINE 30 MG/ML
60 INJECTION, SOLUTION INTRAMUSCULAR; INTRAVENOUS
Status: DISCONTINUED | OUTPATIENT
Start: 2024-05-15 | End: 2024-05-15

## 2024-05-15 RX ORDER — CYANOCOBALAMIN 1000 UG/ML
1000 INJECTION, SOLUTION INTRAMUSCULAR; SUBCUTANEOUS
Status: DISCONTINUED | OUTPATIENT
Start: 2024-05-15 | End: 2024-05-15

## 2024-05-15 RX ORDER — KETOROLAC TROMETHAMINE 30 MG/ML
60 INJECTION, SOLUTION INTRAMUSCULAR; INTRAVENOUS
Status: COMPLETED | OUTPATIENT
Start: 2024-05-15 | End: 2024-05-15

## 2024-05-15 RX ORDER — CYANOCOBALAMIN 1000 UG/ML
1000 INJECTION, SOLUTION INTRAMUSCULAR; SUBCUTANEOUS
Status: COMPLETED | OUTPATIENT
Start: 2024-05-15 | End: 2024-05-15

## 2024-05-15 RX ADMIN — KETOROLAC TROMETHAMINE 60 MG: 30 INJECTION, SOLUTION INTRAMUSCULAR; INTRAVENOUS at 11:05

## 2024-05-15 RX ADMIN — CYANOCOBALAMIN 1000 MCG: 1000 INJECTION, SOLUTION INTRAMUSCULAR; SUBCUTANEOUS at 11:05

## 2024-05-15 NOTE — PROGRESS NOTES
Subjective:     Patient ID:  Jen Lazar    Chief Complaint:  Disease Management     History of Present Illness:  Pt is a 34 y.o. female wollow  Up: SLE.  plaquenil BID, she has been  doing fair until she had a torn medial meniscus.   Now has what appears to be a  baker's cyst she was injected behind the knee.  Patient complains of arthralgias and myalgias for which has been present for a few years. Pain is located in multiple joints, both shoulder(s), both elbow(s), both wrist(s), both MCP(s): 1st, 2nd, 3rd, 4th and 5th, both PIP(s): 1st, 2nd, 3rd, 4th and 5th, both DIP(s): 1st and 2nd, both hip(s), both knee(s) and both MTP(s): 1st, 2nd, 3rd, 4th and 5th, is described as aching, pulsating, shooting and throbbing, and is constant, moderate .  Associated symptoms include: crepitation, decreased range of motion, edema, effusion, tenderness and warmth.   Plus roof of the mouth ulcer, am gelling x      Current treatment:  Nothing presently               Associated symptoms include fatigue.   Rheumatologic History:   - Diagnosis/es:  - Positive serologies:  - Infectious screening labs:  - Previous Treatments:  - Current Treatments:     Interval History:   Hospitalization since last office visit: Yes -     Patient Active Problem List    Diagnosis Date Noted    Pulmonary hypertension 12/27/2019    Anxiety 10/12/2018    ADHD (attention deficit hyperactivity disorder), combined type 10/12/2018    PFO (patent foramen ovale)- s/p closure 12/22/2016    Migraine with aura 01/17/2011    Migraine without aura 01/17/2011    Cerebral cysts 10/21/2010    Other malaise and fatigue 07/07/2010    Headache 01/25/2010     Past Surgical History:   Procedure Laterality Date    ASD REPAIR  12/2016    BREAST SURGERY      breast augmentation    GANGLION CYST EXCISION Left     KNEE ARTHROSCOPY Right     occluder      septal occluder in heart     Social History     Tobacco Use    Smoking status: Never    Smokeless tobacco: Never    Substance Use Topics    Alcohol use: No    Drug use: No     Family History   Adopted: Yes   Problem Relation Name Age of Onset    Anemia Mother      Fainting Mother      Hypertension Father      Arrhythmia Father      Congenital heart disease Daughter          pda    No Known Problems Maternal Grandmother      No Known Problems Maternal Grandfather      No Known Problems Paternal Grandmother      No Known Problems Paternal Grandfather      No Known Problems Maternal Aunt      No Known Problems Maternal Uncle      No Known Problems Paternal Aunt      No Known Problems Paternal Uncle      Asthma Neg Hx      Clotting disorder Neg Hx      Heart attack Neg Hx      Heart disease Neg Hx      Heart failure Neg Hx      Hyperlipidemia Neg Hx      Stroke Neg Hx      Atrial Septal Defect Neg Hx      Pacemaker/defibrilator Neg Hx      Early death Neg Hx       Review of patient's allergies indicates:   Allergen Reactions    Codeine Nausea And Vomiting    Vicodin [hydrocodone-acetaminophen] Nausea And Vomiting       Review of Systems   Review of Systems   Constitutional:  Negative for fever and unexpected weight change.   HENT:  Negative for mouth sores and trouble swallowing.    Eyes:  Positive for redness.   Respiratory:  Negative for cough and shortness of breath.    Cardiovascular:  Negative for chest pain.   Gastrointestinal:  Negative for constipation and diarrhea.   Genitourinary:  Negative for dysuria and genital sores.   Skin:  Negative for rash.   Neurological:  Negative for headaches.   Hematological:  Does not bruise/bleed easily.        Current Medications:  Current Outpatient Medications   Medication Instructions    ALPRAZolam (XANAX) 0.25 mg, Oral, Daily PRN    FLUoxetine 20 mg, Oral, Daily    hydroxychloroquine (PLAQUENIL) 200 mg, Oral, 2 times daily    ibuprofen-famotidine (DUEXIS) 800-26.6 mg Tab 1 tablet, Oral, 3 times daily PRN    ibuprofen-famotidine (DUEXIS) 800-26.6 mg Tab 1 tablet, Oral, 3 times daily     "levonorgestreL (MIRENA) 20 mcg/24 hours (6 yrs) 52 mg IUD Mirena 20 mcg/24 hours (6 yrs) 52 mg intrauterine device   Take by intrauterine route.    predniSONE (DELTASONE) 10 MG tablet Take by mouth.    WEGOVY 0.25 mg, Subcutaneous, Every 7 days    WEGOVY 0.5 mg, Subcutaneous, Every 7 days         Objective:     Vitals:    05/15/24 0842   BP: 107/75   Pulse: 77   Weight: 79.9 kg (176 lb 2.4 oz)   Height: 5' 2.99" (1.6 m)   PainSc:   2      Body mass index is 31.21 kg/m².     Physical Examinations:  Physical Exam   Constitutional: She is oriented to person, place, and time.   HENT:   Head: Normocephalic and atraumatic.   Mouth/Throat: Oropharynx is clear and moist.   Eyes: Pupils are equal, round, and reactive to light.   Neck: No thyromegaly present.   Cardiovascular: Normal rate, regular rhythm and normal heart sounds. Exam reveals no gallop and no friction rub.   No murmur heard.  Pulmonary/Chest: Effort normal and breath sounds normal. She has no wheezes. She has no rales. She exhibits no tenderness.   Abdominal: There is no abdominal tenderness. There is no rebound and no guarding.   Musculoskeletal:         General: Tenderness and deformity present.      Right shoulder: Normal.      Left shoulder: Normal.      Right elbow: Normal.      Left elbow: Normal.      Right wrist: Normal.      Left wrist: Normal.      Cervical back: Normal range of motion and neck supple.      Right knee: Normal. No effusion.      Left knee: Normal. No effusion.      Comments: Mild oa   Lymphadenopathy:     She has no cervical adenopathy.   Neurological: She is alert and oriented to person, place, and time. Gait normal.   Skin: No rash noted. No erythema. No pallor.   Psychiatric: Mood and affect normal.   Vitals reviewed.      Right Side Rheumatological Exam     Examination finds the shoulder, elbow, wrist, knee, 1st PIP, 1st MCP, 2nd PIP, 2nd MCP, 3rd PIP, 3rd MCP, 4th PIP, 4th MCP, 5th PIP and 5th MCP normal.    Shoulder Exam "   Tenderness Location: no tenderness    Range of Motion   Active abduction:  abnormal   Adduction: abnormal  Sensation: normal    Knee Exam   Patellofemoral Crepitus: negative  Effusion: negative  Sensation: normal    Hip Exam   Tenderness Location: no tenderness  Sensation: normal    Elbow/Wrist Exam   Tenderness Location: no tenderness  Sensation: normal    Left Side Rheumatological Exam     Examination finds the shoulder, elbow, wrist, knee, 1st PIP, 1st MCP, 2nd PIP, 2nd MCP, 3rd PIP, 3rd MCP, 4th PIP, 4th MCP, 5th PIP and 5th MCP normal.    Shoulder Exam   Tenderness Location: no tenderness    Range of Motion   Active abduction:  abnormal   Sensation: normal    Knee Exam     Patellofemoral Crepitus: negative  Effusion: negative  Sensation: normal    Hip Exam   Tenderness Location: no tenderness  Sensation: normal    Elbow/Wrist Exam   Sensation: normal      Back/Neck Exam   General Inspection   Gait: normal            Disease Assessment Scores:  Patient's Global Assessment of arthritis (0-10): 1  Physician's Global Assessment of arthritis (0-10): 1  Number of Tender Joints (0-28): 1  Number of Swollen Joints (0-28): 1        5/15/2024     8:24 AM   Rapid3 Question Responses and Scores   MDHAQ Score 0.5   Psychologic Score 3.3   Pain Score 2.5   When you awakened in the morning OVER THE LAST WEEK, did you feel stiff? Yes   If Yes, please indicate the number of hours until you are as limber as you will be for the day 1   Fatigue Score 5   Global Health Score 2   RAPID3 Score 2.06       Monitoring Lab Results:  Lab Results   Component Value Date    WBC 5.15 04/04/2024    RBC 4.24 04/04/2024    HGB 12.2 04/04/2024    HCT 38.4 04/04/2024    MCV 91 04/04/2024    MCH 28.8 04/04/2024    MCHC 31.8 (L) 04/04/2024    RDW 12.4 04/04/2024     04/04/2024        Lab Results   Component Value Date     04/04/2024    K 4.6 04/04/2024     04/04/2024    CO2 27 04/04/2024    GLU 89 04/04/2024    BUN 17 04/04/2024  "   CREATININE 0.8 04/04/2024    CALCIUM 9.0 04/04/2024    PROT 6.5 04/04/2024    ALBUMIN 3.7 04/04/2024    BILITOT 0.3 04/04/2024    ALKPHOS 77 04/04/2024    AST 13 04/04/2024    ALT 10 04/04/2024    ANIONGAP 5 (L) 04/04/2024    EGFRNORACEVR >60.0 04/04/2024       Lab Results   Component Value Date    SEDRATE 5 11/29/2021    CRP 0.7 11/29/2021        Lab Results   Component Value Date    DWBAHEWA79ZY 29 (L) 11/29/2021    FSTWJJVM94 464 07/24/2018        Lab Results   Component Value Date    CHOL 161 04/04/2024    HDL 51 04/04/2024    LDLCALC 96.8 04/04/2024    TRIG 66 04/04/2024       Lab Results   Component Value Date    RF <10.0 02/15/2017    CCPANTIBODIE 1.3 02/15/2017     Lab Results   Component Value Date    ANASCREEN Negative <1:80 11/29/2021    DSDNA Negative 1:10 11/29/2021     No results found for: "HLABB27"    Infectious Disease Screening:  Lab Results   Component Value Date    HEPBSAG Negative 09/16/2015     No results found for: "HEPCAB", "HEPCVAB", "HCVQUANTRES"  Lab Results   Component Value Date    QUANTTBGDPL Negative 04/20/2021     Lab Results   Component Value Date    QUANTNILVALU 0.00 04/20/2021    QUANTMITOGEN >10.00 04/20/2021        Imaging: DEXA, Xrays, MRIs, CTs, etc    Old & Outside Medical Records:  Reviewed old and all outside medical records available in Care Everywhere     Assessment:     Encounter Diagnoses   Name Primary?    Chronic pain of left knee Yes    Baker cyst, left     Leg pain, diffuse, left     BMI 31.0-31.9,adult     Reactive arthritis of knee, unspecified laterality        Plan:      Encounter Diagnoses   Name Primary?    Chronic pain of left knee Yes    Baker cyst, left     Leg pain, diffuse, left     BMI 31.0-31.9,adult     Reactive arthritis of knee, unspecified laterality      Jen was seen today for disease management.    Diagnoses and all orders for this visit:    Chronic pain of left knee  -     Cancel: US Lower Extremity Veins Bilateral; Future  -     US " Extremity Non Vascular Limited Left; Future  -     US Lower Extremity Veins Left; Future  -     Follicle Stimulating Hormone; Future  -     Luteinizing Hormone; Future  -     DHEA; Future  -     Testosterone; Future  -     Progesterone; Future  -     Estrogens, Total; Future  -     Cancel: Anti-Thyroglobulin Antibody; Future  -     T4, Free; Future  -     Cancel: Thyroid Peroxidase Antibody; Future  -     TSH; Future  -     T3, Free; Future  -     ibuprofen-famotidine (DUEXIS) 800-26.6 mg Tab; Take 1 tablet by mouth 3 (three) times daily.  -     Discontinue: cyanocobalamin injection 1,000 mcg  -     Discontinue: ketorolac injection 60 mg  -     cyanocobalamin injection 1,000 mcg  -     ketorolac injection 60 mg    Baker cyst, left  -     US Extremity Non Vascular Limited Left; Future  -     US Lower Extremity Veins Left; Future  -     Follicle Stimulating Hormone; Future  -     Luteinizing Hormone; Future  -     DHEA; Future  -     Testosterone; Future  -     Progesterone; Future  -     Estrogens, Total; Future  -     Cancel: Anti-Thyroglobulin Antibody; Future  -     T4, Free; Future  -     Cancel: Thyroid Peroxidase Antibody; Future  -     TSH; Future  -     T3, Free; Future  -     ibuprofen-famotidine (DUEXIS) 800-26.6 mg Tab; Take 1 tablet by mouth 3 (three) times daily.  -     cyanocobalamin injection 1,000 mcg  -     ketorolac injection 60 mg    Leg pain, diffuse, left  -     US Extremity Non Vascular Limited Left; Future  -     US Lower Extremity Veins Left; Future  -     Follicle Stimulating Hormone; Future  -     Luteinizing Hormone; Future  -     DHEA; Future  -     Testosterone; Future  -     Progesterone; Future  -     Estrogens, Total; Future  -     Cancel: Anti-Thyroglobulin Antibody; Future  -     T4, Free; Future  -     Cancel: Thyroid Peroxidase Antibody; Future  -     TSH; Future  -     T3, Free; Future  -     ibuprofen-famotidine (DUEXIS) 800-26.6 mg Tab; Take 1 tablet by mouth 3 (three) times  daily.  -     cyanocobalamin injection 1,000 mcg  -     ketorolac injection 60 mg    BMI 31.0-31.9,adult  -     semaglutide, weight loss, (WEGOVY) 0.25 mg/0.5 mL PnIj; Inject 0.25 mg into the skin every 7 days.  -     semaglutide, weight loss, (WEGOVY) 0.5 mg/0.5 mL PnIj; Inject 0.5 mg into the skin every 7 days.  -     Follicle Stimulating Hormone; Future  -     Luteinizing Hormone; Future  -     DHEA; Future  -     Testosterone; Future  -     Progesterone; Future  -     Estrogens, Total; Future  -     Cancel: Anti-Thyroglobulin Antibody; Future  -     T4, Free; Future  -     Cancel: Thyroid Peroxidase Antibody; Future  -     TSH; Future  -     T3, Free; Future  -     ibuprofen-famotidine (DUEXIS) 800-26.6 mg Tab; Take 1 tablet by mouth 3 (three) times daily.  -     cyanocobalamin injection 1,000 mcg  -     ketorolac injection 60 mg    Reactive arthritis of knee, unspecified laterality  -     Follicle Stimulating Hormone; Future  -     Luteinizing Hormone; Future  -     DHEA; Future  -     Testosterone; Future  -     Progesterone; Future  -     Estrogens, Total; Future  -     Cancel: Anti-Thyroglobulin Antibody; Future  -     T4, Free; Future  -     Cancel: Thyroid Peroxidase Antibody; Future  -     TSH; Future  -     T3, Free; Future  -     ibuprofen-famotidine (DUEXIS) 800-26.6 mg Tab; Take 1 tablet by mouth 3 (three) times daily.  -     Discontinue: cyanocobalamin injection 1,000 mcg  -     Discontinue: ketorolac injection 60 mg  -     cyanocobalamin injection 1,000 mcg  -     ketorolac injection 60 mg    BISI positive  -     BISI Screen w/Reflex; Future  -     cyanocobalamin injection 1,000 mcg  -     ketorolac injection 60 mg        1. Add duexis  2. wegovy  3. F/u 4 to 5 month   Pt has tried weight watchers, [place], Hodge diet. She has counted calories and counted carbohydrates. She has tried intermittent  Fasting. She has done physical therapy. She has had a  and has seen a nutritionist.  She has lost 10 % of her body weight but is presently at a standstill with her weight  Follow-up 4 months  More than 50% of the  40 minute encounter was spent face to face counseling the patient regarding current status and future plan of care as well as side effects  of the medications. All questions were answered to patient's satisfaction also includes  non-face to face time preparing to see the patient (eg, review of tests), Obtaining and/or reviewing separately obtained history, Documenting clinical information in the electronic or other health record, Independently interpreting results

## 2024-05-16 ENCOUNTER — PATIENT MESSAGE (OUTPATIENT)
Dept: FAMILY MEDICINE | Facility: CLINIC | Age: 34
End: 2024-05-16
Payer: COMMERCIAL

## 2024-05-16 DIAGNOSIS — F41.9 ANXIETY: Primary | ICD-10-CM

## 2024-05-16 NOTE — TELEPHONE ENCOUNTER
No care due was identified.  [facility] Embedded Care Due Messages. Reference number: [id number].   5/16/2024 4:10:15 PM CDT

## 2024-05-17 RX ORDER — FLUOXETINE HYDROCHLORIDE 40 MG/1
40 CAPSULE ORAL DAILY
Qty: 90 CAPSULE | Refills: 1 | Status: SHIPPED | OUTPATIENT
Start: 2024-05-17 | End: 2025-05-17

## 2024-05-20 ENCOUNTER — HOSPITAL ENCOUNTER (OUTPATIENT)
Dept: RADIOLOGY | Facility: HOSPITAL | Age: 34
Discharge: HOME | End: 2024-05-20
Attending: INTERNAL MEDICINE
Payer: COMMERCIAL

## 2024-05-20 DIAGNOSIS — M79.605 LEG PAIN, DIFFUSE, LEFT: ICD-10-CM

## 2024-05-20 DIAGNOSIS — M71.22 BAKER CYST, LEFT: ICD-10-CM

## 2024-05-20 DIAGNOSIS — G89.29 CHRONIC PAIN OF LEFT KNEE: ICD-10-CM

## 2024-05-20 DIAGNOSIS — M25.562 CHRONIC PAIN OF LEFT KNEE: ICD-10-CM

## 2024-05-20 PROCEDURE — 93971 EXTREMITY STUDY: CPT | Mod: 26,LT,, | Performed by: RADIOLOGY

## 2024-05-20 PROCEDURE — 93971 EXTREMITY STUDY: CPT | Mod: TC,PO,LT

## 2024-06-07 ENCOUNTER — PATIENT MESSAGE (OUTPATIENT)
Dept: RHEUMATOLOGY | Facility: CLINIC | Age: 34
End: 2024-06-07
Payer: COMMERCIAL

## 2024-06-19 ENCOUNTER — TELEPHONE (OUTPATIENT)
Dept: RHEUMATOLOGY | Facility: CLINIC | Age: 34
End: 2024-06-19
Payer: COMMERCIAL

## 2024-06-19 NOTE — TELEPHONE ENCOUNTER
----- Message from Madeleine Marcelo sent at 6/19/2024  4:02 PM CDT -----  Regarding: rx for lupus  Contact: pt  Type:  Needs Medical Advice    Who Called: pt    Symptoms (please be specific): eye inflammation from lupus     How long has patient had these symptoms:  2 weeks    Pharmacy name and phone #:    [id number] - [place] - [id number]  [id number]  [place] [address]  Phone: [phone] Fax: [phone]    Would the patient rather a call back or a response via [facility]? Call back    Best Call Back Number: [phone]    Additional Information: inflamation in the eye from lupus.  Pt is asking if she should be put on a rx for lupus.

## 2024-06-24 ENCOUNTER — OFFICE VISIT (OUTPATIENT)
Dept: RHEUMATOLOGY | Facility: CLINIC | Age: 34
End: 2024-06-24
Payer: COMMERCIAL

## 2024-06-24 ENCOUNTER — LAB VISIT (OUTPATIENT)
Dept: LAB | Facility: HOSPITAL | Age: 34
End: 2024-06-24
Attending: INTERNAL MEDICINE
Payer: COMMERCIAL

## 2024-06-24 VITALS
HEART RATE: 71 BPM | BODY MASS INDEX: 28.7 KG/M2 | WEIGHT: 162 LBS | DIASTOLIC BLOOD PRESSURE: 75 MMHG | SYSTOLIC BLOOD PRESSURE: 108 MMHG

## 2024-06-24 DIAGNOSIS — M32.9 SYSTEMIC LUPUS ERYTHEMATOSUS, UNSPECIFIED SLE TYPE, UNSPECIFIED ORGAN INVOLVEMENT STATUS: Primary | ICD-10-CM

## 2024-06-24 DIAGNOSIS — R76.8 ANA POSITIVE: ICD-10-CM

## 2024-06-24 DIAGNOSIS — D84.9 IMMUNOCOMPROMISED: ICD-10-CM

## 2024-06-24 DIAGNOSIS — Z79.899 HIGH RISK MEDICATION USE: ICD-10-CM

## 2024-06-24 DIAGNOSIS — Z71.85 IMMUNIZATION COUNSELING: ICD-10-CM

## 2024-06-24 DIAGNOSIS — M32.9 SYSTEMIC LUPUS ERYTHEMATOSUS, UNSPECIFIED SLE TYPE, UNSPECIFIED ORGAN INVOLVEMENT STATUS: ICD-10-CM

## 2024-06-24 DIAGNOSIS — Z71.9 HEALTH EDUCATION/COUNSELING: ICD-10-CM

## 2024-06-24 DIAGNOSIS — K59.00 CONSTIPATION, UNSPECIFIED CONSTIPATION TYPE: ICD-10-CM

## 2024-06-24 DIAGNOSIS — Z71.89 ENCOUNTER FOR INJECTION EDUCATION: ICD-10-CM

## 2024-06-24 DIAGNOSIS — R63.4 WEIGHT LOSS: ICD-10-CM

## 2024-06-24 LAB
HBV CORE AB SERPL QL IA: NORMAL
HBV SURFACE AG SERPL QL IA: NORMAL
HCV AB SERPL QL IA: NORMAL

## 2024-06-24 PROCEDURE — 36415 COLL VENOUS BLD VENIPUNCTURE: CPT | Mod: PO | Performed by: INTERNAL MEDICINE

## 2024-06-24 PROCEDURE — 99999 PR PBB SHADOW E&M-EST. PATIENT-LVL III: CPT | Mod: PBBFAC,,,

## 2024-06-24 PROCEDURE — 87340 HEPATITIS B SURFACE AG IA: CPT | Performed by: INTERNAL MEDICINE

## 2024-06-24 PROCEDURE — 86706 HEP B SURFACE ANTIBODY: CPT | Performed by: INTERNAL MEDICINE

## 2024-06-24 PROCEDURE — 86704 HEP B CORE ANTIBODY TOTAL: CPT | Performed by: INTERNAL MEDICINE

## 2024-06-24 PROCEDURE — 86803 HEPATITIS C AB TEST: CPT | Performed by: INTERNAL MEDICINE

## 2024-06-24 RX ORDER — PREDNISOLONE ACETATE 10 MG/ML
1 SUSPENSION/ DROPS OPHTHALMIC 4 TIMES DAILY
COMMUNITY
Start: 2024-06-11

## 2024-06-24 RX ORDER — PREDNISONE 2.5 MG/1
2.5 TABLET ORAL DAILY PRN
Qty: 30 TABLET | Refills: 6 | Status: SHIPPED | OUTPATIENT
Start: 2024-06-24

## 2024-06-24 RX ORDER — BELIMUMAB 200 MG/ML
200 SOLUTION SUBCUTANEOUS
Qty: 4 ML | Refills: 11 | Status: ACTIVE | OUTPATIENT
Start: 2024-06-24

## 2024-06-24 NOTE — PROGRESS NOTES
Time: 33 mins  # of Dx: 9    Subjective:     Patient ID:  Jen Lazar    Chief Complaint: Discussion of Treatment Options     Rheumatology Provider: Dr. Mims    History of Present Illness:  Pt is a 34 y.o. female who presents to the clinic for discussion of treatment options. This is a new patient to me but has been following with Dr. Mims. Patient is presenting with pulmonary hypertension, anxiety, ADHD, migraines, and SLE. Last clinic visit was 5/15/2024. Patient sent a [facility] message on 6/19/24 that her previous eye issue had got better with the prednisone drops but came back and now worse. Went to the eye doctor and told that she needs to be started on SLE medication as the inflammation has spread to her cornea. She was prescribed steroid eye drops, an antihistamine, and an eye lubricant.     Prior Rheum Therapies:   HCQ    SLE:  Seems to be getting better; has a f/u with eye doctor later today  Using antihistamine, eye wash, and prednisone eye drops  Wearing glasses vs contacts right now  Was on HCQ and stopped taking it years ago because was going to the beach and had reaction to the sun but still getting when she is in the sun even though no longer taking HCQ  Outside of eye inflammation, having joint pain, and possible hemorrhoids that is not bleeding  Has noticed increased constipation and straining; stated that her bottom has been hurting but starting to feel better  Stated that she has not been drinking enough water and will try to increase intake  Having generalized pain all over and pain in knees; mainly in hands, fingers, and knees  Discussed and showed demo pen Benlysta SC  Patient stated that she is open to trying Benlysta    Weight loss:  Has been taking Wegovy; recently increased to 0.5 mg weekly and stated that she is doing well so far without any issues  Had questions about the dosing; reviewed and answered questions    Vaccines:  Recommend influenza annually, prevnar 20 if not  completed yet, TdaP every 10 years, shingrix x 2, COVID, and RSV if 60 years old or older  Patient is due for Influenza (annually), Prevnar 20 (once), COVID, and Shingrix (2 doses 2-6 months apart once in lifetime)  Patient declined the following vaccines: COVID    Influenza: Discuss and recommend annually. Due next flu season starting around Sept./Oct.   PCV 20: Discuss and recommend 1 dose  Tetanus (TdaP): Discuss and recommend booster every 10 years. 2018. Due 2028  Shingrix: Discuss and recommend. 2 dose series 2-6 months apart. Due now  Hepatitis B: Unknown if immune. Needs labs. Completed 5/2021 & 6/2021   Covid: CDC recommends 1 new [id number] dose in immunocompromised pts that has received 2 or more COVID doses. Declined    For COVID vaccines, ACR recommends holding Benlysta SC for 1-2 weeks (as disease activity allows) after each COVID vaccines dose      Reviewed allergies and all current medications including OTC, herbals, and supplements.     Current Medications:  Current Outpatient Medications   Medication Instructions    ALPRAZolam (XANAX) 0.25 mg, Oral, Daily PRN    FLUoxetine 40 mg, Oral, Daily    hydroxychloroquine (PLAQUENIL) 200 mg, Oral, 2 times daily    ibuprofen-famotidine (DUEXIS) 800-26.6 mg Tab 1 tablet, Oral, 3 times daily    predniSONE (DELTASONE) 10 MG tablet Take by mouth.    WEGOVY 0.25 mg, Subcutaneous, Every 7 days    WEGOVY 0.5 mg, Subcutaneous, Every 7 days     Objective:     Vitals:    06/24/24 1457   BP: 108/75   Pulse: 71   Weight: 73.5 kg (162 lb)   PainSc:   2   PainLoc: Hand      Body mass index is 28.7 kg/m².          5/15/2024     8:24 AM   Rapid3 Question Responses and Scores   MDHAQ Score 0.5   Psychologic Score 3.3   Pain Score 2.5   When you awakened in the morning OVER THE LAST WEEK, did you feel stiff? Yes   If Yes, please indicate the number of hours until you are as limber as you will be for the day 1   Fatigue Score 5   Global Health Score 2   RAPID3 Score 2.06  "    Monitoring Lab Results:  Lab Results   Component Value Date    WBC 5.15 04/04/2024    RBC 4.24 04/04/2024    HGB 12.2 04/04/2024    HCT 38.4 04/04/2024    MCV 91 04/04/2024    MCH 28.8 04/04/2024    MCHC 31.8 (L) 04/04/2024    RDW 12.4 04/04/2024     04/04/2024      Lab Results   Component Value Date     04/04/2024    K 4.6 04/04/2024     04/04/2024    CO2 27 04/04/2024    GLU 89 04/04/2024    BUN 17 04/04/2024    CREATININE 0.8 04/04/2024    CALCIUM 9.0 04/04/2024    PROT 6.5 04/04/2024    ALBUMIN 3.7 04/04/2024    BILITOT 0.3 04/04/2024    ALKPHOS 77 04/04/2024    AST 13 04/04/2024    ALT 10 04/04/2024    ANIONGAP 5 (L) 04/04/2024    EGFRNORACEVR >60.0 04/04/2024     Lab Results   Component Value Date    SEDRATE 5 11/29/2021    CRP 0.7 11/29/2021      Lab Results   Component Value Date    RF <10.0 02/15/2017    CCPANTIBODIE 1.3 02/15/2017     Lab Results   Component Value Date    ANASCREEN Positive (A) 05/20/2024    ANATITER 1:160 05/20/2024    DSDNA Negative 1:10 05/20/2024     Infectious Disease Screening:  Lab Results   Component Value Date    HEPBSAG Negative 09/16/2015     No results found for: "HEPCAB", "HEPCVAB", "HCVQUANTRES"  Lab Results   Component Value Date    QUANTTBGDPL Negative 04/20/2021     Assessment:     Patient  is a 34 y.o. female with systemic lupus erythematosus was referred to the Rheumatology pharmacist for discussion of treatment options. Will initiate belimumab (BENLYSTA) SC. Provided pt with education (MOA, frequency, route of administration, onset of action, injection instructions and safety profile) on belimumab (BENLYSTA) SC. Counseled pt on how to inject new biologic therapy. Patient to inject 200 mg SC weekly. Explained importance of vaccines considering the increased risk of infections. Encouraged pt to practice proper hand hygiene considering increased risk of infection with biologics and to avoid raw seafood/live vaccines. Reviewed needed labs to ensure " medication is being used safely. Having some constipation and possible hemorrhoid. Recommend that patient increase water intake. Increase fiber rich foods (ex: apples) or try metamucil, or stool softener. If hemorrhoid is painful, can try OTC hemorrhoid cream to soothe. Aware that if it starts to bleed or worsens to reach out to her PCP or go to urgent care for evaluation. Discussed Wegovy dosing and answered questions.     Plan:      Encounter Diagnoses   Name Primary?    Systemic lupus erythematosus, unspecified SLE type, unspecified organ involvement status Yes    BISI positive     Health education/counseling     Encounter for injection education     Immunization counseling     Immunocompromised     High risk medication use      1. Discussion of Treatment Options: .   Initiate Benlysta 200 mg weekly; sent to [facility]  Initiate Prednisone PRN; sent to pharmacy   Patient to make us aware if they ever experiences s/sx of an infection including fever, chills, URI symptoms or UTI symptoms.  Provided patient with handouts on education about their biologic and how to administer it  Patient verbalized understanding instructions    2. Constipation:  Increase water intake  Increase fiber rich foods or try metamucil   Initiate stool softener as needed such as Colace  Try OTC hemorrhoid cream PRN for discomfort   Monitor and if worsens see PCP or go to urgent care    3. Weight Loss:  Continue Wegovy 0.5 mg SC weekly    4. Health Maintenance  Vaccines needed: Influenza, PCV 20, 1 new COVID dose, and Shingles    Pt agreed to complete vaccines except COVID  Counseled patient to discuss COVID vaccines further with provider as ACR recommends holding Benlysta SC 1-2 weeks after each COVID vaccine if disease activity allows  Ordered needed labs - Hep B, Hep C, and TB    5. Medication Rec  Reviewed and updated     Follow-up scheduled on 9/19/2024 with JAQUELIN Deleon, PharmD, BCPS  Rheumatology Clinical  Pharmacist  [facility]

## 2024-06-24 NOTE — PATIENT INSTRUCTIONS
It was a pleasure talking with you today. As a reminder, my name is Dr. Melanie Almendarez. I'm the clinical pharmacist in the Rheumatology office. If you have any questions or need any assistance, please reach out to the office.    - Vaccines:   Pneumonia (Prevnar 20): 1 dose - Primary care provider's (PCP's) office or local pharmacy  Influenza (Flu): 1 dose annually starting around Sept. - Local pharmacy or PCP's office  Shingles (Shingrix): 2 doses 2-6 months apart - PCP's office or local pharmacy

## 2024-06-28 LAB
HBV SURFACE AB SER QL IA: POSITIVE
HBV SURFACE AB SERPL IA-ACNC: 197 MIU/ML

## 2024-08-04 ENCOUNTER — PATIENT MESSAGE (OUTPATIENT)
Dept: RHEUMATOLOGY | Facility: CLINIC | Age: 34
End: 2024-08-04
Payer: COMMERCIAL

## 2024-08-29 DIAGNOSIS — R76.8 ANA POSITIVE: ICD-10-CM

## 2024-08-29 DIAGNOSIS — M32.9 SYSTEMIC LUPUS ERYTHEMATOSUS, UNSPECIFIED SLE TYPE, UNSPECIFIED ORGAN INVOLVEMENT STATUS: ICD-10-CM

## 2024-08-29 RX ORDER — BELIMUMAB 200 MG/ML
200 SOLUTION SUBCUTANEOUS
Qty: 4 ML | Refills: 11 | Status: CANCELLED | OUTPATIENT
Start: 2024-08-29

## 2024-09-27 ENCOUNTER — OFFICE VISIT (OUTPATIENT)
Dept: FAMILY MEDICINE | Facility: CLINIC | Age: 34
End: 2024-09-27
Payer: COMMERCIAL

## 2024-09-27 VITALS
HEIGHT: 62 IN | HEART RATE: 80 BPM | WEIGHT: 151 LBS | OXYGEN SATURATION: 98 % | BODY MASS INDEX: 27.79 KG/M2 | SYSTOLIC BLOOD PRESSURE: 110 MMHG | DIASTOLIC BLOOD PRESSURE: 70 MMHG

## 2024-09-27 DIAGNOSIS — Z00.00 WELLNESS EXAMINATION: ICD-10-CM

## 2024-09-27 DIAGNOSIS — M32.9 SYSTEMIC LUPUS ERYTHEMATOSUS, UNSPECIFIED SLE TYPE, UNSPECIFIED ORGAN INVOLVEMENT STATUS: Primary | ICD-10-CM

## 2024-09-27 DIAGNOSIS — F41.9 ANXIETY: ICD-10-CM

## 2024-09-27 PROCEDURE — 99999 PR PBB SHADOW E&M-EST. PATIENT-LVL III: CPT | Mod: PBBFAC,,, | Performed by: FAMILY MEDICINE

## 2024-09-27 RX ORDER — FLUOXETINE HYDROCHLORIDE 40 MG/1
40 CAPSULE ORAL DAILY
Qty: 90 CAPSULE | Refills: 1 | Status: SHIPPED | OUTPATIENT
Start: 2024-09-27 | End: 2025-09-27

## 2024-09-27 RX ORDER — ALPRAZOLAM 0.25 MG/1
0.25 TABLET ORAL DAILY PRN
Qty: 30 TABLET | Refills: 1 | Status: SHIPPED | OUTPATIENT
Start: 2024-09-27

## 2024-09-27 NOTE — PROGRESS NOTES
Subjective:       Patient ID: Jen Lazar is a 34 y.o. female.    Chief Complaint: Follow-up (6 month )    Here for follow up multiple chronic medical issues. Doing well overall and in normal state of health.      Follow-up  Pertinent negatives include no chest pain, chills, coughing or fever.     Review of Systems   Constitutional:  Negative for chills and fever.   Respiratory:  Negative for cough, chest tightness and shortness of breath.    Cardiovascular:  Negative for chest pain, palpitations and leg swelling.   Endocrine: Negative for cold intolerance and heat intolerance.   Psychiatric/Behavioral:  Negative for decreased concentration. The patient is not nervous/anxious.        Objective:      Physical Exam  Vitals and nursing note reviewed.   Constitutional:       Appearance: She is well-developed.   HENT:      Head: Normocephalic and atraumatic.   Cardiovascular:      Rate and Rhythm: Normal rate and regular rhythm.      Heart sounds: Normal heart sounds.   Pulmonary:      Effort: Pulmonary effort is normal.      Breath sounds: Normal breath sounds.   Psychiatric:         Mood and Affect: Mood normal.         Behavior: Behavior normal.         Assessment:       1. Systemic lupus erythematosus, unspecified SLE type, unspecified organ involvement status    2. Wellness examination    3. Anxiety        Plan:       Systemic lupus erythematosus, unspecified SLE type, unspecified organ involvement status    Wellness examination  -     CBC Without Differential; Future; Expected date: 09/27/2024  -     Comprehensive Metabolic Panel; Future; Expected date: 09/27/2024  -     Hemoglobin A1C; Future; Expected date: 09/27/2024  -     Lipid Panel; Future; Expected date: 09/27/2024  -     TSH; Future; Expected date: 09/27/2024    Anxiety  -     ALPRAZolam (XANAX) 0.25 MG tablet; Take 1 tablet (0.25 mg total) by mouth daily as needed for Anxiety.  Dispense: 30 tablet; Refill: 1  -     FLUoxetine 40 MG capsule; Take 1  capsule (40 mg total) by mouth once daily.  Dispense: 90 capsule; Refill: 1      Message to rheum as current therapy refill was denied    Refill pnr med as working well for anxiety  Return precautions discussed  Will monitor chronic medical issues and continue current plan of care.  Visit today included increased complexity associated with the care of the episodic problem anxiety addressed and managing the longitudinal care of the patient due to the serious and/or complex managed problem(s) as above.    Follow up in about 6 months (around 3/27/2025), or if symptoms worsen or fail to improve.

## 2024-12-08 ENCOUNTER — PATIENT MESSAGE (OUTPATIENT)
Dept: RHEUMATOLOGY | Facility: CLINIC | Age: 34
End: 2024-12-08
Payer: COMMERCIAL

## 2025-01-07 DIAGNOSIS — M32.9 SYSTEMIC LUPUS ERYTHEMATOSUS, UNSPECIFIED SLE TYPE, UNSPECIFIED ORGAN INVOLVEMENT STATUS: ICD-10-CM

## 2025-01-07 DIAGNOSIS — R76.8 ANA POSITIVE: ICD-10-CM

## 2025-01-07 RX ORDER — SEMAGLUTIDE 1 MG/.5ML
1 INJECTION, SOLUTION SUBCUTANEOUS
Qty: 2 ML | Refills: 5 | Status: SHIPPED | OUTPATIENT
Start: 2025-01-07 | End: 2025-07-06

## 2025-01-07 RX ORDER — BELIMUMAB 200 MG/ML
200 SOLUTION SUBCUTANEOUS
Qty: 4 ML | Refills: 11 | Status: SHIPPED | OUTPATIENT
Start: 2025-01-07

## 2025-01-07 NOTE — TELEPHONE ENCOUNTER
----- Message from Leonila sent at 1/7/2025 12:35 PM CST -----  Regarding: Needs call back TODAY  Type: Needs Medical Advice  Who Called:  Pt    Best Call Back Number: [phone]    Additional Information: Pt is upset she has been trying to get her medications refilled since 12/8 with no response from the office or anyone, She is trying to get her wegovy and benlysta called in asap please call the patient today when the message is sent for a update on medications

## 2025-01-15 ENCOUNTER — OFFICE VISIT (OUTPATIENT)
Dept: RHEUMATOLOGY | Facility: CLINIC | Age: 35
End: 2025-01-15
Payer: COMMERCIAL

## 2025-01-15 VITALS
BODY MASS INDEX: 26.25 KG/M2 | SYSTOLIC BLOOD PRESSURE: 104 MMHG | HEART RATE: 90 BPM | WEIGHT: 142.63 LBS | HEIGHT: 62 IN | DIASTOLIC BLOOD PRESSURE: 71 MMHG

## 2025-01-15 DIAGNOSIS — G89.29 CHRONIC PAIN OF LEFT KNEE: ICD-10-CM

## 2025-01-15 DIAGNOSIS — D84.9 IMMUNOCOMPROMISED: ICD-10-CM

## 2025-01-15 DIAGNOSIS — J40 BRONCHITIS: Primary | ICD-10-CM

## 2025-01-15 DIAGNOSIS — M02.369: ICD-10-CM

## 2025-01-15 DIAGNOSIS — M79.605 LEG PAIN, DIFFUSE, LEFT: ICD-10-CM

## 2025-01-15 DIAGNOSIS — M32.9 SYSTEMIC LUPUS ERYTHEMATOSUS, UNSPECIFIED SLE TYPE, UNSPECIFIED ORGAN INVOLVEMENT STATUS: ICD-10-CM

## 2025-01-15 DIAGNOSIS — F41.9 ANXIETY: ICD-10-CM

## 2025-01-15 DIAGNOSIS — M71.22 BAKER CYST, LEFT: ICD-10-CM

## 2025-01-15 DIAGNOSIS — M25.562 CHRONIC PAIN OF LEFT KNEE: ICD-10-CM

## 2025-01-15 DIAGNOSIS — R76.8 ANA POSITIVE: ICD-10-CM

## 2025-01-15 PROCEDURE — 96372 THER/PROPH/DIAG INJ SC/IM: CPT | Mod: S$GLB,,, | Performed by: INTERNAL MEDICINE

## 2025-01-15 PROCEDURE — 99999 PR PBB SHADOW E&M-EST. PATIENT-LVL III: CPT | Mod: PBBFAC,,, | Performed by: INTERNAL MEDICINE

## 2025-01-15 PROCEDURE — 99215 OFFICE O/P EST HI 40 MIN: CPT | Mod: 25,S$GLB,, | Performed by: INTERNAL MEDICINE

## 2025-01-15 RX ORDER — EPINEPHRINE 0.3 MG/.3ML
0.3 INJECTION SUBCUTANEOUS ONCE AS NEEDED
OUTPATIENT
Start: 2025-01-15

## 2025-01-15 RX ORDER — ONDANSETRON HYDROCHLORIDE 2 MG/ML
4 INJECTION, SOLUTION INTRAVENOUS
OUTPATIENT
Start: 2025-01-15

## 2025-01-15 RX ORDER — IBUPROFEN AND FAMOTIDINE 26.6; 8 MG/1; MG/1
1 TABLET ORAL 3 TIMES DAILY
Qty: 90 TABLET | Refills: 12 | Status: SHIPPED | OUTPATIENT
Start: 2025-01-15 | End: 2026-01-15

## 2025-01-15 RX ORDER — CLARITHROMYCIN 500 MG/1
500 TABLET, FILM COATED ORAL EVERY 12 HOURS
Qty: 20 TABLET | Refills: 0 | Status: SHIPPED | OUTPATIENT
Start: 2025-01-15 | End: 2025-01-25

## 2025-01-15 RX ORDER — DIPHENHYDRAMINE HYDROCHLORIDE 50 MG/ML
25 INJECTION INTRAMUSCULAR; INTRAVENOUS
OUTPATIENT
Start: 2025-01-15

## 2025-01-15 RX ORDER — CEFTRIAXONE 1 G/1
1 INJECTION, POWDER, FOR SOLUTION INTRAMUSCULAR; INTRAVENOUS
Status: COMPLETED | OUTPATIENT
Start: 2025-01-15 | End: 2025-01-15

## 2025-01-15 RX ORDER — FLUCONAZOLE 150 MG/1
150 TABLET ORAL DAILY
Qty: 1 TABLET | Refills: 0 | Status: SHIPPED | OUTPATIENT
Start: 2025-01-15 | End: 2025-01-16

## 2025-01-15 RX ORDER — PREDNISONE 2.5 MG/1
2.5 TABLET ORAL DAILY PRN
Qty: 30 TABLET | Refills: 6 | Status: SHIPPED | OUTPATIENT
Start: 2025-01-15

## 2025-01-15 RX ORDER — ALPRAZOLAM 0.25 MG/1
0.25 TABLET ORAL DAILY PRN
Qty: 30 TABLET | Refills: 1 | Status: SHIPPED | OUTPATIENT
Start: 2025-01-15

## 2025-01-15 RX ORDER — DEXAMETHASONE SODIUM PHOSPHATE 4 MG/ML
8 INJECTION, SOLUTION INTRA-ARTICULAR; INTRALESIONAL; INTRAMUSCULAR; INTRAVENOUS; SOFT TISSUE
Status: COMPLETED | OUTPATIENT
Start: 2025-01-15 | End: 2025-01-15

## 2025-01-15 RX ORDER — PROMETHAZINE HYDROCHLORIDE AND DEXTROMETHORPHAN HYDROBROMIDE 6.25; 15 MG/5ML; MG/5ML
5 SYRUP ORAL EVERY 4 HOURS PRN
Qty: 240 ML | Refills: 0 | Status: SHIPPED | OUTPATIENT
Start: 2025-01-15 | End: 2025-01-25

## 2025-01-15 RX ORDER — KETOROLAC TROMETHAMINE 30 MG/ML
30 INJECTION, SOLUTION INTRAMUSCULAR; INTRAVENOUS
OUTPATIENT
Start: 2025-01-15

## 2025-01-15 RX ORDER — HEPARIN 100 UNIT/ML
500 SYRINGE INTRAVENOUS
OUTPATIENT
Start: 2025-01-15

## 2025-01-15 RX ORDER — KETOROLAC TROMETHAMINE 30 MG/ML
60 INJECTION, SOLUTION INTRAMUSCULAR; INTRAVENOUS
Status: COMPLETED | OUTPATIENT
Start: 2025-01-15 | End: 2025-01-15

## 2025-01-15 RX ORDER — DIPHENHYDRAMINE HYDROCHLORIDE 50 MG/ML
50 INJECTION INTRAMUSCULAR; INTRAVENOUS ONCE AS NEEDED
OUTPATIENT
Start: 2025-01-15

## 2025-01-15 RX ORDER — SODIUM CHLORIDE 0.9 % (FLUSH) 0.9 %
10 SYRINGE (ML) INJECTION
OUTPATIENT
Start: 2025-01-15

## 2025-01-15 RX ORDER — ACETAMINOPHEN 325 MG/1
650 TABLET ORAL
OUTPATIENT
Start: 2025-01-15

## 2025-01-15 RX ORDER — METHYLPREDNISOLONE SOD SUCC 125 MG
100 VIAL (EA) INJECTION
OUTPATIENT
Start: 2025-01-15

## 2025-01-15 RX ADMIN — KETOROLAC TROMETHAMINE 60 MG: 30 INJECTION, SOLUTION INTRAMUSCULAR; INTRAVENOUS at 10:01

## 2025-01-15 RX ADMIN — CEFTRIAXONE 1 G: 1 INJECTION, POWDER, FOR SOLUTION INTRAMUSCULAR; INTRAVENOUS at 10:01

## 2025-01-15 RX ADMIN — DEXAMETHASONE SODIUM PHOSPHATE 8 MG: 4 INJECTION, SOLUTION INTRA-ARTICULAR; INTRALESIONAL; INTRAMUSCULAR; INTRAVENOUS; SOFT TISSUE at 10:01

## 2025-01-15 ASSESSMENT — ROUTINE ASSESSMENT OF PATIENT INDEX DATA (RAPID3)
PATIENT GLOBAL ASSESSMENT SCORE: 4.5
PSYCHOLOGICAL DISTRESS SCORE: 2.2
FATIGUE SCORE: 1.1
PAIN SCORE: 3
TOTAL RAPID3 SCORE: 3.06
MDHAQ FUNCTION SCORE: 0.5

## 2025-01-15 NOTE — PROGRESS NOTES
Subjective:     Patient ID:  Jen Lazar    Chief Complaint:  Disease Management     History of Present Illness:  Pt is a 34 y.o. female  dx w/ SLE.  plaquenil BID, she has been  doing fair until she had a torn medial meniscus.   Now has what appears to be a  baker's cyst she was injected behind the knee.  Patient complains of arthralgias and myalgias for which has been present for a few years. Pain is located in multiple joints, both shoulder(s), both elbow(s), both wrist(s), both MCP(s): 1st, 2nd, 3rd, 4th and 5th, both PIP(s): 1st, 2nd, 3rd, 4th and 5th, both DIP(s): 1st and 2nd, both hip(s), both knee(s) and both MTP(s): 1st, 2nd, 3rd, 4th and 5th, is described as aching, pulsating, shooting and throbbing, and is constant, moderate .  Associated symptoms include: crepitation, decreased range of motion, edema, effusion, tenderness and warmth.   Plus roof of the mouth ulcer, am gelling x        Current treatment: plaquenil, benlysta            Rheumatologic History:   - Diagnosis/es:  - Positive serologies:  - Infectious screening labs:  - Previous Treatments:  - Current Treatments:     Interval History:   Hospitalization since last office visit: No    Patient Active Problem List    Diagnosis Date Noted    Pulmonary hypertension 12/27/2019    Anxiety 10/12/2018    ADHD (attention deficit hyperactivity disorder), combined type 10/12/2018    Systemic lupus erythematosus 02/08/2018    PFO (patent foramen ovale)- s/p closure 12/22/2016    Migraine with aura 01/17/2011    Migraine without aura 01/17/2011    Cerebral cysts 10/21/2010    Other malaise and fatigue 07/07/2010    Headache 01/25/2010     Past Surgical History:   Procedure Laterality Date    ASD REPAIR  12/2016    BREAST SURGERY      breast augmentation    GANGLION CYST EXCISION Left     KNEE ARTHROSCOPY Right     occluder      septal occluder in heart     Social History     Tobacco Use    Smoking status: Never    Smokeless tobacco: Never   Substance  Use Topics    Alcohol use: No    Drug use: No     Family History   Adopted: Yes   Problem Relation Name Age of Onset    Anemia Mother      Fainting Mother      Hypertension Father      Arrhythmia Father      Congenital heart disease Daughter          pda    No Known Problems Maternal Grandmother      No Known Problems Maternal Grandfather      No Known Problems Paternal Grandmother      No Known Problems Paternal Grandfather      No Known Problems Maternal Aunt      No Known Problems Maternal Uncle      No Known Problems Paternal Aunt      No Known Problems Paternal Uncle      Asthma Neg Hx      Clotting disorder Neg Hx      Heart attack Neg Hx      Heart disease Neg Hx      Heart failure Neg Hx      Hyperlipidemia Neg Hx      Stroke Neg Hx      Atrial Septal Defect Neg Hx      Pacemaker/defibrilator Neg Hx      Early death Neg Hx       Review of patient's allergies indicates:   Allergen Reactions    Codeine Nausea And Vomiting    Vicodin [hydrocodone-acetaminophen] Nausea And Vomiting       Review of Systems   Review of Systems   Constitutional:  Positive for chills and fatigue. Negative for activity change, appetite change, diaphoresis, fever and unexpected weight change.   HENT:  Negative for congestion, dental problem, ear discharge, ear pain, facial swelling, mouth sores, nosebleeds, postnasal drip, rhinorrhea, sinus pressure, sneezing, sore throat, tinnitus, trouble swallowing and voice change.    Eyes:  Negative for photophobia, pain, discharge, redness and itching.   Respiratory:  Positive for cough. Negative for apnea, chest tightness, shortness of breath and wheezing.    Cardiovascular:  Positive for leg swelling. Negative for chest pain and palpitations.   Gastrointestinal:  Positive for abdominal pain. Negative for abdominal distention, constipation, diarrhea, nausea and vomiting.   Endocrine: Negative for cold intolerance, heat intolerance, polydipsia and polyuria.   Genitourinary:  Negative for  "decreased urine volume, difficulty urinating, dysuria, flank pain, frequency, hematuria and urgency.   Musculoskeletal:  Positive for arthralgias, back pain, joint swelling, neck pain and neck stiffness. Negative for myalgias.   Skin:  Negative for pallor, rash and wound.   Allergic/Immunologic: Negative for immunocompromised state.   Neurological:  Negative for dizziness, tremors, numbness and headaches.   Hematological:  Negative for adenopathy. Does not bruise/bleed easily.   Psychiatric/Behavioral:  Negative for sleep disturbance. The patient is not nervous/anxious.         Current Medications:  Current Outpatient Medications   Medication Instructions    ALPRAZolam (XANAX) 0.25 mg, Oral, Daily PRN    BENLYSTA 200 mg, Subcutaneous, Every 7 days    clarithromycin (BIAXIN) 500 mg, Oral, Every 12 hours    fluconazole (DIFLUCAN) 150 mg, Oral, Daily    FLUoxetine 40 mg, Oral, Daily    ibuprofen-famotidine (DUEXIS) 800-26.6 mg Tab 1 tablet, Oral, 3 times daily    prednisoLONE acetate (PRED FORTE) 1 % DrpS 1 drop, 4 times daily    predniSONE (DELTASONE) 2.5 mg, Oral, Daily PRN    trypsin/balsam peru/castor oil (OPTASE TOP) 1 drop, Daily    WEGOVY 1 mg, Subcutaneous, Every 7 days         Objective:     Vitals:    01/15/25 0838   BP: 104/71   Pulse: 90   Weight: 64.7 kg (142 lb 10.2 oz)   Height: 5' 2.01" (1.575 m)   PainSc:   3      Body mass index is 26.08 kg/m².     Physical Examinations:  Physical Exam   Constitutional: She is oriented to person, place, and time.   HENT:   Head: Normocephalic and atraumatic.   Mouth/Throat: Oropharynx is clear and moist.   Eyes: Pupils are equal, round, and reactive to light.   Neck: No thyromegaly present.   Cardiovascular: Normal rate, regular rhythm and normal heart sounds. Exam reveals no gallop and no friction rub.   No murmur heard.  Pulmonary/Chest: Effort normal and breath sounds normal. She has no wheezes. She has no rales. She exhibits no tenderness.   Abdominal: There is no " abdominal tenderness. There is no rebound and no guarding.   Musculoskeletal:         General: Tenderness and deformity present.      Right shoulder: Normal.      Left shoulder: Normal.      Right elbow: Normal.      Left elbow: Normal.      Right wrist: Normal.      Left wrist: Normal.      Cervical back: Normal range of motion and neck supple.      Right knee: Normal. No effusion.      Left knee: Normal. No effusion.      Comments: Mild oa   Lymphadenopathy:     She has no cervical adenopathy.   Neurological: She is alert and oriented to person, place, and time. Gait normal.   Skin: No rash noted. No erythema. No pallor.   Psychiatric: Mood and affect normal.   Vitals reviewed.      Right Side Rheumatological Exam     Examination finds the shoulder, elbow, wrist, knee, 1st PIP, 1st MCP, 2nd PIP, 2nd MCP, 3rd PIP, 3rd MCP, 4th PIP, 4th MCP, 5th PIP and 5th MCP normal.    Shoulder Exam   Tenderness Location: no tenderness    Range of Motion   Active abduction:  abnormal   Adduction: abnormal  Sensation: normal    Knee Exam   Patellofemoral Crepitus: negative  Effusion: negative  Sensation: normal    Hip Exam   Tenderness Location: no tenderness  Sensation: normal    Elbow/Wrist Exam   Tenderness Location: no tenderness  Sensation: normal    Left Side Rheumatological Exam     Examination finds the shoulder, elbow, wrist, knee, 1st PIP, 1st MCP, 2nd PIP, 2nd MCP, 3rd PIP, 3rd MCP, 4th PIP, 4th MCP, 5th PIP and 5th MCP normal.    Shoulder Exam   Tenderness Location: no tenderness    Range of Motion   Active abduction:  abnormal   Sensation: normal    Knee Exam     Patellofemoral Crepitus: negative  Effusion: negative  Sensation: normal    Hip Exam   Tenderness Location: no tenderness  Sensation: normal    Elbow/Wrist Exam   Sensation: normal      Back/Neck Exam   General Inspection   Gait: normal            Disease Assessment Scores:  Patient's Global Assessment of arthritis (0-10): 2  Physician's Global Assessment of  "arthritis (0-10): 3  Number of Tender Joints (0-28): 3  Number of Swollen Joints (0-28): 7        5/15/2024     8:24 AM   Rapid3 Question Responses and Scores   MDHAQ Score 0.5   Psychologic Score 3.3   Pain Score 2.5   When you awakened in the morning OVER THE LAST WEEK, did you feel stiff? Yes   If Yes, please indicate the number of hours until you are as limber as you will be for the day 1   Fatigue Score 5   Global Health Score 2   RAPID3 Score 2.06       Monitoring Lab Results:  Lab Results   Component Value Date    WBC 5.15 04/04/2024    RBC 4.24 04/04/2024    HGB 12.2 04/04/2024    HCT 38.4 04/04/2024    MCV 91 04/04/2024    MCH 28.8 04/04/2024    MCHC 31.8 (L) 04/04/2024    RDW 12.4 04/04/2024     04/04/2024        Lab Results   Component Value Date     04/04/2024    K 4.6 04/04/2024     04/04/2024    CO2 27 04/04/2024    GLU 89 04/04/2024    BUN 17 04/04/2024    CREATININE 0.8 04/04/2024    CALCIUM 9.0 04/04/2024    PROT 6.5 04/04/2024    ALBUMIN 3.7 04/04/2024    BILITOT 0.3 04/04/2024    ALKPHOS 77 04/04/2024    AST 13 04/04/2024    ALT 10 04/04/2024    ANIONGAP 5 (L) 04/04/2024    EGFRNORACEVR >60.0 04/04/2024       Lab Results   Component Value Date    SEDRATE 5 11/29/2021    CRP 0.7 11/29/2021        Lab Results   Component Value Date    VNRDZVIF80HT 29 (L) 11/29/2021    FAMKIVVX64 464 07/24/2018        Lab Results   Component Value Date    CHOL 161 04/04/2024    HDL 51 04/04/2024    LDLCALC 96.8 04/04/2024    TRIG 66 04/04/2024       Lab Results   Component Value Date    RF <10.0 02/15/2017    CCPANTIBODIE 1.3 02/15/2017     Lab Results   Component Value Date    ANASCREEN Positive (A) 05/20/2024    ANATITER 1:160 05/20/2024    DSDNA Negative 1:10 05/20/2024     No results found for: "HLABB27"    Infectious Disease Screening:  Lab Results   Component Value Date    HEPBSAG Non-reactive 06/24/2024    HEPBCAB Non-reactive 06/24/2024    HEPBSURFABQU POSITIVE 06/24/2024    HEPBSURFABQU " 197 06/24/2024     Lab Results   Component Value Date    HEPCAB Non-reactive 06/24/2024     Lab Results   Component Value Date    TBGOLDPLUS Negative 06/24/2024    QUANTTBGDPL Negative 04/20/2021     Lab Results   Component Value Date    QUANTNILVALU 0.00 04/20/2021    QUANTMITOGEN >10.00 04/20/2021        Imaging:  DEXA, Xrays, MRIs, CTs, etc    Old & Outside Medical Records:  Reviewed old and all outside medical records available in Care Everywhere     Assessment:     Encounter Diagnoses   Name Primary?    Bronchitis Yes    Systemic lupus erythematosus, unspecified SLE type, unspecified organ involvement status     BMI 31.0-31.9,adult     Immunocompromised     Chronic pain of left knee     Baker cyst, left     Leg pain, diffuse, left     Reactive arthritis of knee, unspecified laterality     BISI positive        Plan:      Encounter Diagnoses   Name Primary?    Bronchitis Yes    Systemic lupus erythematosus, unspecified SLE type, unspecified organ involvement status     BMI 31.0-31.9,adult     Immunocompromised     Chronic pain of left knee     Baker cyst, left     Leg pain, diffuse, left     Reactive arthritis of knee, unspecified laterality     BISI positive    Bronchitis  -     cefTRIAXone injection 1 g  -     dexAMETHasone injection 8 mg  -     clarithromycin (BIAXIN) 500 MG tablet; Take 1 tablet (500 mg total) by mouth every 12 (twelve) hours. for 10 days  Dispense: 20 tablet; Refill: 0  -     fluconazole (DIFLUCAN) 150 MG Tab; Take 1 tablet (150 mg total) by mouth once daily. for 1 day  Dispense: 1 tablet; Refill: 0  -     Sedimentation rate; Future; Expected date: 01/15/2025  -     C-Reactive Protein; Future; Expected date: 01/15/2025  -     Comprehensive Metabolic Panel; Future; Expected date: 01/15/2025  -     CBC Auto Differential; Future; Expected date: 01/15/2025  -     X-Ray Chest PA And Lateral; Future; Expected date: 01/15/2025    Systemic lupus erythematosus, unspecified SLE type, unspecified organ  involvement status  -     Sedimentation rate; Future; Expected date: 01/15/2025  -     C-Reactive Protein; Future; Expected date: 01/15/2025  -     Comprehensive Metabolic Panel; Future; Expected date: 01/15/2025  -     CBC Auto Differential; Future; Expected date: 01/15/2025  -     predniSONE (DELTASONE) 2.5 MG tablet; Take 1 tablet (2.5 mg total) by mouth daily as needed.  Dispense: 30 tablet; Refill: 6    BMI 31.0-31.9,adult  -     ibuprofen-famotidine (DUEXIS) 800-26.6 mg Tab; Take 1 tablet by mouth 3 (three) times daily.  Dispense: 90 tablet; Refill: 12    Immunocompromised  -     Sedimentation rate; Future; Expected date: 01/15/2025  -     C-Reactive Protein; Future; Expected date: 01/15/2025  -     Comprehensive Metabolic Panel; Future; Expected date: 01/15/2025  -     CBC Auto Differential; Future; Expected date: 01/15/2025  -     ketorolac injection 60 mg    Chronic pain of left knee  -     Sedimentation rate; Future; Expected date: 01/15/2025  -     C-Reactive Protein; Future; Expected date: 01/15/2025  -     Comprehensive Metabolic Panel; Future; Expected date: 01/15/2025  -     CBC Auto Differential; Future; Expected date: 01/15/2025  -     ketorolac injection 60 mg  -     ibuprofen-famotidine (DUEXIS) 800-26.6 mg Tab; Take 1 tablet by mouth 3 (three) times daily.  Dispense: 90 tablet; Refill: 12    Baker cyst, left  -     ibuprofen-famotidine (DUEXIS) 800-26.6 mg Tab; Take 1 tablet by mouth 3 (three) times daily.  Dispense: 90 tablet; Refill: 12    Leg pain, diffuse, left  -     ibuprofen-famotidine (DUEXIS) 800-26.6 mg Tab; Take 1 tablet by mouth 3 (three) times daily.  Dispense: 90 tablet; Refill: 12    Reactive arthritis of knee, unspecified laterality  -     ibuprofen-famotidine (DUEXIS) 800-26.6 mg Tab; Take 1 tablet by mouth 3 (three) times daily.  Dispense: 90 tablet; Refill: 12    BISI positive  -     predniSONE (DELTASONE) 2.5 MG tablet; Take 1 tablet (2.5 mg total) by mouth daily as needed.   Dispense: 30 tablet; Refill: 6    Other orders  -     belimumab (BENLYSTA) 647 mg in 0.9% NaCl 250 mL IVPB  -     belimumab (BENLYSTA) 647 mg in 0.9% NaCl 250 mL IVPB  -     acetaminophen tablet 650 mg  -     diphenhydrAMINE injection 25 mg  -     methylPREDNISolone sodium succinate injection 100 mg  -     ondansetron injection 4 mg  -     ketorolac injection 30 mg  -     sodium chloride 0.9% flush 10 mL  -     0.9% NaCl 100 mL flush bag  -     heparin, porcine (PF) 100 unit/mL injection flush 500 Units  -     EPINEPHrine (EPIPEN) 0.3 mg/0.3 mL pen injection 0.3 mg  -     diphenhydrAMINE injection 50 mg  -     hydrocortisone sodium succinate injection 100 mg         1. Start iv benlysta asap  2.  Labs ordered  3. Nurse injections     Follow-up 4 months w/ Dr Almendarez    More than 50% of the  40 minute encounter was spent face to face counseling the patient regarding current status and future plan of care as well as side effects  of the medications. All questions were answered to patient's satisfaction also includes  non-face to face time preparing to see the patient (eg, review of tests), Obtaining and/or reviewing separately obtained history, Documenting clinical information in the electronic or other health record, Independently interpreting results

## 2025-01-15 NOTE — TELEPHONE ENCOUNTER
Unable to retrieve patient chart and identify care due.  [facility] Embedded Care Due Messages. Reference number: [id number].   1/15/2025 8:29:12 AM CST

## 2025-02-10 ENCOUNTER — TELEPHONE (OUTPATIENT)
Dept: RHEUMATOLOGY | Facility: CLINIC | Age: 35
End: 2025-02-10
Payer: COMMERCIAL

## 2025-02-10 NOTE — TELEPHONE ENCOUNTER
Patient may be able to try the new lower site of care infusion center down the street. Sent patient [facility] message that insurance will allow 3 visits then she has to go to a lower site of care

## 2025-02-10 NOTE — TELEPHONE ENCOUNTER
Laine David, Melanie Garnett, PharmD         Previous Messages       ----- Message -----  From: Shama Jaramillo LPN  Sent: 2025  11:08 AM CST  To: [facility]asc[facility] Staff; Medication Access Center  Subject: [facility]- FE[facility]- [facility]N LAZAR- [place]            Good Morning,    We received an approval, however it is only for a short time frame. Once the referral was , they are wanting the patient to go to a lower level of cost facility.    Patient and Provider ID  Patient name:   Ramón Lazar  Patient MRN:   [id number]  Provider Name or NPI:  [id number]    Patient Medication Info  Med Name:   BENLYSTA  Med strength:   10 MG/KG  Administration Route:   Intravenous  Administration Frequency:   WEEKS 0, 2, 4, THEN EVERY 8 WEEKS  J-Code:     Indication:   M32.9 (ICD-10-CM) - Systemic lupus erythematosus, unspecified SLE type, unspecified organ involvement status  Diagnosis Code:   M32.9 (ICD-10-CM) - Systemic lupus erythematosus, unspecified SLE type, unspecified organ involvement status  Additional Med Info:   N/A    Referral Info  Status open  Denial Reason Site of Care      Situation Explanation and Background Information  Current Situation  Patient's insurance approved a matthieu period. Then requires the patient to go to a lower level of cost facility.    What resolution avenues have been tried?  N/A- routed to [facility]    Reasons for Medication Access Center Referral  Prior Auth Submitted:   Yes  Prior Auth Denied:   Yes  Donut Hole:   N/A  [place] Out of Pocket Cost:   N/A  Medical Benefit Only:   N/A  Pharmacy Benefit Only:   N/A    What actions are needed to resolve the situation?  Please include names, phone numbers, and/or e-mail addresses.  Please make sure to respond to all correspondences in this message when providing updates.    Thanks,  Shama NOGUEIRA LPN Sr. [employer] Precertification Nurse  [facility]  Phone: [phone] EXT: [phone]  Fax:  [phone]  Teams: Shama Jaramillo

## 2025-02-18 RX ORDER — DIPHENHYDRAMINE HYDROCHLORIDE 50 MG/ML
50 INJECTION INTRAMUSCULAR; INTRAVENOUS ONCE AS NEEDED
OUTPATIENT
Start: 2025-02-18

## 2025-02-18 RX ORDER — DIPHENHYDRAMINE HYDROCHLORIDE 50 MG/ML
25 INJECTION INTRAMUSCULAR; INTRAVENOUS
OUTPATIENT
Start: 2025-02-18

## 2025-02-18 RX ORDER — HEPARIN 100 UNIT/ML
500 SYRINGE INTRAVENOUS
OUTPATIENT
Start: 2025-02-18

## 2025-02-18 RX ORDER — METHYLPREDNISOLONE SOD SUCC 125 MG
100 VIAL (EA) INJECTION
OUTPATIENT
Start: 2025-02-18

## 2025-02-18 RX ORDER — KETOROLAC TROMETHAMINE 30 MG/ML
30 INJECTION, SOLUTION INTRAMUSCULAR; INTRAVENOUS
OUTPATIENT
Start: 2025-02-18

## 2025-02-18 RX ORDER — ONDANSETRON HYDROCHLORIDE 2 MG/ML
4 INJECTION, SOLUTION INTRAVENOUS
OUTPATIENT
Start: 2025-02-18

## 2025-02-18 RX ORDER — SODIUM CHLORIDE 0.9 % (FLUSH) 0.9 %
10 SYRINGE (ML) INJECTION
OUTPATIENT
Start: 2025-02-18

## 2025-02-18 RX ORDER — EPINEPHRINE 0.3 MG/.3ML
0.3 INJECTION SUBCUTANEOUS ONCE AS NEEDED
OUTPATIENT
Start: 2025-02-18

## 2025-02-18 RX ORDER — ACETAMINOPHEN 325 MG/1
650 TABLET ORAL
OUTPATIENT
Start: 2025-02-18

## 2025-03-13 DIAGNOSIS — M71.22 BAKER CYST, LEFT: ICD-10-CM

## 2025-03-13 DIAGNOSIS — M02.369: ICD-10-CM

## 2025-03-13 DIAGNOSIS — G89.29 CHRONIC PAIN OF LEFT KNEE: ICD-10-CM

## 2025-03-13 DIAGNOSIS — F41.9 ANXIETY: ICD-10-CM

## 2025-03-13 DIAGNOSIS — M25.562 CHRONIC PAIN OF LEFT KNEE: ICD-10-CM

## 2025-03-13 DIAGNOSIS — M79.605 LEG PAIN, DIFFUSE, LEFT: ICD-10-CM

## 2025-03-13 RX ORDER — IBUPROFEN AND FAMOTIDINE 26.6; 8 MG/1; MG/1
1 TABLET ORAL 3 TIMES DAILY
Qty: 90 TABLET | Refills: 12 | Status: SHIPPED | OUTPATIENT
Start: 2025-03-13 | End: 2026-03-13

## 2025-03-13 RX ORDER — ALPRAZOLAM 0.25 MG/1
0.25 TABLET ORAL DAILY PRN
Qty: 30 TABLET | Refills: 0 | Status: SHIPPED | OUTPATIENT
Start: 2025-03-13

## 2025-03-13 NOTE — TELEPHONE ENCOUNTER
No care due was identified.  [facility] Embedded Care Due Messages. Reference number: [id number].   3/13/2025 11:46:41 AM CDT

## 2025-03-31 ENCOUNTER — PATIENT MESSAGE (OUTPATIENT)
Dept: FAMILY MEDICINE | Facility: CLINIC | Age: 35
End: 2025-03-31
Payer: COMMERCIAL

## 2025-03-31 ENCOUNTER — TELEPHONE (OUTPATIENT)
Dept: FAMILY MEDICINE | Facility: CLINIC | Age: 35
End: 2025-03-31

## 2025-03-31 ENCOUNTER — OFFICE VISIT (OUTPATIENT)
Dept: FAMILY MEDICINE | Facility: CLINIC | Age: 35
End: 2025-03-31
Payer: COMMERCIAL

## 2025-03-31 DIAGNOSIS — F32.0 CURRENT MILD EPISODE OF MAJOR DEPRESSIVE DISORDER WITHOUT PRIOR EPISODE: Primary | ICD-10-CM

## 2025-03-31 DIAGNOSIS — F41.9 ANXIETY: ICD-10-CM

## 2025-03-31 PROCEDURE — G2211 COMPLEX E/M VISIT ADD ON: HCPCS | Mod: 95,,, | Performed by: FAMILY MEDICINE

## 2025-03-31 PROCEDURE — 98005 SYNCH AUDIO-VIDEO EST LOW 20: CPT | Mod: 95,,, | Performed by: FAMILY MEDICINE

## 2025-03-31 RX ORDER — BUPROPION HYDROCHLORIDE 150 MG/1
150 TABLET ORAL DAILY
Qty: 90 TABLET | Refills: 1 | Status: SHIPPED | OUTPATIENT
Start: 2025-03-31 | End: 2026-03-31

## 2025-03-31 NOTE — PROGRESS NOTES
Subjective:       Patient ID: Jen Lazar is a 35 y.o. female.    Chief Complaint: Depression    Virtual for f/u sle and chronic issues.  Depression after step son passed in October.        Review of Systems   Constitutional:  Negative for activity change and unexpected weight change.   HENT:  Negative for hearing loss, rhinorrhea and trouble swallowing.    Eyes:  Negative for discharge and visual disturbance.   Respiratory:  Negative for chest tightness and wheezing.    Cardiovascular:  Negative for chest pain and palpitations.   Gastrointestinal:  Negative for blood in stool, constipation, diarrhea and vomiting.   Endocrine: Negative for polydipsia and polyuria.   Genitourinary:  Negative for difficulty urinating, dysuria, hematuria and menstrual problem.   Musculoskeletal:  Negative for arthralgias, joint swelling and neck pain.   Neurological:  Negative for weakness and headaches.   Psychiatric/Behavioral:  Positive for dysphoric mood. Negative for confusion.      The patient location is: [place]  The chief complaint leading to consultation is: depression and sle    Visit type: audiovisual    Face to Face time with patient: 10 minutes  12 minutes of total time spent on the encounter, which includes face to face time and non-face to face time preparing to see the patient (eg, review of tests), Obtaining and/or reviewing separately obtained history, Documenting clinical information in the electronic or other health record, Independently interpreting results (not separately reported) and communicating results to the patient/family/caregiver, or Care coordination (not separately reported).         Each patient to whom he or she provides medical services by telemedicine is:  (1) informed of the relationship between the physician and patient and the respective role of any other health care provider with respect to management of the patient; and (2) notified that he or she may decline to receive medical services by  telemedicine and may withdraw from such care at any time.    Notes:     Objective:      Physical Exam  Constitutional:       Appearance: Normal appearance.   Neurological:      Mental Status: She is alert.   Psychiatric:         Mood and Affect: Mood normal.         Behavior: Behavior normal.         Assessment:       1. Current mild episode of major depressive disorder without prior episode    2. Anxiety        Plan:       Current mild episode of major depressive disorder without prior episode    Anxiety    Other orders  -     buPROPion (WELLBUTRIN XL) 150 MG TB24 tablet; Take 1 tablet (150 mg total) by mouth once daily.  Dispense: 90 tablet; Refill: 1        Add daily bupropion and monitor  Will monitor chronic medical issues and continue current plan of care.  Visit today included increased complexity associated with the care of the episodic problem depression addressed and managing the longitudinal care of the patient due to the serious and/or complex managed problem(s) as above.  Return precautions discussed  Follow up in about 1 month (around 4/30/2025), or if symptoms worsen or fail to improve, for Virtual Visit.

## 2025-04-09 ENCOUNTER — TELEPHONE (OUTPATIENT)
Dept: RHEUMATOLOGY | Facility: CLINIC | Age: 35
End: 2025-04-09
Payer: COMMERCIAL

## 2025-04-09 NOTE — TELEPHONE ENCOUNTER
Unfortunately, message was not routed to me so likely patient has missed doses. Updated therapy plan to external and routed to [facility] to see if they are able to service this patient    Staff,  Please let patient know the above. She likely will receive a call from [employer] soon.

## 2025-04-09 NOTE — TELEPHONE ENCOUNTER
Marielena Proctor Cassidy W, PharmD; Marielena Proctor; Rocío Castro,    I have pulled the therapy plan and will get her started.    Thank you!  Marielena

## 2025-04-10 ENCOUNTER — PATIENT MESSAGE (OUTPATIENT)
Dept: FAMILY MEDICINE | Facility: CLINIC | Age: 35
End: 2025-04-10
Payer: COMMERCIAL

## 2025-05-07 ENCOUNTER — OFFICE VISIT (OUTPATIENT)
Dept: FAMILY MEDICINE | Facility: CLINIC | Age: 35
End: 2025-05-07
Payer: COMMERCIAL

## 2025-05-07 ENCOUNTER — RESULTS FOLLOW-UP (OUTPATIENT)
Dept: FAMILY MEDICINE | Facility: CLINIC | Age: 35
End: 2025-05-07

## 2025-05-07 VITALS
DIASTOLIC BLOOD PRESSURE: 66 MMHG | WEIGHT: 126.56 LBS | TEMPERATURE: 98 F | HEART RATE: 82 BPM | HEIGHT: 62 IN | OXYGEN SATURATION: 100 % | BODY MASS INDEX: 23.29 KG/M2 | SYSTOLIC BLOOD PRESSURE: 96 MMHG

## 2025-05-07 DIAGNOSIS — N39.0 URINARY TRACT INFECTION WITHOUT HEMATURIA, SITE UNSPECIFIED: Primary | ICD-10-CM

## 2025-05-07 DIAGNOSIS — N39.0 URINARY TRACT INFECTION WITHOUT HEMATURIA, SITE UNSPECIFIED: ICD-10-CM

## 2025-05-07 DIAGNOSIS — R35.0 URINARY FREQUENCY: Primary | ICD-10-CM

## 2025-05-07 LAB
BACTERIA #/AREA URNS HPF: ABNORMAL /HPF
BILIRUB UR QL STRIP.AUTO: NEGATIVE
BILIRUBIN, UA POC OHS: NEGATIVE
BLOOD, UA POC OHS: ABNORMAL
CLARITY UR: ABNORMAL
CLARITY, UA POC OHS: CLEAR
COLOR UR AUTO: ABNORMAL
COLOR, UA POC OHS: YELLOW
GLUCOSE UR QL STRIP: NEGATIVE
GLUCOSE, UA POC OHS: NEGATIVE
HGB UR QL STRIP: ABNORMAL
HYALINE CASTS #/AREA URNS LPF: 0 /LPF (ref 0–1)
KETONES UR QL STRIP: NEGATIVE
KETONES, UA POC OHS: NEGATIVE
LEUKOCYTE ESTERASE UR QL STRIP: ABNORMAL
LEUKOCYTES, UA POC OHS: ABNORMAL
MICROSCOPIC COMMENT: ABNORMAL
NITRITE UR QL STRIP: POSITIVE
NITRITE, UA POC OHS: POSITIVE
PH UR STRIP: 6 [PH]
PH, UA POC OHS: 6
PROT UR QL STRIP: ABNORMAL
PROTEIN, UA POC OHS: 100
RBC #/AREA URNS HPF: 50 /HPF (ref 0–4)
SP GR UR STRIP: 1.02
SPECIFIC GRAVITY, UA POC OHS: >=1.03
SQUAMOUS #/AREA URNS HPF: 10 /HPF
UROBILINOGEN, UA POC OHS: 0.2
WBC #/AREA URNS HPF: >100 /HPF (ref 0–5)

## 2025-05-07 PROCEDURE — 87186 SC STD MICRODIL/AGAR DIL: CPT | Performed by: NURSE PRACTITIONER

## 2025-05-07 PROCEDURE — 99999 PR PBB SHADOW E&M-EST. PATIENT-LVL III: CPT | Mod: PBBFAC,,, | Performed by: NURSE PRACTITIONER

## 2025-05-07 PROCEDURE — 81003 URINALYSIS AUTO W/O SCOPE: CPT | Mod: PO | Performed by: NURSE PRACTITIONER

## 2025-05-07 RX ORDER — NITROFURANTOIN 25; 75 MG/1; MG/1
100 CAPSULE ORAL 2 TIMES DAILY
Qty: 14 CAPSULE | Refills: 0 | Status: SHIPPED | OUTPATIENT
Start: 2025-05-07 | End: 2025-05-14

## 2025-05-07 RX ORDER — PHENAZOPYRIDINE HYDROCHLORIDE 200 MG/1
200 TABLET, FILM COATED ORAL 3 TIMES DAILY PRN
Qty: 9 TABLET | Refills: 0 | Status: SHIPPED | OUTPATIENT
Start: 2025-05-07 | End: 2025-05-10

## 2025-05-07 NOTE — PROGRESS NOTES
Subjective:       Patient ID: Jen Lazar is a 35 y.o. female.    Chief Complaint: Urinary Tract Infection (Patient stated she feel that she's that's she has the urge to urinate more than usual and when she does urinate it feels like she is not emptying her bladder fully. )    HPI  Urinary urgency, urinary frequency, urinary hesitancy, and pelvic pain X 2 days  Past Medical History:   Diagnosis Date    Anemia     ASD (atrial septal defect)     s/p device occlusion 12/2016    Bundle branch block, right     Fibromyalgia     pt denies    Ganglion cyst     Headache(784.0)     IBS (irritable bowel syndrome)     Kidney stone     PONV (postoperative nausea and vomiting)     Sinus tachycardia     SLE (systemic lupus erythematosus related syndrome)     Syncope and collapse        Past Surgical History:   Procedure Laterality Date    ASD REPAIR  12/2016    BREAST SURGERY      breast augmentation    GANGLION CYST EXCISION Left     KNEE ARTHROSCOPY Right     occluder      septal occluder in heart       Review of patient's allergies indicates:   Allergen Reactions    Codeine Nausea And Vomiting    Vicodin [hydrocodone-acetaminophen] Nausea And Vomiting       Social History[1]    Medications Ordered Prior to Encounter[2]    Family History   Adopted: Yes   Problem Relation Name Age of Onset    Anemia Mother      Fainting Mother      Hypertension Father      Arrhythmia Father      Congenital heart disease Daughter          pda    No Known Problems Maternal Grandmother      No Known Problems Maternal Grandfather      No Known Problems Paternal Grandmother      No Known Problems Paternal Grandfather      No Known Problems Maternal Aunt      No Known Problems Maternal Uncle      No Known Problems Paternal Aunt      No Known Problems Paternal Uncle      Asthma Neg Hx      Clotting disorder Neg Hx      Heart attack Neg Hx      Heart disease Neg Hx      Heart failure Neg Hx      Hyperlipidemia Neg Hx      Stroke Neg Hx       "Atrial Septal Defect Neg Hx      Pacemaker/defibrilator Neg Hx      Early death Neg Hx         Review of Systems   Genitourinary:  Positive for dysuria, frequency and urgency.       Objective:      BP 96/66 (Patient Position: Sitting)   Pulse 82   Temp 98.3 °F (36.8 °C) (Oral)   Ht 5' 2" (1.575 m)   Wt 57.4 kg (126 lb 8.7 oz)   LMP 04/26/2025   SpO2 100%   BMI 23.15 kg/m²   Physical Exam  Vitals and nursing note reviewed.   Constitutional:       General: She is not in acute distress.     Appearance: Normal appearance. She is well-groomed.   HENT:      Head: Normocephalic.      Right Ear: External ear normal.      Left Ear: External ear normal.      Nose: Nose normal.      Mouth/Throat:      Lips: Pink.      Mouth: Mucous membranes are moist.      Pharynx: Oropharynx is clear.   Eyes:      Extraocular Movements: Extraocular movements intact.      Conjunctiva/sclera: Conjunctivae normal.      Pupils: Pupils are equal, round, and reactive to light.   Cardiovascular:      Rate and Rhythm: Normal rate and regular rhythm.      Heart sounds: Normal heart sounds. No murmur heard.  Pulmonary:      Effort: Pulmonary effort is normal.      Breath sounds: Normal breath sounds.   Chest:      Chest wall: No tenderness.   Abdominal:      General: Bowel sounds are normal.      Palpations: Abdomen is soft.      Tenderness: There is abdominal tenderness (suprapubic). There is no right CVA tenderness or left CVA tenderness.   Musculoskeletal:         General: No swelling or tenderness. Normal range of motion.      Cervical back: Normal range of motion and neck supple.      Right lower leg: No edema.      Left lower leg: No edema.   Lymphadenopathy:      Cervical: No cervical adenopathy.   Skin:     General: Skin is warm and dry.   Neurological:      General: No focal deficit present.      Mental Status: She is alert and oriented to person, place, and time. Mental status is at baseline.      Gait: Gait is intact.   Psychiatric:  "        Mood and Affect: Mood normal.         Behavior: Behavior normal.         Assessment:       1. Urinary frequency    2. Urinary tract infection without hematuria, site unspecified        Plan:       Urinary frequency  -     POCT Urinalysis(Instrument)  -     phenazopyridine (PYRIDIUM) 200 MG tablet; Take 1 tablet (200 mg total) by mouth 3 (three) times daily as needed for Pain.  Dispense: 9 tablet; Refill: 0    Urinary tract infection without hematuria, site unspecified  -     nitrofurantoin, macrocrystal-monohydrate, (MACROBID) 100 MG capsule; Take 1 capsule (100 mg total) by mouth 2 (two) times daily. for 7 days  Dispense: 14 capsule; Refill: 0  -     Urinalysis, Reflex to Urine Culture Urine, Clean Catch; Future; Expected date: 05/21/2025        Macrobid X 7 days. Pyridium tid prn urinary pain. Increase water intake. Repeat UA in 2 wks.         [1]   Social History  Socioeconomic History    Marital status:     Number of children: 1   Occupational History    Occupation: Housewife   Tobacco Use    Smoking status: Never    Smokeless tobacco: Never   Substance and Sexual Activity    Alcohol use: No    Drug use: No    Sexual activity: Yes     Partners: Male     Social Drivers of Health     Financial Resource Strain: Low Risk  (3/31/2025)    Overall Financial Resource Strain (CARDIA)     Difficulty of Paying Living Expenses: Not very hard   Food Insecurity: No Food Insecurity (3/31/2025)    Hunger Vital Sign     Worried About Running Out of Food in the Last Year: Never true     Ran Out of Food in the Last Year: Never true   Transportation Needs: No Transportation Needs (3/31/2025)    PRAPARE - Transportation     Lack of Transportation (Medical): No     Lack of Transportation (Non-Medical): No   Physical Activity: Insufficiently Active (3/31/2025)    Exercise Vital Sign     Days of Exercise per Week: 2 days     Minutes of Exercise per Session: 10 min   Stress: Stress Concern Present (3/31/2025)    Citizen of [place]  [place] [employer] - Occupational Stress Questionnaire     Feeling of Stress : Very much   Housing Stability: Low Risk  (3/31/2025)    Housing Stability Vital Sign     Unable to Pay for Housing in the Last Year: No     Number of Times Moved in the Last Year: 0     Homeless in the Last Year: No   [2]   Current Outpatient Medications on File Prior to Visit   Medication Sig Dispense Refill    ALPRAZolam (XANAX) 0.25 MG tablet Take 1 tablet (0.25 mg total) by mouth daily as needed for Anxiety. 30 tablet 0    belimumab (BENLYSTA) 200 mg/mL AtIn Inject 1 mL (200 mg total) into the skin every 7 days. 4 mL 11    FLUoxetine 40 MG capsule Take 1 capsule (40 mg total) by mouth once daily. 90 capsule 1    ibuprofen-famotidine (DUEXIS) 800-26.6 mg Tab Take 1 tablet by mouth 3 (three) times daily. 90 tablet 12    prednisoLONE acetate (PRED FORTE) 1 % DrpS Place 1 drop into the left eye 4 (four) times daily.      predniSONE (DELTASONE) 2.5 MG tablet Take 1 tablet (2.5 mg total) by mouth daily as needed. 30 tablet 6    semaglutide, weight loss, (WEGOVY) 1 mg/0.5 mL PnIj Inject 1 mg into the skin every 7 days. 2 mL 5    trypsin/balsam peru/castor oil (OPTASE TOP) Place 1 drop into both eyes once daily.      [DISCONTINUED] buPROPion (WELLBUTRIN XL) 150 MG TB24 tablet Take 1 tablet (150 mg total) by mouth once daily. (Patient not taking: Reported on 5/7/2025) 90 tablet 1     No current facility-administered medications on file prior to visit.

## 2025-05-10 LAB — BACTERIA UR CULT: ABNORMAL

## 2025-05-15 ENCOUNTER — DOCUMENTATION ONLY (OUTPATIENT)
Dept: RHEUMATOLOGY | Facility: CLINIC | Age: 35
End: 2025-05-15
Payer: COMMERCIAL

## 2025-06-02 DIAGNOSIS — F41.9 ANXIETY: ICD-10-CM

## 2025-06-02 RX ORDER — ALPRAZOLAM 0.25 MG/1
0.25 TABLET ORAL DAILY PRN
Qty: 30 TABLET | Refills: 0 | Status: SHIPPED | OUTPATIENT
Start: 2025-06-02

## 2025-06-03 ENCOUNTER — TELEPHONE (OUTPATIENT)
Dept: FAMILY MEDICINE | Facility: CLINIC | Age: 35
End: 2025-06-03
Payer: COMMERCIAL

## 2025-07-03 ENCOUNTER — TELEPHONE (OUTPATIENT)
Dept: RHEUMATOLOGY | Facility: CLINIC | Age: 35
End: 2025-07-03
Payer: COMMERCIAL

## 2025-07-03 NOTE — TELEPHONE ENCOUNTER
Copied from CRM #[id number]. Topic: General Inquiry - Patient Advice  >> Jul 3, 2025 12:07 PM Zelda wrote:  Type:  Needs Medical Advice    Who Called: [facility]  Would the patient rather a call back or a response via [employer]? Call back   Best Call Back Number: [phone] refrence [phone]  Additional Information: asking if receive the fax that was sent over please call back

## 2025-08-04 DIAGNOSIS — F41.9 ANXIETY: ICD-10-CM

## 2025-08-04 RX ORDER — SEMAGLUTIDE 1 MG/.5ML
1 INJECTION, SOLUTION SUBCUTANEOUS
Qty: 2 ML | Refills: 5 | Status: SHIPPED | OUTPATIENT
Start: 2025-08-04 | End: 2026-01-31

## 2025-08-04 RX ORDER — ALPRAZOLAM 0.25 MG/1
0.25 TABLET ORAL DAILY PRN
Qty: 30 TABLET | Refills: 0 | Status: SHIPPED | OUTPATIENT
Start: 2025-08-04

## 2025-08-04 NOTE — TELEPHONE ENCOUNTER
No care due was identified.  [facility] Embedded Care Due Messages. Reference number: [id number].   8/04/2025 1:35:00 PM CDT

## 2025-08-19 ENCOUNTER — LAB VISIT (OUTPATIENT)
Dept: LAB | Facility: HOSPITAL | Age: 35
End: 2025-08-19
Attending: INTERNAL MEDICINE
Payer: COMMERCIAL

## 2025-08-19 DIAGNOSIS — M32.9 SYSTEMIC LUPUS ERYTHEMATOSUS, UNSPECIFIED SLE TYPE, UNSPECIFIED ORGAN INVOLVEMENT STATUS: ICD-10-CM

## 2025-08-19 DIAGNOSIS — J40 BRONCHITIS: ICD-10-CM

## 2025-08-19 DIAGNOSIS — D84.9 IMMUNOCOMPROMISED: ICD-10-CM

## 2025-08-19 DIAGNOSIS — G89.29 CHRONIC PAIN OF LEFT KNEE: ICD-10-CM

## 2025-08-19 DIAGNOSIS — M25.562 CHRONIC PAIN OF LEFT KNEE: ICD-10-CM

## 2025-08-19 LAB
ABSOLUTE EOSINOPHIL (OHS): 0.1 K/UL
ABSOLUTE MONOCYTE (OHS): 0.36 K/UL (ref 0.3–1)
ABSOLUTE NEUTROPHIL COUNT (OHS): 3.12 K/UL (ref 1.8–7.7)
ALBUMIN SERPL BCP-MCNC: 4.1 G/DL (ref 3.5–5.2)
ALP SERPL-CCNC: 55 UNIT/L (ref 40–150)
ALT SERPL W/O P-5'-P-CCNC: 26 UNIT/L (ref 0–55)
ANION GAP (OHS): 4 MMOL/L (ref 8–16)
AST SERPL-CCNC: 48 UNIT/L (ref 0–50)
BASOPHILS # BLD AUTO: 0.05 K/UL
BASOPHILS NFR BLD AUTO: 1.1 %
BILIRUB SERPL-MCNC: 0.4 MG/DL (ref 0.1–1)
BUN SERPL-MCNC: 16 MG/DL (ref 6–20)
CALCIUM SERPL-MCNC: 9.1 MG/DL (ref 8.7–10.5)
CHLORIDE SERPL-SCNC: 106 MMOL/L (ref 95–110)
CO2 SERPL-SCNC: 28 MMOL/L (ref 23–29)
CREAT SERPL-MCNC: 0.7 MG/DL (ref 0.5–1.4)
CRP SERPL-MCNC: 0.6 MG/L
ERYTHROCYTE [DISTWIDTH] IN BLOOD BY AUTOMATED COUNT: 11.9 % (ref 11.5–14.5)
ERYTHROCYTE [SEDIMENTATION RATE] IN BLOOD BY PHOTOMETRIC METHOD: 3 MM/HR
GFR SERPLBLD CREATININE-BSD FMLA CKD-EPI: >60 ML/MIN/1.73/M2
GLUCOSE SERPL-MCNC: 60 MG/DL (ref 70–110)
HCT VFR BLD AUTO: 36.7 % (ref 37–48.5)
HGB BLD-MCNC: 12.2 GM/DL (ref 12–16)
IMM GRANULOCYTES # BLD AUTO: 0.01 K/UL (ref 0–0.04)
IMM GRANULOCYTES NFR BLD AUTO: 0.2 % (ref 0–0.5)
LYMPHOCYTES # BLD AUTO: 0.89 K/UL (ref 1–4.8)
MCH RBC QN AUTO: 29.7 PG (ref 27–31)
MCHC RBC AUTO-ENTMCNC: 33.2 G/DL (ref 32–36)
MCV RBC AUTO: 89 FL (ref 82–98)
NUCLEATED RBC (/100WBC) (OHS): 0 /100 WBC
PLATELET # BLD AUTO: 256 K/UL (ref 150–450)
PMV BLD AUTO: 10.4 FL (ref 9.2–12.9)
POTASSIUM SERPL-SCNC: 4.7 MMOL/L (ref 3.5–5.1)
PROT SERPL-MCNC: 6.7 GM/DL (ref 6–8.4)
RBC # BLD AUTO: 4.11 M/UL (ref 4–5.4)
RELATIVE EOSINOPHIL (OHS): 2.2 %
RELATIVE LYMPHOCYTE (OHS): 19.6 % (ref 18–48)
RELATIVE MONOCYTE (OHS): 7.9 % (ref 4–15)
RELATIVE NEUTROPHIL (OHS): 69 % (ref 38–73)
SODIUM SERPL-SCNC: 138 MMOL/L (ref 136–145)
WBC # BLD AUTO: 4.53 K/UL (ref 3.9–12.7)

## 2025-08-19 PROCEDURE — 85025 COMPLETE CBC W/AUTO DIFF WBC: CPT

## 2025-08-19 PROCEDURE — 36415 COLL VENOUS BLD VENIPUNCTURE: CPT | Mod: PO

## 2025-08-19 PROCEDURE — 80053 COMPREHEN METABOLIC PANEL: CPT

## 2025-08-19 PROCEDURE — 85652 RBC SED RATE AUTOMATED: CPT

## 2025-08-19 PROCEDURE — 86140 C-REACTIVE PROTEIN: CPT

## 2025-08-26 ENCOUNTER — TELEPHONE (OUTPATIENT)
Dept: RHEUMATOLOGY | Facility: CLINIC | Age: 35
End: 2025-08-26
Payer: COMMERCIAL

## 2025-08-26 ENCOUNTER — OFFICE VISIT (OUTPATIENT)
Dept: RHEUMATOLOGY | Facility: CLINIC | Age: 35
End: 2025-08-26
Payer: COMMERCIAL

## 2025-08-26 VITALS
HEIGHT: 62 IN | HEART RATE: 69 BPM | WEIGHT: 124.31 LBS | SYSTOLIC BLOOD PRESSURE: 100 MMHG | DIASTOLIC BLOOD PRESSURE: 69 MMHG | BODY MASS INDEX: 22.88 KG/M2

## 2025-08-26 DIAGNOSIS — R76.8 ANA POSITIVE: ICD-10-CM

## 2025-08-26 DIAGNOSIS — M32.9 SYSTEMIC LUPUS ERYTHEMATOSUS, UNSPECIFIED SLE TYPE, UNSPECIFIED ORGAN INVOLVEMENT STATUS: ICD-10-CM

## 2025-08-26 DIAGNOSIS — J11.1 FLU: ICD-10-CM

## 2025-08-26 DIAGNOSIS — Z79.899 HIGH RISK MEDICATION USE: Primary | ICD-10-CM

## 2025-08-26 DIAGNOSIS — H20.9 IRITIS: ICD-10-CM

## 2025-08-26 DIAGNOSIS — D84.9 IMMUNOCOMPROMISED: ICD-10-CM

## 2025-08-26 DIAGNOSIS — H20.9 UVEITIS: ICD-10-CM

## 2025-08-26 PROCEDURE — 99999 PR PBB SHADOW E&M-EST. PATIENT-LVL III: CPT | Mod: PBBFAC,,, | Performed by: INTERNAL MEDICINE

## 2025-08-26 PROCEDURE — 99215 OFFICE O/P EST HI 40 MIN: CPT | Mod: S$GLB,,, | Performed by: INTERNAL MEDICINE

## 2025-08-26 RX ORDER — REPOSITORY CORTICOTROPIN 80 [USP'U]/ML
80 INJECTION SUBCUTANEOUS
Qty: 10 ML | Refills: 6 | Status: SHIPPED | OUTPATIENT
Start: 2025-08-26 | End: 2025-09-25

## 2025-08-26 RX ORDER — PREDNISONE 1 MG/1
3 TABLET ORAL DAILY PRN
Qty: 90 TABLET | Refills: 3 | Status: SHIPPED | OUTPATIENT
Start: 2025-08-26

## 2025-08-26 RX ORDER — SEMAGLUTIDE 0.5 MG/.5ML
0.5 INJECTION, SOLUTION SUBCUTANEOUS
Qty: 6 ML | Refills: 11 | Status: SHIPPED | OUTPATIENT
Start: 2025-08-26

## 2025-08-26 RX ORDER — SEMAGLUTIDE 0.25 MG/.5ML
0.25 INJECTION, SOLUTION SUBCUTANEOUS
Qty: 2 ML | Refills: 11 | Status: SHIPPED | OUTPATIENT
Start: 2025-08-26

## 2025-08-26 ASSESSMENT — ROUTINE ASSESSMENT OF PATIENT INDEX DATA (RAPID3)
MDHAQ FUNCTION SCORE: 0.8
PATIENT GLOBAL ASSESSMENT SCORE: 5
FATIGUE SCORE: 1.1
TOTAL RAPID3 SCORE: 4.22
PAIN SCORE: 5
PSYCHOLOGICAL DISTRESS SCORE: 2.2